# Patient Record
Sex: FEMALE | Race: BLACK OR AFRICAN AMERICAN | NOT HISPANIC OR LATINO | Employment: FULL TIME | URBAN - METROPOLITAN AREA
[De-identification: names, ages, dates, MRNs, and addresses within clinical notes are randomized per-mention and may not be internally consistent; named-entity substitution may affect disease eponyms.]

---

## 2017-07-31 ENCOUNTER — PATIENT MESSAGE (OUTPATIENT)
Dept: HEMATOLOGY/ONCOLOGY | Facility: CLINIC | Age: 62
End: 2017-07-31

## 2017-07-31 DIAGNOSIS — Z85.3 HISTORY OF BREAST CANCER: Primary | ICD-10-CM

## 2017-08-02 ENCOUNTER — OFFICE VISIT (OUTPATIENT)
Dept: HEMATOLOGY/ONCOLOGY | Facility: CLINIC | Age: 62
End: 2017-08-02
Payer: COMMERCIAL

## 2017-08-02 VITALS
RESPIRATION RATE: 20 BRPM | TEMPERATURE: 99 F | BODY MASS INDEX: 28.87 KG/M2 | SYSTOLIC BLOOD PRESSURE: 135 MMHG | HEART RATE: 81 BPM | OXYGEN SATURATION: 95 % | WEIGHT: 152.75 LBS | DIASTOLIC BLOOD PRESSURE: 79 MMHG

## 2017-08-02 DIAGNOSIS — Z85.3 HISTORY OF BREAST CANCER: Primary | ICD-10-CM

## 2017-08-02 PROCEDURE — 99999 PR PBB SHADOW E&M-EST. PATIENT-LVL III: CPT | Mod: PBBFAC,,, | Performed by: PHYSICIAN ASSISTANT

## 2017-08-02 PROCEDURE — 99213 OFFICE O/P EST LOW 20 MIN: CPT | Mod: S$GLB,,, | Performed by: PHYSICIAN ASSISTANT

## 2017-08-02 RX ORDER — NIFEDIPINE 60 MG/1
TABLET, EXTENDED RELEASE ORAL
Refills: 0 | COMMUNITY
Start: 2017-07-16

## 2017-08-02 NOTE — PROGRESS NOTES
Subjective:       Patient ID: Tosin Ma is a 61 y.o. female.    Chief Complaint: Follow-up    Mrs. Ma is a 59-year-old  female with a remote history of right breast cancer   treated in 1996 with lumpectomy, followed by radiation therapy. She did not receive chemotherapy or hormonal treatment. She was last seen in clinic by me in 8/2015.  Annual mammograms have been normal.     Mrs. Ma is feeling well. She does report a knot at her right breat but she is unsure if it is new or not and palpated it recently in the shower. No associated pain or tenderness.  She had similar complaint in 8/2015 at which point US was completed and no significant findings.  No recent changes in health.  No fever, chills, nausea, vomiting, headaches, visual changes or shortness of breath.     Mammogram scheduled for 9/25/17.          Review of Systems   Constitutional: Negative.    HENT: Negative for hearing loss, mouth sores, postnasal drip and sore throat.    Eyes: Negative for visual disturbance.   Respiratory: Negative for cough, chest tightness and shortness of breath.    Cardiovascular: Negative for chest pain and leg swelling.   Gastrointestinal: Negative for abdominal pain, blood in stool, constipation, diarrhea, nausea and vomiting.   Genitourinary: Negative for dysuria and hematuria.   Musculoskeletal: Negative for arthralgias, back pain, myalgias, neck pain and neck stiffness.   Skin: Negative for pallor and rash.   Neurological: Negative for dizziness, weakness and headaches.   Hematological: Negative for adenopathy. Does not bruise/bleed easily.   Psychiatric/Behavioral: Negative for decreased concentration. The patient is not nervous/anxious.        Objective:      Physical Exam   Constitutional: She is oriented to person, place, and time. She appears well-developed and well-nourished. No distress.   HENT:   Head: Normocephalic.   Mouth/Throat: Oropharynx is clear and moist. No oropharyngeal  exudate.   Eyes: Conjunctivae are normal. No scleral icterus.   Neck: Normal range of motion. Neck supple. No thyromegaly present.   Cardiovascular: Normal rate, regular rhythm, normal heart sounds and intact distal pulses.    Pulmonary/Chest: Effort normal and breath sounds normal. No respiratory distress.   Right breast with 1 cm mobile nodularity at 10 o'clock, right at areolar border.  This is in area of prior lumpectomy and has not changed from her last physical exam two years ago. Left breast without mass, nodule or skin changes. No axillary or supraclavicular adenopathy.    Abdominal: Soft. Bowel sounds are normal. She exhibits no distension and no mass. There is no tenderness.   Musculoskeletal:   No spinal or paraspinal tenderness to palpation     Lymphadenopathy:     She has no cervical adenopathy.   Neurological: She is alert and oriented to person, place, and time. No cranial nerve deficit.   Skin: Skin is warm and dry. No rash noted. No erythema. No pallor.   Psychiatric: She has a normal mood and affect. Her behavior is normal. Judgment and thought content normal.   Vitals reviewed.      Assessment:       61 year old female with history of right breast cancer (21 years out), clinically doing well.   Plan:       Given stability of nodule/scar tissue at right breast will plan to proceed with previously scheduled annual mammogram in September. At that time she will return to clinic.  I reassured her that finding was stable over the last two years both on physical exam and imaging.  Patient amenable to plan.

## 2017-08-02 NOTE — PROGRESS NOTES
Subjective:       Patient ID: Tosin Ma is a 61 y.o. female.    Chief Complaint: Follow-up    HPI  Review of Systems    Objective:      Physical Exam    Assessment:       No diagnosis found.    Plan:       ***

## 2017-09-25 ENCOUNTER — OFFICE VISIT (OUTPATIENT)
Dept: HEMATOLOGY/ONCOLOGY | Facility: CLINIC | Age: 62
End: 2017-09-25
Payer: COMMERCIAL

## 2017-09-25 ENCOUNTER — HOSPITAL ENCOUNTER (OUTPATIENT)
Dept: RADIOLOGY | Facility: HOSPITAL | Age: 62
Discharge: HOME | End: 2017-09-25
Attending: INTERNAL MEDICINE
Payer: COMMERCIAL

## 2017-09-25 VITALS
SYSTOLIC BLOOD PRESSURE: 135 MMHG | OXYGEN SATURATION: 100 % | WEIGHT: 147.69 LBS | BODY MASS INDEX: 27.91 KG/M2 | DIASTOLIC BLOOD PRESSURE: 80 MMHG | RESPIRATION RATE: 18 BRPM | HEART RATE: 95 BPM | TEMPERATURE: 98 F

## 2017-09-25 DIAGNOSIS — Z85.3 HISTORY OF BREAST CANCER: ICD-10-CM

## 2017-09-25 DIAGNOSIS — Z85.3 HISTORY OF BREAST CANCER: Primary | ICD-10-CM

## 2017-09-25 DIAGNOSIS — R92.8 ABNORMAL MAMMOGRAM: ICD-10-CM

## 2017-09-25 PROCEDURE — 77062 BREAST TOMOSYNTHESIS BI: CPT | Mod: 26,,, | Performed by: RADIOLOGY

## 2017-09-25 PROCEDURE — 3008F BODY MASS INDEX DOCD: CPT | Mod: S$GLB,,, | Performed by: PHYSICIAN ASSISTANT

## 2017-09-25 PROCEDURE — 77066 DX MAMMO INCL CAD BI: CPT | Mod: 26,,, | Performed by: RADIOLOGY

## 2017-09-25 PROCEDURE — 77062 BREAST TOMOSYNTHESIS BI: CPT | Mod: TC

## 2017-09-25 PROCEDURE — 99999 PR PBB SHADOW E&M-EST. PATIENT-LVL III: CPT | Mod: PBBFAC,,, | Performed by: PHYSICIAN ASSISTANT

## 2017-09-25 PROCEDURE — 99213 OFFICE O/P EST LOW 20 MIN: CPT | Mod: S$GLB,,, | Performed by: PHYSICIAN ASSISTANT

## 2017-09-25 NOTE — PROGRESS NOTES
Subjective:       Patient ID: Tosin Ma is a 61 y.o. female.    Chief Complaint: No chief complaint on file.    Mrs. Ma is a 59-year-old  female with a remote history of right breast cancer   treated in 1996 with lumpectomy, followed by radiation therapy. She did not receive chemotherapy or hormonal treatment. She was last seen in clinic by me in 8/2015.  Annual mammograms have been normal.     Mrs. Ma is feeling well. No recent changes in health, feeling well.  She did have abnormal left mammogram today as outlined below.   No fever, chills, nausea, vomiting, headaches, visual changes or shortness of breath.     Bilateral mammogram from 9/25/17:      Impression:  Left  Calcifications: Left breast calcifications at the middle 3 o'clock position. Assessment: 4 - Suspicious finding. Biopsy is recommended.      Right  There is no mammographic evidence of malignancy.     BI-RADS Category:   Overall: 4 - Suspicious     Recommendation:  Biopsy is recommended for the left breast. I discussed the findings with the patient. She will be scheduled for her biopsy.      Review of Systems   Constitutional: Negative.    HENT: Negative for hearing loss, mouth sores, postnasal drip and sore throat.    Eyes: Negative for visual disturbance.   Respiratory: Negative for cough, chest tightness and shortness of breath.    Cardiovascular: Negative for chest pain and leg swelling.   Gastrointestinal: Negative for abdominal pain, blood in stool, constipation, diarrhea, nausea and vomiting.   Genitourinary: Negative for dysuria and hematuria.   Musculoskeletal: Negative for arthralgias, back pain, myalgias, neck pain and neck stiffness.   Skin: Negative for pallor and rash.   Neurological: Negative for dizziness, weakness and headaches.   Hematological: Negative for adenopathy. Does not bruise/bleed easily.   Psychiatric/Behavioral: Negative for decreased concentration. The patient is not nervous/anxious.         Objective:      Physical Exam   Constitutional: She is oriented to person, place, and time. She appears well-developed and well-nourished. No distress.   HENT:   Head: Normocephalic.   Mouth/Throat: Oropharynx is clear and moist. No oropharyngeal exudate.   Eyes: Conjunctivae are normal. No scleral icterus.   Neck: Normal range of motion. Neck supple. No thyromegaly present.   Cardiovascular: Normal rate, regular rhythm, normal heart sounds and intact distal pulses.    Pulmonary/Chest: Effort normal and breath sounds normal. No respiratory distress.   Right breast with 1 cm mobile nodularity at 10 o'clock, right at areolar border.  This is in area of prior lumpectomy and has not changed from her last physical exam two years ago. Left breast without mass, nodule or skin changes. No axillary or supraclavicular adenopathy.    Abdominal: Soft. Bowel sounds are normal. She exhibits no distension and no mass. There is no tenderness.   Musculoskeletal:   No spinal or paraspinal tenderness to palpation     Lymphadenopathy:     She has no cervical adenopathy.   Neurological: She is alert and oriented to person, place, and time. No cranial nerve deficit.   Skin: Skin is warm and dry. No rash noted. No erythema. No pallor.   Psychiatric: She has a normal mood and affect. Her behavior is normal. Judgment and thought content normal.   Vitals reviewed.      Assessment:       61 year old female with history of right breast cancer (21 years out) and abnormal mammogram.   Plan:       Patient to proceed with biopsy as scheduled.  Return to clinic in one month, sooner if warranted by biopsy results.

## 2017-09-26 ENCOUNTER — TELEPHONE (OUTPATIENT)
Dept: RADIOLOGY | Facility: HOSPITAL | Age: 62
End: 2017-09-26

## 2017-09-26 NOTE — TELEPHONE ENCOUNTER
Spoke with patient. Reviewed breast biopsy procedure and reviewed instructions for breast biopsy. Patient expressed understanding and all questions were answered. Provided patient with my phone number to call for any further concerns or questions.   Patient scheduled breast biopsy at the [facility] on 10/6/17

## 2017-10-06 ENCOUNTER — HOSPITAL ENCOUNTER (OUTPATIENT)
Dept: RADIOLOGY | Facility: HOSPITAL | Age: 62
Discharge: HOME | End: 2017-10-06
Attending: INTERNAL MEDICINE
Payer: COMMERCIAL

## 2017-10-06 DIAGNOSIS — R92.8 ABNORMAL MAMMOGRAM: Primary | ICD-10-CM

## 2017-10-06 PROCEDURE — 77065 DX MAMMO INCL CAD UNI: CPT | Mod: TC,LT

## 2017-10-06 PROCEDURE — A4648 IMPLANTABLE TISSUE MARKER: HCPCS

## 2017-10-06 PROCEDURE — 19081 BX BREAST 1ST LESION STRTCTC: CPT | Mod: LT,,, | Performed by: STUDENT IN AN ORGANIZED HEALTH CARE EDUCATION/TRAINING PROGRAM

## 2017-10-06 PROCEDURE — 88305 TISSUE EXAM BY PATHOLOGIST: CPT | Performed by: PATHOLOGY

## 2017-10-06 PROCEDURE — 88305 TISSUE EXAM BY PATHOLOGIST: CPT | Mod: 26,,, | Performed by: PATHOLOGY

## 2017-10-06 PROCEDURE — 77065 DX MAMMO INCL CAD UNI: CPT | Mod: 26,LT,, | Performed by: STUDENT IN AN ORGANIZED HEALTH CARE EDUCATION/TRAINING PROGRAM

## 2017-10-09 ENCOUNTER — TELEPHONE (OUTPATIENT)
Dept: SURGERY | Facility: CLINIC | Age: 62
End: 2017-10-09

## 2017-10-09 NOTE — TELEPHONE ENCOUNTER
Called patient with the results of her breast biopsy from Friday. Explained that it showed fibroadenomatoid changes,  no atypia/benign, all questions answered, pt advised to follow up in 6 months with repeat imaging per core biopsy protocol, advised case will be reviewed in the weekly core conference and pt will be notified if there are any changes. Patient verbalized understanding of all information

## 2017-10-26 ENCOUNTER — OFFICE VISIT (OUTPATIENT)
Dept: HEMATOLOGY/ONCOLOGY | Facility: CLINIC | Age: 62
End: 2017-10-26
Payer: COMMERCIAL

## 2017-10-26 ENCOUNTER — TELEPHONE (OUTPATIENT)
Dept: HEMATOLOGY/ONCOLOGY | Facility: CLINIC | Age: 62
End: 2017-10-26

## 2017-10-26 VITALS
OXYGEN SATURATION: 94 % | WEIGHT: 150.13 LBS | RESPIRATION RATE: 15 BRPM | DIASTOLIC BLOOD PRESSURE: 75 MMHG | TEMPERATURE: 99 F | HEIGHT: 61 IN | BODY MASS INDEX: 28.35 KG/M2 | SYSTOLIC BLOOD PRESSURE: 154 MMHG | HEART RATE: 82 BPM

## 2017-10-26 DIAGNOSIS — R92.8 ABNORMAL MAMMOGRAM: ICD-10-CM

## 2017-10-26 DIAGNOSIS — Z85.3 HISTORY OF BREAST CANCER: Primary | ICD-10-CM

## 2017-10-26 PROCEDURE — 99999 PR PBB SHADOW E&M-EST. PATIENT-LVL III: CPT | Mod: PBBFAC,,, | Performed by: PHYSICIAN ASSISTANT

## 2017-10-26 PROCEDURE — 99213 OFFICE O/P EST LOW 20 MIN: CPT | Mod: S$GLB,,, | Performed by: PHYSICIAN ASSISTANT

## 2017-10-26 NOTE — TELEPHONE ENCOUNTER
Called pt to confirm her 4 o'clock appt with Sisi Duron. Pt did not answer so I left her a voicemail.

## 2017-10-26 NOTE — PROGRESS NOTES
Subjective:       Patient ID: Tosin Ma is a 61 y.o. female.    Chief Complaint: Breast Cancer    Mrs. Ma is a 61-year-old  female with a remote history of right breast cancer   treated in 1996 with lumpectomy, followed by radiation therapy. She did not receive chemotherapy or hormonal treatment.       She underwent biopsy of left breast abnormality on 10/6/17, that pathology was benign.  She is feeling well and no post biopsy issues.   Mrs. Ma is feeling well. No recent changes in health, feeling well.           Review of Systems   Constitutional: Negative.    HENT: Negative for hearing loss, mouth sores, postnasal drip and sore throat.    Eyes: Negative for visual disturbance.   Respiratory: Negative for cough, chest tightness and shortness of breath.    Cardiovascular: Negative for chest pain and leg swelling.   Gastrointestinal: Negative for abdominal pain, blood in stool, constipation, diarrhea, nausea and vomiting.   Genitourinary: Negative for dysuria and hematuria.   Musculoskeletal: Negative for arthralgias, back pain, myalgias, neck pain and neck stiffness.   Skin: Negative for pallor and rash.   Neurological: Negative for dizziness, weakness and headaches.   Hematological: Negative for adenopathy. Does not bruise/bleed easily.   Psychiatric/Behavioral: Negative for decreased concentration. The patient is not nervous/anxious.        Objective:      Physical Exam   Constitutional: She is oriented to person, place, and time. She appears well-developed and well-nourished. No distress.   HENT:   Head: Normocephalic.   Mouth/Throat: Oropharynx is clear and moist. No oropharyngeal exudate.   Eyes: Conjunctivae are normal. No scleral icterus.   Neck: Normal range of motion. Neck supple. No thyromegaly present.   Cardiovascular: Normal rate, regular rhythm, normal heart sounds and intact distal pulses.    Pulmonary/Chest: Effort normal and breath sounds normal. No respiratory  distress.   Right breast with 1 cm mobile nodularity at 10 o'clock, right at areolar border.  This is in area of prior lumpectomy and has not changed from her last physical exam two years ago. Left breast without mass, nodule or skin changes. No axillary or supraclavicular adenopathy.    Abdominal: Soft. Bowel sounds are normal. She exhibits no distension and no mass. There is no tenderness.   Musculoskeletal:   No spinal or paraspinal tenderness to palpation     Lymphadenopathy:     She has no cervical adenopathy.   Neurological: She is alert and oriented to person, place, and time. No cranial nerve deficit.   Skin: Skin is warm and dry. No rash noted. No erythema. No pallor.   Psychiatric: She has a normal mood and affect. Her behavior is normal. Judgment and thought content normal.   Vitals reviewed.      Assessment:       1. History of breast cancer        Plan:       Return to clinic in 6 months with mammogram.

## 2018-04-25 ENCOUNTER — OFFICE VISIT (OUTPATIENT)
Dept: HEMATOLOGY/ONCOLOGY | Facility: CLINIC | Age: 63
End: 2018-04-25
Payer: COMMERCIAL

## 2018-04-25 ENCOUNTER — HOSPITAL ENCOUNTER (OUTPATIENT)
Dept: RADIOLOGY | Facility: HOSPITAL | Age: 63
Discharge: HOME | End: 2018-04-25
Attending: PHYSICIAN ASSISTANT
Payer: COMMERCIAL

## 2018-04-25 VITALS — BODY MASS INDEX: 28.32 KG/M2 | WEIGHT: 150 LBS | HEIGHT: 61 IN

## 2018-04-25 VITALS
WEIGHT: 151.44 LBS | TEMPERATURE: 98 F | RESPIRATION RATE: 16 BRPM | OXYGEN SATURATION: 97 % | DIASTOLIC BLOOD PRESSURE: 80 MMHG | HEIGHT: 61 IN | BODY MASS INDEX: 28.59 KG/M2 | HEART RATE: 71 BPM | SYSTOLIC BLOOD PRESSURE: 169 MMHG

## 2018-04-25 DIAGNOSIS — R92.8 ABNORMAL MAMMOGRAM: ICD-10-CM

## 2018-04-25 DIAGNOSIS — Z85.3 HISTORY OF BREAST CANCER: Primary | ICD-10-CM

## 2018-04-25 PROCEDURE — 77065 DX MAMMO INCL CAD UNI: CPT | Mod: TC,PO,LT

## 2018-04-25 PROCEDURE — 77061 BREAST TOMOSYNTHESIS UNI: CPT | Mod: 26,LT,, | Performed by: RADIOLOGY

## 2018-04-25 PROCEDURE — 99214 OFFICE O/P EST MOD 30 MIN: CPT | Mod: S$GLB,,, | Performed by: PHYSICIAN ASSISTANT

## 2018-04-25 PROCEDURE — 99999 PR PBB SHADOW E&M-EST. PATIENT-LVL III: CPT | Mod: PBBFAC,,, | Performed by: PHYSICIAN ASSISTANT

## 2018-04-25 PROCEDURE — 77061 BREAST TOMOSYNTHESIS UNI: CPT | Mod: TC,PO,LT

## 2018-04-25 PROCEDURE — 77065 DX MAMMO INCL CAD UNI: CPT | Mod: 26,LT,, | Performed by: RADIOLOGY

## 2018-04-25 RX ORDER — GLIPIZIDE 5 MG/1
5 TABLET, FILM COATED, EXTENDED RELEASE ORAL
COMMUNITY
Start: 2018-04-19

## 2018-04-25 NOTE — PROGRESS NOTES
Subjective:       Patient ID: Tosin Ma is a 62 y.o. female.    Chief Complaint: Follow-up    Mrs. Ma is a 62-year-old  female with a remote history of right breast cancer   treated in 1996 with lumpectomy, followed by radiation therapy. She did not receive chemotherapy or hormonal treatment.       She underwent biopsy of left breast abnormality on 10/6/17, that pathology was benign.  She is feeling well and no post biopsy issues.   Mrs. Ma is feeling well. No recent changes in health, feeling well.      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    Mammogram from today:   Impression:  There is no mammographic evidence of malignancy.  BI-RADS Category:   Left: 2 - Benign  Overall: 2 - Benign  Recommendation:   Return to annual screening mammogram schedule is recommended due in September, 2018      Review of Systems   Constitutional: Negative.    HENT: Negative for hearing loss, mouth sores, postnasal drip and sore throat.    Eyes: Negative for visual disturbance.   Respiratory: Negative for cough, chest tightness and shortness of breath.    Cardiovascular: Negative for chest pain and leg swelling.   Gastrointestinal: Negative for abdominal pain, blood in stool, constipation, diarrhea, nausea and vomiting.   Genitourinary: Negative for dysuria and hematuria.   Musculoskeletal: Negative for arthralgias, back pain, myalgias, neck pain and neck stiffness.   Skin: Negative for pallor and rash.   Neurological: Negative for dizziness, weakness and headaches.   Hematological: Negative for adenopathy. Does not bruise/bleed easily.   Psychiatric/Behavioral: Negative for decreased concentration. The patient is not nervous/anxious.        Objective:      Physical Exam   Constitutional: She is oriented to person, place, and time. She appears  well-developed and well-nourished. No distress.   Presents alone     HENT:   Head: Normocephalic.   Mouth/Throat: Oropharynx is clear and moist. No oropharyngeal exudate.   Eyes: Conjunctivae are normal. No scleral icterus.   Neck: Normal range of motion. Neck supple. No thyromegaly present.   Cardiovascular: Normal rate, regular rhythm, normal heart sounds and intact distal pulses.    Pulmonary/Chest: Effort normal and breath sounds normal. No respiratory distress.   Right breast with 1 cm mobile nodularity at 10 o'clock, right at areolar border.  This is in area of prior lumpectomy and has not changed from her last physical exam two years ago. Left breast without mass, nodule or skin changes. No axillary or supraclavicular adenopathy.    Abdominal: Soft. Bowel sounds are normal. She exhibits no distension and no mass. There is no tenderness.   Musculoskeletal:   No spinal or paraspinal tenderness to palpation     Lymphadenopathy:     She has no cervical adenopathy.   Neurological: She is alert and oriented to person, place, and time. No cranial nerve deficit.   Skin: Skin is warm and dry. No rash noted. No erythema. No pallor.   Psychiatric: She has a normal mood and affect. Her behavior is normal. Judgment and thought content normal.   Vitals reviewed.      Assessment:       1. History of breast cancer        Plan:       Return to clinic in one year with mammogram.     Distress Screening Results: Psychosocial Distress screening score of Distress Score: 0 noted and reviewed. No intervention indicated.

## 2019-02-01 ENCOUNTER — PATIENT MESSAGE (OUTPATIENT)
Dept: HEMATOLOGY/ONCOLOGY | Facility: CLINIC | Age: 64
End: 2019-02-01

## 2019-03-15 ENCOUNTER — PATIENT MESSAGE (OUTPATIENT)
Dept: HEMATOLOGY/ONCOLOGY | Facility: CLINIC | Age: 64
End: 2019-03-15

## 2019-03-25 ENCOUNTER — PATIENT MESSAGE (OUTPATIENT)
Dept: HEMATOLOGY/ONCOLOGY | Facility: CLINIC | Age: 64
End: 2019-03-25

## 2019-03-25 DIAGNOSIS — Z12.11 COLON CANCER SCREENING: Primary | ICD-10-CM

## 2019-03-27 DIAGNOSIS — Z12.11 SPECIAL SCREENING FOR MALIGNANT NEOPLASMS, COLON: Primary | ICD-10-CM

## 2019-03-27 RX ORDER — POLYETHYLENE GLYCOL 3350, SODIUM SULFATE ANHYDROUS, SODIUM BICARBONATE, SODIUM CHLORIDE, POTASSIUM CHLORIDE 236; 22.74; 6.74; 5.86; 2.97 G/4L; G/4L; G/4L; G/4L; G/4L
4 POWDER, FOR SOLUTION ORAL ONCE
Qty: 4000 ML | Refills: 0 | Status: SHIPPED | OUTPATIENT
Start: 2019-03-27 | End: 2019-03-27

## 2019-05-30 ENCOUNTER — ANESTHESIA EVENT (OUTPATIENT)
Dept: ENDOSCOPY | Facility: HOSPITAL | Age: 64
End: 2019-05-30
Payer: COMMERCIAL

## 2019-05-31 ENCOUNTER — ANESTHESIA (OUTPATIENT)
Dept: ENDOSCOPY | Facility: HOSPITAL | Age: 64
End: 2019-05-31
Payer: COMMERCIAL

## 2019-05-31 ENCOUNTER — HOSPITAL ENCOUNTER (OUTPATIENT)
Facility: HOSPITAL | Age: 64
Discharge: HOME | End: 2019-05-31
Attending: SURGERY | Admitting: SURGERY
Payer: COMMERCIAL

## 2019-05-31 VITALS
OXYGEN SATURATION: 100 % | SYSTOLIC BLOOD PRESSURE: 147 MMHG | HEART RATE: 87 BPM | DIASTOLIC BLOOD PRESSURE: 80 MMHG | RESPIRATION RATE: 14 BRPM | TEMPERATURE: 97 F | HEIGHT: 61 IN | BODY MASS INDEX: 27.75 KG/M2 | WEIGHT: 147 LBS

## 2019-05-31 DIAGNOSIS — Z12.11 ENCOUNTER FOR SCREENING COLONOSCOPY: Primary | ICD-10-CM

## 2019-05-31 LAB — POCT GLUCOSE: 107 MG/DL (ref 70–110)

## 2019-05-31 PROCEDURE — G0121 COLON CA SCRN NOT HI RSK IND: HCPCS | Mod: ,,, | Performed by: SURGERY

## 2019-05-31 PROCEDURE — E9220 PRA ENDO ANESTHESIA: ICD-10-PCS | Mod: ,,, | Performed by: NURSE ANESTHETIST, CERTIFIED REGISTERED

## 2019-05-31 PROCEDURE — 37000008 HC ANESTHESIA 1ST 15 MINUTES: Performed by: SURGERY

## 2019-05-31 PROCEDURE — 63600175 PHARM REV CODE 636 W HCPCS: Performed by: NURSE ANESTHETIST, CERTIFIED REGISTERED

## 2019-05-31 PROCEDURE — 25000003 PHARM REV CODE 250: Performed by: SURGERY

## 2019-05-31 PROCEDURE — 37000009 HC ANESTHESIA EA ADD 15 MINS: Performed by: SURGERY

## 2019-05-31 PROCEDURE — 82962 GLUCOSE BLOOD TEST: CPT | Performed by: SURGERY

## 2019-05-31 PROCEDURE — E9220 PRA ENDO ANESTHESIA: HCPCS | Mod: ,,, | Performed by: NURSE ANESTHETIST, CERTIFIED REGISTERED

## 2019-05-31 PROCEDURE — 25000003 PHARM REV CODE 250: Performed by: NURSE ANESTHETIST, CERTIFIED REGISTERED

## 2019-05-31 PROCEDURE — G0121 COLON CA SCRN NOT HI RSK IND: ICD-10-PCS | Mod: ,,, | Performed by: SURGERY

## 2019-05-31 PROCEDURE — G0121 COLON CA SCRN NOT HI RSK IND: HCPCS | Performed by: SURGERY

## 2019-05-31 RX ORDER — PROPOFOL 10 MG/ML
VIAL (ML) INTRAVENOUS CONTINUOUS PRN
Status: DISCONTINUED | OUTPATIENT
Start: 2019-05-31 | End: 2019-05-31

## 2019-05-31 RX ORDER — PROPOFOL 10 MG/ML
VIAL (ML) INTRAVENOUS
Status: DISCONTINUED | OUTPATIENT
Start: 2019-05-31 | End: 2019-05-31

## 2019-05-31 RX ORDER — GLYCOPYRROLATE 0.2 MG/ML
INJECTION INTRAMUSCULAR; INTRAVENOUS
Status: DISCONTINUED | OUTPATIENT
Start: 2019-05-31 | End: 2019-05-31

## 2019-05-31 RX ORDER — SODIUM CHLORIDE 9 MG/ML
INJECTION, SOLUTION INTRAVENOUS CONTINUOUS
Status: DISCONTINUED | OUTPATIENT
Start: 2019-05-31 | End: 2019-05-31 | Stop reason: HOSPADM

## 2019-05-31 RX ORDER — LIDOCAINE HCL/PF 100 MG/5ML
SYRINGE (ML) INTRAVENOUS
Status: DISCONTINUED | OUTPATIENT
Start: 2019-05-31 | End: 2019-05-31

## 2019-05-31 RX ADMIN — PROPOFOL 20 MG: 10 INJECTION, EMULSION INTRAVENOUS at 07:05

## 2019-05-31 RX ADMIN — SODIUM CHLORIDE: 0.9 INJECTION, SOLUTION INTRAVENOUS at 07:05

## 2019-05-31 RX ADMIN — LIDOCAINE HYDROCHLORIDE 50 MG: 20 INJECTION, SOLUTION INTRAVENOUS at 07:05

## 2019-05-31 RX ADMIN — PROPOFOL 50 MG: 10 INJECTION, EMULSION INTRAVENOUS at 07:05

## 2019-05-31 RX ADMIN — PROPOFOL 150 MCG/KG/MIN: 10 INJECTION, EMULSION INTRAVENOUS at 07:05

## 2019-05-31 RX ADMIN — GLYCOPYRROLATE 0.2 MG: 0.2 INJECTION, SOLUTION INTRAMUSCULAR; INTRAVENOUS at 07:05

## 2019-05-31 NOTE — ANESTHESIA PREPROCEDURE EVALUATION
05/31/2019  Tosin Ma is a 63 y.o., female.    Anesthesia Evaluation    I have reviewed the Patient Summary Reports.     I have reviewed the Medications.     Review of Systems  Anesthesia Hx:  No problems with previous Anesthesia    Social:  Non-Smoker, No Alcohol Use    Hematology/Oncology:         -- Cancer in past history: Breast right   Cardiovascular:   Hypertension    Pulmonary:  Pulmonary Normal    Endocrine:   Diabetes        Physical Exam  General:  Well nourished    Airway/Jaw/Neck:  Airway Findings: Mouth Opening: Normal Tongue: Normal  General Airway Assessment: Adult  Mallampati: II  Jaw/Neck Findings:  Neck ROM: Normal ROM      Dental:  Dental Findings: In tact    Chest/Lungs:  Chest/Lungs Findings: Clear to auscultation, Normal Respiratory Rate     Heart/Vascular:  Heart Findings: Rate: Normal  Rhythm: Regular Rhythm  Sounds: Normal        Mental Status:  Mental Status Findings:  Cooperative, Alert and Oriented         Anesthesia Plan  Type of Anesthesia, risks & benefits discussed:  Anesthesia Type:  general  Patient's Preference:   Intra-op Monitoring Plan: standard ASA monitors  Intra-op Monitoring Plan Comments:   Post Op Pain Control Plan: multimodal analgesia  Post Op Pain Control Plan Comments:   Induction:   IV  Beta Blocker:         Informed Consent: Patient understands risks and agrees with Anesthesia plan.  Questions answered. Anesthesia consent signed with patient.  ASA Score: 3     Day of Surgery Review of History & Physical:    H&P update referred to the surgeon.         Ready For Surgery From Anesthesia Perspective.

## 2019-05-31 NOTE — H&P
[facility]  History & Physical     Subjective:     Chief Complaint/Reason for Admission: screening colonoscopy    History of Present Illness:   Patient 63 y.o. female presents for screening colonoscopy.  She notes that it has been over 10 years since her last colonoscopy.  Pt denies any abdominal pain.  She does have occasional light bleeding with BM.  No changes in bowel habits. Pt suffers with DM and HTN.  PSHx isnotable for csection for hysterectomy    Patient Active Problem List    Diagnosis Date Noted    Encounter for screening colonoscopy 05/31/2019    History of breast cancer 08/04/2014        Medications Prior to Admission   Medication Sig Dispense Refill Last Dose    glipiZIDE (GLUCOTROL) 5 MG TR24 Take 5 mg by mouth.   Past Week at Unknown time    hydrochlorothiazide (HYDRODIURIL) 25 MG tablet   0 5/31/2019 at Unknown time    LANTUS SOLOSTAR 100 unit/mL (3 mL) InPn pen   0 Past Week at Unknown time    metformin (GLUCOPHAGE) 500 MG tablet   0 Past Week at Unknown time    nifedipine (PROCARDIA-XL) 60 MG (OSM) 24 hr tablet   0 5/31/2019 at Unknown time    pioglitazone (ACTOS) 30 MG tablet Take 30 mg by mouth once daily.  0 More than a month at Unknown time     Review of patient's allergies indicates:  No Known Allergies     Past Medical History:   Diagnosis Date    Breast cancer 1996    Right    Diabetes mellitus     Hypertension       Past Surgical History:   Procedure Laterality Date    BREAST BIOPSY Left 10/06/2017    BREAST BIOPSY Right 1996    BREAST LUMPECTOMY Right 1996    + CA      Family History   Problem Relation Age of Onset    Breast cancer Sister 48    Breast cancer Cousin         30's    Breast cancer Cousin 48    Pancreatic cancer Mother     Ovarian cancer Neg Hx       Social History     Tobacco Use    Smoking status: Never Smoker    Smokeless tobacco: Never Used   Substance Use Topics    Alcohol use: No        Review of Systems:  A comprehensive review  Updated Dr Tejada on pt's low urine output. Order received to give 500ml fluid bolus.   "of systems was negative.    OBJECTIVE:     Patient Vitals for the past 8 hrs:   BP Temp Temp src Pulse Resp SpO2 Height Weight   05/31/19 0719 136/81 97.7 °F (36.5 °C) Temporal 86 16 98 % 5' 1" (1.549 m) 66.7 kg (147 lb)     AAOx3  CTA B  Sinus  Soft/nt/nd        ASSESSMENT/PLAN:     Active Hospital Problems    Diagnosis  POA    Encounter for screening colonoscopy [Z12.11]  Not Applicable      Resolved Hospital Problems   No resolved problems to display.       Plan:  To have colonoscopy today  "

## 2019-05-31 NOTE — ANESTHESIA POSTPROCEDURE EVALUATION
Anesthesia Post Evaluation    Patient: Tosin Ma    Procedure(s) Performed: Procedure(s) (LRB):  COLONOSCOPY (N/A)    Final Anesthesia Type: general  Patient location during evaluation: GI PACU  Patient participation: Yes- Able to Participate  Level of consciousness: awake and alert  Post-procedure vital signs: reviewed and stable  Pain management: adequate  Airway patency: patent  PONV status at discharge: No PONV  Anesthetic complications: no      Cardiovascular status: blood pressure returned to baseline  Respiratory status: spontaneous ventilation  Hydration status: euvolemic  Follow-up not needed.          Vitals Value Taken Time   /80 5/31/2019  8:32 AM   Temp 36.3 °C (97.4 °F) 5/31/2019  8:02 AM   Pulse 87 5/31/2019  8:32 AM   Resp 14 5/31/2019  8:32 AM   SpO2 100 % 5/31/2019  8:32 AM         Event Time     Out of Recovery 08:36:56          Pain/Nader Score: Nader Score: 10 (5/31/2019  8:32 AM)

## 2019-05-31 NOTE — [employer] PATIENT INSTRUCTIONS
Discharge Summary/Instructions after an Endoscopic Procedure  Patient Name: Tosin Ma  Patient MRN: [id number]  Patient YOB: 1955  Friday, May 31, 2019  Fredrick Bailey MD  RESTRICTIONS:  During your procedure today, you received medications for sedation.  These   medications may affect your judgment, balance and coordination.  Therefore,   for 24 hours, you have the following restrictions:   - DO NOT drive a car, operate machinery, make legal/financial decisions,   sign important papers or drink alcohol.    ACTIVITY:  Today: no heavy lifting, straining or running due to procedural   sedation/anesthesia.  The following day: return to full activity including work.  DIET:  Eat and drink normally unless instructed otherwise.     TREATMENT FOR COMMON SIDE EFFECTS:  - Mild abdominal pain, nausea, belching, bloating or excessive gas:  rest,   eat lightly and use a heating pad.  - Sore Throat: treat with throat lozenges and/or gargle with warm salt   water.  - Because air was used during the procedure, expelling large amounts of air   from your rectum or belching is normal.  - If a bowel prep was taken, you may not have a bowel movement for 1-3 days.    This is normal.  SYMPTOMS TO WATCH FOR AND REPORT TO YOUR PHYSICIAN:  1. Abdominal pain or bloating, other than gas cramps.  2. Chest pain.  3. Back pain.  4. Signs of infection such as: chills or fever occurring within 24 hours   after the procedure.  5. Rectal bleeding, which would show as bright red, maroon, or black stools.   (A tablespoon of blood from the rectum is not serious, especially if   hemorrhoids are present.)  6. Vomiting.  7. Weakness or dizziness.  GO DIRECTLY TO THE NEAREST EMERGENCY ROOM IF YOU HAVE ANY OF THE FOLLOWING:      Difficulty breathing              Chills and/or fever over 101 F   Persistent vomiting and/or vomiting blood   Severe abdominal pain   Severe chest pain   Black, tarry stools   Bleeding- more than one  tablespoon   Any other symptom or condition that you feel may need urgent attention  Your doctor recommends these additional instructions:  If any biopsies were taken, your doctors clinic will contact you in 1 to 2   weeks with any results.  - Discharge patient to home (ambulatory).   - Resume regular diet.   - Continue present medications.   - Repeat colonoscopy in 10 years for screening purposes.   - Return to my office PRN.   - Patient has a contact number available for emergencies.  The signs and   symptoms of potential delayed complications were discussed with the   patient.  Return to normal activities tomorrow.  Written discharge   instructions were provided to the patient.  For questions, problems or results please call your physician - Fredrick Bailey MD at Work:  [phone].  [facility], EMERGENCY ROOM PHONE NUMBER: [phone]  IF A COMPLICATION OR EMERGENCY SITUATION ARISES AND YOU ARE UNABLE TO REACH   YOUR PHYSICIAN - GO DIRECTLY TO THE EMERGENCY ROOM.  Fredrick Bailey MD  5/31/2019 8:01:32 AM  This report has been verified and signed electronically.  [employer]

## 2019-05-31 NOTE — DISCHARGE INSTRUCTIONS
Colonoscopy     A camera attached to a flexible tube with a viewing lens is used to take video pictures.     Colonoscopy is a test to view the inside of your lower digestive tract (colon and rectum). Sometimes it can show the last part of the small intestine (ileum). During the test, small pieces of tissue may be removed for testing. This is called a biopsy. Small growths, such as polyps, may also be removed.   Why is colonoscopy done?  The test is done to help look for colon cancer. And it can help find the source of abdominal pain, bleeding, and changes in bowel habits. It may be needed once a year, depending on factors such as your:  · Age  · Health history  · Family health history  · Symptoms  · Results from any prior colonoscopy  Risks and possible complications  These include:  · Bleeding               · A puncture or tear in the colon   · Risks of anesthesia  · A cancer lesion not being seen  Getting ready   To prepare for the test:  · Talk with your healthcare provider about the risks of the test (see below). Also ask your healthcare provider about alternatives to the test.  · Tell your healthcare provider about any medicines you take. Also tell him or her about any health conditions you may have.  · Make sure your rectum and colon are empty for the test. Follow the diet and bowel prep instructions exactly. If you dont, the test may need to be rescheduled.  · Plan for a friend or family member to drive you home after the test.     Colonoscopy provides an inside view of the entire colon.     You may discuss the results with your doctor right away or at a future visit.  During the test   The test is usually done in the hospital on an outpatient basis. This means you go home the same day. The procedure takes about 30 minutes. During that time:  · You are given relaxing (sedating) medicine through an IV line. You may be drowsy, or fully asleep.  · The healthcare provider will first give you a physical exam to  check for anal and rectal problems.  · Then the anus is lubricated and the scope inserted.  · If you are awake, you may have a feeling similar to needing to have a bowel movement. You may also feel pressure as air is pumped into the colon. Its OK to pass gas during the procedure.  · Biopsy, polyp removal, or other treatments may be done during the test.  After the test   You may have gas right after the test. It can help to try to pass it to help prevent later bloating. Your healthcare provider may discuss the results with you right away. Or you may need to schedule a follow-up visit to talk about the results. After the test, you can go back to your normal eating and other activities. You may be tired from the sedation and need to rest for a few hours.  Date Last Reviewed: 11/1/2016 © 2000-2017 [employer]. [address] [place] [address], [place] [address]. All rights reserved. This information is not intended as a substitute for professional medical care. Always follow your healthcare professional's instructions.

## 2019-05-31 NOTE — TRANSFER OF CARE
"Anesthesia Transfer of Care Note    Patient: Tosin Ma    Procedure(s) Performed: Procedure(s) (LRB):  COLONOSCOPY (N/A)    Patient location: GI    Anesthesia Type: general    Transport from OR: Transported from OR on room air with adequate spontaneous ventilation    Post pain: adequate analgesia    Post assessment: no apparent anesthetic complications and tolerated procedure well    Post vital signs: stable    Level of consciousness: awake    Nausea/Vomiting: no nausea/vomiting    Complications: none    Transfer of care protocol was followed      Last vitals:   Visit Vitals  /60 (BP Location: Left leg, Patient Position: Lying)   Pulse 92   Temp 36.3 °C (97.4 °F) (Temporal)   Resp 14   Ht 5' 1" (1.549 m)   Wt 66.7 kg (147 lb)   SpO2 100%   Breastfeeding? No   BMI 27.78 kg/m²     "

## 2019-06-07 ENCOUNTER — TELEPHONE (OUTPATIENT)
Dept: ENDOSCOPY | Facility: HOSPITAL | Age: 64
End: 2019-06-07

## 2020-10-22 DIAGNOSIS — Z85.3 HISTORY OF BREAST CANCER: Primary | ICD-10-CM

## 2020-11-12 ENCOUNTER — HOSPITAL ENCOUNTER (OUTPATIENT)
Dept: RADIOLOGY | Facility: HOSPITAL | Age: 65
Discharge: HOME | End: 2020-11-12
Attending: PHYSICIAN ASSISTANT
Payer: COMMERCIAL

## 2020-11-12 ENCOUNTER — OFFICE VISIT (OUTPATIENT)
Dept: HEMATOLOGY/ONCOLOGY | Facility: CLINIC | Age: 65
End: 2020-11-12
Payer: COMMERCIAL

## 2020-11-12 VITALS — BODY MASS INDEX: 27.26 KG/M2 | WEIGHT: 144.38 LBS | HEIGHT: 61 IN

## 2020-11-12 VITALS
WEIGHT: 139.56 LBS | HEIGHT: 61 IN | TEMPERATURE: 98 F | DIASTOLIC BLOOD PRESSURE: 76 MMHG | HEART RATE: 74 BPM | OXYGEN SATURATION: 100 % | BODY MASS INDEX: 26.35 KG/M2 | SYSTOLIC BLOOD PRESSURE: 126 MMHG

## 2020-11-12 DIAGNOSIS — Z85.3 HISTORY OF BREAST CANCER: ICD-10-CM

## 2020-11-12 DIAGNOSIS — Z85.3 HISTORY OF BREAST CANCER: Primary | ICD-10-CM

## 2020-11-12 PROCEDURE — 77066 DX MAMMO INCL CAD BI: CPT | Mod: 26,,, | Performed by: RADIOLOGY

## 2020-11-12 PROCEDURE — 77062 MAMMO DIGITAL DIAGNOSTIC BILAT WITH TOMO: ICD-10-PCS | Mod: 26,,, | Performed by: RADIOLOGY

## 2020-11-12 PROCEDURE — 99999 PR PBB SHADOW E&M-EST. PATIENT-LVL III: CPT | Mod: PBBFAC,,, | Performed by: PHYSICIAN ASSISTANT

## 2020-11-12 PROCEDURE — 77066 DX MAMMO INCL CAD BI: CPT | Mod: TC

## 2020-11-12 PROCEDURE — 99213 PR OFFICE/OUTPT VISIT, EST, LEVL III, 20-29 MIN: ICD-10-PCS | Mod: S$GLB,,, | Performed by: PHYSICIAN ASSISTANT

## 2020-11-12 PROCEDURE — 77066 MAMMO DIGITAL DIAGNOSTIC BILAT WITH TOMO: ICD-10-PCS | Mod: 26,,, | Performed by: RADIOLOGY

## 2020-11-12 PROCEDURE — 77062 BREAST TOMOSYNTHESIS BI: CPT | Mod: 26,,, | Performed by: RADIOLOGY

## 2020-11-12 PROCEDURE — 99213 OFFICE O/P EST LOW 20 MIN: CPT | Mod: S$GLB,,, | Performed by: PHYSICIAN ASSISTANT

## 2020-11-12 PROCEDURE — 99999 PR PBB SHADOW E&M-EST. PATIENT-LVL III: ICD-10-PCS | Mod: PBBFAC,,, | Performed by: PHYSICIAN ASSISTANT

## 2020-11-12 NOTE — PROGRESS NOTES
Subjective:       Patient ID: Tosin Ma is a 65 y.o. female.    Chief Complaint: Follow-up (6 month ), Results (mammogram), and History of breast cancer    Mrs. Ma is a 65-year-old  female with a remote history of right breast cancer   treated in 1996 with lumpectomy, followed by radiation therapy. She did not receive chemotherapy or hormonal treatment.       She has some intermittent left breast pain that comes and goes.   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    She underwent biopsy of left breast abnormality on 10/6/17, that pathology was benign.  She is feeling well and no post biopsy issues.   Mrs. Ma is feeling well. No recent changes in health, feeling well.         Mammogram from today was unremarkable.     Review of Systems   Constitutional: Negative.    HENT: Negative for nasal congestion, rhinorrhea, sore throat and trouble swallowing.    Eyes: Negative for visual disturbance.   Respiratory: Negative for cough, chest tightness and shortness of breath.    Cardiovascular: Negative for chest pain, palpitations and leg swelling.   Gastrointestinal: Negative for abdominal pain, blood in stool, constipation, diarrhea, nausea and vomiting.   Genitourinary: Negative for dysuria, hematuria and vaginal bleeding.   Musculoskeletal: Negative for arthralgias, back pain and myalgias.   Integumentary:  Negative for pallor and rash.   Neurological: Negative for dizziness, weakness and headaches.   Hematological: Negative for adenopathy. Does not bruise/bleed easily.   Psychiatric/Behavioral: Negative for dysphoric mood and suicidal ideas. The patient is not nervous/anxious.          Objective:      Physical Exam  Vitals signs reviewed.   Constitutional:       General: She is not in acute distress.     Appearance: She is well-developed.      Comments: Presents alone     HENT:      Head: Normocephalic.      Mouth/Throat:      Pharynx: No oropharyngeal exudate.   Eyes:      General: No  scleral icterus.     Conjunctiva/sclera: Conjunctivae normal.   Neck:      Musculoskeletal: Normal range of motion and neck supple.      Thyroid: No thyromegaly.   Cardiovascular:      Rate and Rhythm: Normal rate and regular rhythm.      Heart sounds: Normal heart sounds.   Pulmonary:      Effort: Pulmonary effort is normal. No respiratory distress.      Breath sounds: Normal breath sounds.      Comments: Right breast with 1 cm mobile nodularity at 10 o'clock, right at areolar border.  This is in area of prior lumpectomy and has not changed from her last physical exam two years ago. Left breast without mass, nodule or skin changes. No axillary or supraclavicular adenopathy.  Abdominal:      General: Bowel sounds are normal. There is no distension.      Palpations: Abdomen is soft. There is no mass.      Tenderness: There is no abdominal tenderness.   Musculoskeletal:      Comments: No spinal or paraspinal tenderness to palpation     Lymphadenopathy:      Cervical: No cervical adenopathy.   Skin:     General: Skin is warm and dry.      Coloration: Skin is not pale.      Findings: No erythema or rash.   Neurological:      Mental Status: She is alert and oriented to person, place, and time.      Cranial Nerves: No cranial nerve deficit.   Psychiatric:         Behavior: Behavior normal.         Thought Content: Thought content normal.         Judgment: Judgment normal.         Assessment:       1. History of breast cancer        Plan:       Clinically without evidence of disease. RTC in one year with mammogram.

## 2022-01-04 ENCOUNTER — HOSPITAL ENCOUNTER (OUTPATIENT)
Dept: RADIOLOGY | Facility: HOSPITAL | Age: 67
Discharge: HOME | End: 2022-01-04
Attending: PHYSICIAN ASSISTANT
Payer: COMMERCIAL

## 2022-01-04 VITALS — WEIGHT: 142 LBS | BODY MASS INDEX: 26.81 KG/M2 | HEIGHT: 61 IN

## 2022-01-04 DIAGNOSIS — Z85.3 HISTORY OF BREAST CANCER: ICD-10-CM

## 2022-01-04 PROCEDURE — 77062 BREAST TOMOSYNTHESIS BI: CPT | Mod: 26,,, | Performed by: RADIOLOGY

## 2022-01-04 PROCEDURE — 77066 DX MAMMO INCL CAD BI: CPT | Mod: 26,,, | Performed by: RADIOLOGY

## 2022-01-04 PROCEDURE — 77062 MAMMO DIGITAL DIAGNOSTIC BILAT WITH TOMO: ICD-10-PCS | Mod: 26,,, | Performed by: RADIOLOGY

## 2022-01-04 PROCEDURE — 77066 MAMMO DIGITAL DIAGNOSTIC BILAT WITH TOMO: ICD-10-PCS | Mod: 26,,, | Performed by: RADIOLOGY

## 2022-01-04 PROCEDURE — 77062 BREAST TOMOSYNTHESIS BI: CPT | Mod: TC

## 2022-01-25 ENCOUNTER — OFFICE VISIT (OUTPATIENT)
Dept: HEMATOLOGY/ONCOLOGY | Facility: CLINIC | Age: 67
End: 2022-01-25
Payer: COMMERCIAL

## 2022-01-25 VITALS
WEIGHT: 145.94 LBS | DIASTOLIC BLOOD PRESSURE: 74 MMHG | BODY MASS INDEX: 27.55 KG/M2 | HEART RATE: 82 BPM | RESPIRATION RATE: 18 BRPM | HEIGHT: 61 IN | OXYGEN SATURATION: 98 % | SYSTOLIC BLOOD PRESSURE: 145 MMHG

## 2022-01-25 DIAGNOSIS — Z85.3 HISTORY OF BREAST CANCER: Primary | ICD-10-CM

## 2022-01-25 PROCEDURE — 99213 OFFICE O/P EST LOW 20 MIN: CPT | Mod: S$GLB,,, | Performed by: PHYSICIAN ASSISTANT

## 2022-01-25 PROCEDURE — 99213 PR OFFICE/OUTPT VISIT, EST, LEVL III, 20-29 MIN: ICD-10-PCS | Mod: S$GLB,,, | Performed by: PHYSICIAN ASSISTANT

## 2022-01-25 PROCEDURE — 99999 PR PBB SHADOW E&M-EST. PATIENT-LVL III: ICD-10-PCS | Mod: PBBFAC,,, | Performed by: PHYSICIAN ASSISTANT

## 2022-01-25 PROCEDURE — 99999 PR PBB SHADOW E&M-EST. PATIENT-LVL III: CPT | Mod: PBBFAC,,, | Performed by: PHYSICIAN ASSISTANT

## 2022-01-25 NOTE — PROGRESS NOTES
Subjective:       Patient ID: Tosin Ma is a 66 y.o. female.    Chief Complaint: History of breast cancer    Mrs. Ma is a 66- year-old  female with a remote history of right breast cancer   treated in 1996 with lumpectomy, followed by radiation therapy. She did not receive chemotherapy or hormonal treatment.       She has some intermittent left breast pain that comes and goes. Overall feeling well. No shortness of breath, headaches or back pain.   She continues to work remotely from home due to Covid.     She underwent biopsy of left breast abnormality on 10/6/17, that pathology was benign.  She is feeling well and no post biopsy issues.   Mrs. Ma is feeling well. No recent changes in health, feeling well.         Mammogram from 1/4/2022 was unremarkable.     Review of Systems   Constitutional: Negative.    HENT: Negative for nasal congestion, rhinorrhea, sore throat and trouble swallowing.    Eyes: Negative for visual disturbance.   Respiratory: Negative for cough, chest tightness and shortness of breath.    Cardiovascular: Negative for chest pain, palpitations and leg swelling.   Gastrointestinal: Negative for abdominal pain, blood in stool, constipation, diarrhea, nausea and vomiting.   Genitourinary: Negative for dysuria, hematuria and vaginal bleeding.   Musculoskeletal: Negative for arthralgias, back pain and myalgias.   Integumentary:  Negative for pallor and rash.   Neurological: Negative for dizziness, weakness and headaches.   Hematological: Negative for adenopathy. Does not bruise/bleed easily.   Psychiatric/Behavioral: Negative for dysphoric mood and suicidal ideas. The patient is not nervous/anxious.          Objective:      Physical Exam  Vitals reviewed.   Constitutional:       General: She is not in acute distress.     Appearance: She is well-developed.      Comments: Presents alone     HENT:      Head: Normocephalic.      Mouth/Throat:      Pharynx: No oropharyngeal  exudate.   Eyes:      General: No scleral icterus.     Conjunctiva/sclera: Conjunctivae normal.   Neck:      Thyroid: No thyromegaly.   Cardiovascular:      Rate and Rhythm: Normal rate and regular rhythm.      Heart sounds: Normal heart sounds.   Pulmonary:      Effort: Pulmonary effort is normal. No respiratory distress.      Breath sounds: Normal breath sounds.      Comments: Right breast with 1 cm mobile nodularity at 10 o'clock, right at areolar border.  This is in area of prior lumpectomy and has not changed from her last physical exam two years ago. Left breast without mass, nodule or skin changes. No axillary or supraclavicular adenopathy.  Abdominal:      General: Bowel sounds are normal. There is no distension.      Palpations: Abdomen is soft. There is no mass.      Tenderness: There is no abdominal tenderness.   Musculoskeletal:      Cervical back: Normal range of motion and neck supple.      Comments: No spinal or paraspinal tenderness to palpation     Lymphadenopathy:      Cervical: No cervical adenopathy.   Skin:     General: Skin is warm and dry.      Coloration: Skin is not pale.      Findings: No erythema or rash.   Neurological:      Mental Status: She is alert and oriented to person, place, and time.      Cranial Nerves: No cranial nerve deficit.   Psychiatric:         Behavior: Behavior normal.         Thought Content: Thought content normal.         Judgment: Judgment normal.         Assessment:       Problem List Items Addressed This Visit        Oncology    History of breast cancer - Primary          Plan:       Patient is clinically without evidence of disease. Return to clinic in one year with mammogram.

## 2023-03-21 ENCOUNTER — HOSPITAL ENCOUNTER (OUTPATIENT)
Dept: RADIOLOGY | Facility: HOSPITAL | Age: 68
Discharge: HOME | End: 2023-03-21
Attending: PHYSICIAN ASSISTANT
Payer: COMMERCIAL

## 2023-03-21 ENCOUNTER — OFFICE VISIT (OUTPATIENT)
Dept: HEMATOLOGY/ONCOLOGY | Facility: CLINIC | Age: 68
End: 2023-03-21
Payer: COMMERCIAL

## 2023-03-21 VITALS
RESPIRATION RATE: 18 BRPM | HEART RATE: 66 BPM | HEIGHT: 61 IN | BODY MASS INDEX: 26.93 KG/M2 | SYSTOLIC BLOOD PRESSURE: 133 MMHG | TEMPERATURE: 98 F | WEIGHT: 142.63 LBS | DIASTOLIC BLOOD PRESSURE: 74 MMHG | OXYGEN SATURATION: 99 %

## 2023-03-21 VITALS — BODY MASS INDEX: 26.83 KG/M2 | WEIGHT: 142 LBS

## 2023-03-21 DIAGNOSIS — Z85.3 HISTORY OF BREAST CANCER: ICD-10-CM

## 2023-03-21 DIAGNOSIS — Z85.3 HISTORY OF BREAST CANCER: Primary | ICD-10-CM

## 2023-03-21 PROCEDURE — 3078F DIAST BP <80 MM HG: CPT | Mod: CPTII,S$GLB,, | Performed by: PHYSICIAN ASSISTANT

## 2023-03-21 PROCEDURE — 1101F PR PT FALLS ASSESS DOC 0-1 FALLS W/OUT INJ PAST YR: ICD-10-PCS | Mod: CPTII,S$GLB,, | Performed by: PHYSICIAN ASSISTANT

## 2023-03-21 PROCEDURE — 1159F PR MEDICATION LIST DOCUMENTED IN MEDICAL RECORD: ICD-10-PCS | Mod: CPTII,S$GLB,, | Performed by: PHYSICIAN ASSISTANT

## 2023-03-21 PROCEDURE — 99999 PR PBB SHADOW E&M-EST. PATIENT-LVL III: ICD-10-PCS | Mod: PBBFAC,,, | Performed by: PHYSICIAN ASSISTANT

## 2023-03-21 PROCEDURE — 1159F MED LIST DOCD IN RCRD: CPT | Mod: CPTII,S$GLB,, | Performed by: PHYSICIAN ASSISTANT

## 2023-03-21 PROCEDURE — 99213 PR OFFICE/OUTPT VISIT, EST, LEVL III, 20-29 MIN: ICD-10-PCS | Mod: S$GLB,,, | Performed by: PHYSICIAN ASSISTANT

## 2023-03-21 PROCEDURE — 3288F FALL RISK ASSESSMENT DOCD: CPT | Mod: CPTII,S$GLB,, | Performed by: PHYSICIAN ASSISTANT

## 2023-03-21 PROCEDURE — 1101F PT FALLS ASSESS-DOCD LE1/YR: CPT | Mod: CPTII,S$GLB,, | Performed by: PHYSICIAN ASSISTANT

## 2023-03-21 PROCEDURE — 77066 DX MAMMO INCL CAD BI: CPT | Mod: 26,,, | Performed by: RADIOLOGY

## 2023-03-21 PROCEDURE — 1126F PR PAIN SEVERITY QUANTIFIED, NO PAIN PRESENT: ICD-10-PCS | Mod: CPTII,S$GLB,, | Performed by: PHYSICIAN ASSISTANT

## 2023-03-21 PROCEDURE — 3075F PR MOST RECENT SYSTOLIC BLOOD PRESS GE 130-139MM HG: ICD-10-PCS | Mod: CPTII,S$GLB,, | Performed by: PHYSICIAN ASSISTANT

## 2023-03-21 PROCEDURE — 3288F PR FALLS RISK ASSESSMENT DOCUMENTED: ICD-10-PCS | Mod: CPTII,S$GLB,, | Performed by: PHYSICIAN ASSISTANT

## 2023-03-21 PROCEDURE — 77062 MAMMO DIGITAL DIAGNOSTIC BILAT WITH TOMO: ICD-10-PCS | Mod: 26,,, | Performed by: RADIOLOGY

## 2023-03-21 PROCEDURE — 77066 MAMMO DIGITAL DIAGNOSTIC BILAT WITH TOMO: ICD-10-PCS | Mod: 26,,, | Performed by: RADIOLOGY

## 2023-03-21 PROCEDURE — 3078F PR MOST RECENT DIASTOLIC BLOOD PRESSURE < 80 MM HG: ICD-10-PCS | Mod: CPTII,S$GLB,, | Performed by: PHYSICIAN ASSISTANT

## 2023-03-21 PROCEDURE — 3008F PR BODY MASS INDEX (BMI) DOCUMENTED: ICD-10-PCS | Mod: CPTII,S$GLB,, | Performed by: PHYSICIAN ASSISTANT

## 2023-03-21 PROCEDURE — 77062 BREAST TOMOSYNTHESIS BI: CPT | Mod: 26,,, | Performed by: RADIOLOGY

## 2023-03-21 PROCEDURE — 99213 OFFICE O/P EST LOW 20 MIN: CPT | Mod: S$GLB,,, | Performed by: PHYSICIAN ASSISTANT

## 2023-03-21 PROCEDURE — 3075F SYST BP GE 130 - 139MM HG: CPT | Mod: CPTII,S$GLB,, | Performed by: PHYSICIAN ASSISTANT

## 2023-03-21 PROCEDURE — 99999 PR PBB SHADOW E&M-EST. PATIENT-LVL III: CPT | Mod: PBBFAC,,, | Performed by: PHYSICIAN ASSISTANT

## 2023-03-21 PROCEDURE — 3008F BODY MASS INDEX DOCD: CPT | Mod: CPTII,S$GLB,, | Performed by: PHYSICIAN ASSISTANT

## 2023-03-21 PROCEDURE — 1126F AMNT PAIN NOTED NONE PRSNT: CPT | Mod: CPTII,S$GLB,, | Performed by: PHYSICIAN ASSISTANT

## 2023-03-21 PROCEDURE — 77066 DX MAMMO INCL CAD BI: CPT | Mod: TC

## 2023-03-21 NOTE — PROGRESS NOTES
Subjective:       Patient ID: Tosin Ma is a 67 y.o. female.    Chief Complaint: History of breast cancer    Mrs. Ma is a 67- year-old  female with a remote history of right breast cancer   treated in 1996 with lumpectomy, followed by radiation therapy. She did not receive chemotherapy or hormonal treatment.       Overall feeling well. No shortness of breath, headaches or back pain.  No breast pain.   She continues to work remotely [employer], at work on Tues and Thursday.     She underwent biopsy of left breast abnormality on 10/6/17, that pathology was benign.  .          Mammogram from today was unremarkable.     Review of Systems   Constitutional: Negative.    HENT:  Negative for nasal congestion, rhinorrhea, sore throat and trouble swallowing.    Eyes:  Negative for visual disturbance.   Respiratory:  Negative for cough, chest tightness and shortness of breath.    Cardiovascular:  Negative for chest pain, palpitations and leg swelling.   Gastrointestinal:  Negative for abdominal pain, blood in stool, constipation, diarrhea, nausea and vomiting.   Genitourinary:  Negative for dysuria, hematuria and vaginal bleeding.   Musculoskeletal:  Negative for arthralgias, back pain and myalgias.   Integumentary:  Negative for pallor and rash.   Neurological:  Negative for dizziness, weakness and headaches.   Hematological:  Negative for adenopathy. Does not bruise/bleed easily.   Psychiatric/Behavioral:  Negative for dysphoric mood and suicidal ideas. The patient is not nervous/anxious.        Objective:      Physical Exam  Vitals reviewed.   Constitutional:       General: She is not in acute distress.     Appearance: She is well-developed.      Comments: Presents alone     HENT:      Head: Normocephalic.      Mouth/Throat:      Pharynx: No oropharyngeal exudate.   Eyes:      General: No scleral icterus.     Conjunctiva/sclera: Conjunctivae normal.   Neck:      Thyroid: No thyromegaly.    Cardiovascular:      Rate and Rhythm: Normal rate and regular rhythm.      Heart sounds: Normal heart sounds.   Pulmonary:      Effort: Pulmonary effort is normal. No respiratory distress.      Breath sounds: Normal breath sounds.      Comments: Right breast with 1 cm mobile nodularity at 10 o'clock, right at areolar border.  This is in area of prior lumpectomy and has not changed from her last physical exam. Left breast without mass, nodule or skin changes. No axillary or supraclavicular adenopathy.  Abdominal:      General: Bowel sounds are normal. There is no distension.      Palpations: Abdomen is soft. There is no mass.      Tenderness: There is no abdominal tenderness.   Musculoskeletal:      Cervical back: Normal range of motion and neck supple.      Comments: No spinal or paraspinal tenderness to palpation     Lymphadenopathy:      Cervical: No cervical adenopathy.   Skin:     General: Skin is warm and dry.      Coloration: Skin is not pale.      Findings: No erythema or rash.   Neurological:      Mental Status: She is alert and oriented to person, place, and time.      Cranial Nerves: No cranial nerve deficit.   Psychiatric:         Behavior: Behavior normal.         Thought Content: Thought content normal.         Judgment: Judgment normal.       Assessment:       Problem List Items Addressed This Visit          Oncology    History of breast cancer - Primary       Plan:       Patient clinically without evidence of disease.  RTC in one year with mammogram.

## 2024-01-01 ENCOUNTER — PATIENT MESSAGE (OUTPATIENT)
Dept: INTERVENTIONAL RADIOLOGY/VASCULAR | Facility: CLINIC | Age: 69
End: 2024-01-01
Payer: COMMERCIAL

## 2024-01-01 ENCOUNTER — TELEPHONE (OUTPATIENT)
Dept: SURGERY | Facility: CLINIC | Age: 69
End: 2024-01-01
Payer: COMMERCIAL

## 2024-01-01 ENCOUNTER — TELEPHONE (OUTPATIENT)
Dept: INTERVENTIONAL RADIOLOGY/VASCULAR | Facility: HOSPITAL | Age: 69
End: 2024-01-01
Payer: COMMERCIAL

## 2024-01-01 ENCOUNTER — HOSPITAL ENCOUNTER (INPATIENT)
Facility: HOSPITAL | Age: 69
LOS: 4 days | DRG: 871 | End: 2024-11-27
Attending: EMERGENCY MEDICINE | Admitting: INTERNAL MEDICINE
Payer: COMMERCIAL

## 2024-01-01 VITALS
BODY MASS INDEX: 26.35 KG/M2 | WEIGHT: 139.56 LBS | RESPIRATION RATE: 28 BRPM | TEMPERATURE: 98 F | DIASTOLIC BLOOD PRESSURE: 50 MMHG | SYSTOLIC BLOOD PRESSURE: 79 MMHG | HEIGHT: 61 IN | OXYGEN SATURATION: 97 % | HEART RATE: 100 BPM

## 2024-01-01 DIAGNOSIS — R11.2 NAUSEA & VOMITING: ICD-10-CM

## 2024-01-01 DIAGNOSIS — E86.0 DEHYDRATION: ICD-10-CM

## 2024-01-01 DIAGNOSIS — R11.2 NAUSEA AND VOMITING, UNSPECIFIED VOMITING TYPE: Primary | ICD-10-CM

## 2024-01-01 DIAGNOSIS — R07.9 CHEST PAIN: ICD-10-CM

## 2024-01-01 DIAGNOSIS — C18.9 COLON CANCER: ICD-10-CM

## 2024-01-01 DIAGNOSIS — C18.6 MALIGNANT NEOPLASM OF DESCENDING COLON: ICD-10-CM

## 2024-01-01 DIAGNOSIS — E87.20 LACTIC ACIDOSIS: ICD-10-CM

## 2024-01-01 DIAGNOSIS — E87.5 HYPERKALEMIA: ICD-10-CM

## 2024-01-01 DIAGNOSIS — N17.9 AKI (ACUTE KIDNEY INJURY): ICD-10-CM

## 2024-01-01 DIAGNOSIS — E87.20 METABOLIC ACIDOSIS: ICD-10-CM

## 2024-01-01 DIAGNOSIS — R94.31 QT PROLONGATION: ICD-10-CM

## 2024-01-01 LAB
ADENOVIRUS: NOT DETECTED
ALBUMIN SERPL BCP-MCNC: 1.5 G/DL (ref 3.5–5.2)
ALBUMIN SERPL BCP-MCNC: 1.6 G/DL (ref 3.5–5.2)
ALBUMIN SERPL BCP-MCNC: 1.8 G/DL (ref 3.5–5.2)
ALLENS TEST: ABNORMAL
ALP SERPL-CCNC: 1595 U/L (ref 40–150)
ALP SERPL-CCNC: 1663 U/L (ref 40–150)
ALP SERPL-CCNC: 1719 U/L (ref 40–150)
ALP SERPL-CCNC: 1798 U/L (ref 40–150)
ALT SERPL W/O P-5'-P-CCNC: 105 U/L (ref 10–44)
ALT SERPL W/O P-5'-P-CCNC: 109 U/L (ref 10–44)
ALT SERPL W/O P-5'-P-CCNC: 116 U/L (ref 10–44)
ALT SERPL W/O P-5'-P-CCNC: 99 U/L (ref 10–44)
AMMONIA PLAS-SCNC: 120 UMOL/L (ref 10–50)
AMORPH CRY UR QL COMP ASSIST: ABNORMAL
ANION GAP SERPL CALC-SCNC: 29 MMOL/L (ref 8–16)
ANION GAP SERPL CALC-SCNC: 30 MMOL/L (ref 8–16)
ANION GAP SERPL CALC-SCNC: 33 MMOL/L (ref 8–16)
ANISOCYTOSIS BLD QL SMEAR: SLIGHT
ANISOCYTOSIS BLD QL SMEAR: SLIGHT
APAP SERPL-MCNC: <3 UG/ML (ref 10–20)
AST SERPL-CCNC: 149 U/L (ref 10–40)
AST SERPL-CCNC: 173 U/L (ref 10–40)
AST SERPL-CCNC: 183 U/L (ref 10–40)
AST SERPL-CCNC: 220 U/L (ref 10–40)
B-OH-BUTYR BLD STRIP-SCNC: 3.7 MMOL/L (ref 0–0.5)
BACTERIA #/AREA URNS AUTO: ABNORMAL /HPF
BASO STIPL BLD QL SMEAR: ABNORMAL
BASO STIPL BLD QL SMEAR: ABNORMAL
BASOPHILS # BLD AUTO: 0.01 K/UL (ref 0–0.2)
BASOPHILS # BLD AUTO: 0.01 K/UL (ref 0–0.2)
BASOPHILS # BLD AUTO: 0.02 K/UL (ref 0–0.2)
BASOPHILS # BLD AUTO: 0.02 K/UL (ref 0–0.2)
BASOPHILS NFR BLD: 0.1 % (ref 0–1.9)
BASOPHILS NFR BLD: 0.2 % (ref 0–1.9)
BILIRUB DIRECT SERPL-MCNC: 13.4 MG/DL (ref 0.1–0.3)
BILIRUB SERPL-MCNC: 16.5 MG/DL (ref 0.1–1)
BILIRUB SERPL-MCNC: 17.1 MG/DL (ref 0.1–1)
BILIRUB SERPL-MCNC: 17.3 MG/DL (ref 0.1–1)
BILIRUB SERPL-MCNC: 18.1 MG/DL (ref 0.1–1)
BILIRUB UR QL STRIP: ABNORMAL
BORDETELLA PARAPERTUSSIS (IS1001): NOT DETECTED
BORDETELLA PERTUSSIS (PTXP): NOT DETECTED
BUN SERPL-MCNC: 56 MG/DL (ref 8–23)
BUN SERPL-MCNC: 57 MG/DL (ref 8–23)
BUN SERPL-MCNC: 61 MG/DL (ref 8–23)
BUN SERPL-MCNC: 65 MG/DL (ref 8–23)
BUN SERPL-MCNC: 68 MG/DL (ref 8–23)
BUN SERPL-MCNC: 68 MG/DL (ref 8–23)
CALCIUM SERPL-MCNC: 8.7 MG/DL (ref 8.7–10.5)
CALCIUM SERPL-MCNC: 8.7 MG/DL (ref 8.7–10.5)
CALCIUM SERPL-MCNC: 8.9 MG/DL (ref 8.7–10.5)
CALCIUM SERPL-MCNC: 8.9 MG/DL (ref 8.7–10.5)
CALCIUM SERPL-MCNC: 9.2 MG/DL (ref 8.7–10.5)
CALCIUM SERPL-MCNC: 9.6 MG/DL (ref 8.7–10.5)
CEA SERPL-MCNC: 6655.5 NG/ML (ref 0–5)
CHLAMYDIA PNEUMONIAE: NOT DETECTED
CHLORIDE SERPL-SCNC: 95 MMOL/L (ref 95–110)
CHLORIDE SERPL-SCNC: 96 MMOL/L (ref 95–110)
CHLORIDE SERPL-SCNC: 96 MMOL/L (ref 95–110)
CHLORIDE SERPL-SCNC: 98 MMOL/L (ref 95–110)
CLARITY UR REFRACT.AUTO: ABNORMAL
CO2 SERPL-SCNC: 10 MMOL/L (ref 23–29)
CO2 SERPL-SCNC: 8 MMOL/L (ref 23–29)
CO2 SERPL-SCNC: 9 MMOL/L (ref 23–29)
COLOR UR AUTO: YELLOW
CORONAVIRUS 229E, COMMON COLD VIRUS: NOT DETECTED
CORONAVIRUS HKU1, COMMON COLD VIRUS: NOT DETECTED
CORONAVIRUS NL63, COMMON COLD VIRUS: NOT DETECTED
CORONAVIRUS OC43, COMMON COLD VIRUS: NOT DETECTED
CREAT SERPL-MCNC: 1.3 MG/DL (ref 0.5–1.4)
CREAT SERPL-MCNC: 1.6 MG/DL (ref 0.5–1.4)
CREAT SERPL-MCNC: 2.3 MG/DL (ref 0.5–1.4)
CREAT SERPL-MCNC: 2.3 MG/DL (ref 0.5–1.4)
CREAT SERPL-MCNC: 2.5 MG/DL (ref 0.5–1.4)
CREAT SERPL-MCNC: 2.5 MG/DL (ref 0.5–1.4)
DELSYS: ABNORMAL
DIFFERENTIAL METHOD BLD: ABNORMAL
EOSINOPHIL # BLD AUTO: 0 K/UL (ref 0–0.5)
EOSINOPHIL NFR BLD: 0 % (ref 0–8)
ERYTHROCYTE [DISTWIDTH] IN BLOOD BY AUTOMATED COUNT: 18 % (ref 11.5–14.5)
ERYTHROCYTE [DISTWIDTH] IN BLOOD BY AUTOMATED COUNT: 18.2 % (ref 11.5–14.5)
ERYTHROCYTE [DISTWIDTH] IN BLOOD BY AUTOMATED COUNT: 18.4 % (ref 11.5–14.5)
ERYTHROCYTE [DISTWIDTH] IN BLOOD BY AUTOMATED COUNT: 18.6 % (ref 11.5–14.5)
EST. GFR  (NO RACE VARIABLE): 20.6 ML/MIN/1.73 M^2
EST. GFR  (NO RACE VARIABLE): 20.6 ML/MIN/1.73 M^2
EST. GFR  (NO RACE VARIABLE): 22.7 ML/MIN/1.73 M^2
EST. GFR  (NO RACE VARIABLE): 22.7 ML/MIN/1.73 M^2
EST. GFR  (NO RACE VARIABLE): 35.1 ML/MIN/1.73 M^2
EST. GFR  (NO RACE VARIABLE): 45.1 ML/MIN/1.73 M^2
FIO2: 21
FIO2: 21
FLUBV RNA NPH QL NAA+NON-PROBE: NOT DETECTED
GLUCOSE SERPL-MCNC: 116 MG/DL (ref 70–110)
GLUCOSE SERPL-MCNC: 143 MG/DL (ref 70–110)
GLUCOSE SERPL-MCNC: 58 MG/DL (ref 70–110)
GLUCOSE SERPL-MCNC: 65 MG/DL (ref 70–110)
GLUCOSE SERPL-MCNC: 79 MG/DL (ref 70–110)
GLUCOSE SERPL-MCNC: 79 MG/DL (ref 70–110)
GLUCOSE UR QL STRIP: NEGATIVE
HCO3 UR-SCNC: 11.2 MMOL/L (ref 24–28)
HCO3 UR-SCNC: 11.7 MMOL/L (ref 24–28)
HCO3 UR-SCNC: 12.5 MMOL/L (ref 24–28)
HCT VFR BLD AUTO: 26.6 % (ref 37–48.5)
HCT VFR BLD AUTO: 26.8 % (ref 37–48.5)
HCT VFR BLD AUTO: 26.8 % (ref 37–48.5)
HCT VFR BLD AUTO: 28.5 % (ref 37–48.5)
HCT VFR BLD CALC: 30 %PCV (ref 36–54)
HCT VFR BLD CALC: 52 %PCV (ref 36–54)
HGB BLD-MCNC: 8.3 G/DL (ref 12–16)
HGB BLD-MCNC: 8.4 G/DL (ref 12–16)
HGB BLD-MCNC: 8.5 G/DL (ref 12–16)
HGB BLD-MCNC: 8.7 G/DL (ref 12–16)
HGB UR QL STRIP: ABNORMAL
HPIV1 RNA NPH QL NAA+NON-PROBE: NOT DETECTED
HPIV2 RNA NPH QL NAA+NON-PROBE: NOT DETECTED
HPIV3 RNA NPH QL NAA+NON-PROBE: NOT DETECTED
HPIV4 RNA NPH QL NAA+NON-PROBE: NOT DETECTED
HUMAN METAPNEUMOVIRUS: NOT DETECTED
HYALINE CASTS UR QL AUTO: 0 /LPF
HYPOCHROMIA BLD QL SMEAR: ABNORMAL
HYPOCHROMIA BLD QL SMEAR: ABNORMAL
IMM GRANULOCYTES # BLD AUTO: 0.1 K/UL (ref 0–0.04)
IMM GRANULOCYTES # BLD AUTO: 0.1 K/UL (ref 0–0.04)
IMM GRANULOCYTES # BLD AUTO: 0.17 K/UL (ref 0–0.04)
IMM GRANULOCYTES # BLD AUTO: 0.26 K/UL (ref 0–0.04)
IMM GRANULOCYTES NFR BLD AUTO: 0.7 % (ref 0–0.5)
IMM GRANULOCYTES NFR BLD AUTO: 0.7 % (ref 0–0.5)
IMM GRANULOCYTES NFR BLD AUTO: 1.3 % (ref 0–0.5)
IMM GRANULOCYTES NFR BLD AUTO: 2.3 % (ref 0–0.5)
INFLUENZA A (SUBTYPES H1,H1-2009,H3): NOT DETECTED
INR PPP: 2.1 (ref 0.8–1.2)
KETONES UR QL STRIP: ABNORMAL
LACTATE SERPL-SCNC: >12 MMOL/L (ref 0.5–2.2)
LDH SERPL L TO P-CCNC: 13.75 MMOL/L (ref 0.5–2.2)
LEUKOCYTE ESTERASE UR QL STRIP: NEGATIVE
LIPASE SERPL-CCNC: 23 U/L (ref 4–60)
LYMPHOCYTES # BLD AUTO: 0.8 K/UL (ref 1–4.8)
LYMPHOCYTES # BLD AUTO: 0.8 K/UL (ref 1–4.8)
LYMPHOCYTES # BLD AUTO: 0.9 K/UL (ref 1–4.8)
LYMPHOCYTES # BLD AUTO: 1 K/UL (ref 1–4.8)
LYMPHOCYTES NFR BLD: 5.1 % (ref 18–48)
LYMPHOCYTES NFR BLD: 5.2 % (ref 18–48)
LYMPHOCYTES NFR BLD: 7.3 % (ref 18–48)
LYMPHOCYTES NFR BLD: 7.7 % (ref 18–48)
MAGNESIUM SERPL-MCNC: 2.6 MG/DL (ref 1.6–2.6)
MAGNESIUM SERPL-MCNC: 2.6 MG/DL (ref 1.6–2.6)
MAGNESIUM SERPL-MCNC: 2.7 MG/DL (ref 1.6–2.6)
MCH RBC QN AUTO: 28.3 PG (ref 27–31)
MCH RBC QN AUTO: 28.4 PG (ref 27–31)
MCH RBC QN AUTO: 28.5 PG (ref 27–31)
MCH RBC QN AUTO: 28.6 PG (ref 27–31)
MCHC RBC AUTO-ENTMCNC: 30.5 G/DL (ref 32–36)
MCHC RBC AUTO-ENTMCNC: 31 G/DL (ref 32–36)
MCHC RBC AUTO-ENTMCNC: 31.3 G/DL (ref 32–36)
MCHC RBC AUTO-ENTMCNC: 32 G/DL (ref 32–36)
MCV RBC AUTO: 90 FL (ref 82–98)
MCV RBC AUTO: 90 FL (ref 82–98)
MCV RBC AUTO: 92 FL (ref 82–98)
MCV RBC AUTO: 93 FL (ref 82–98)
MICROSCOPIC COMMENT: ABNORMAL
MODE: ABNORMAL
MONOCYTES # BLD AUTO: 0.5 K/UL (ref 0.3–1)
MONOCYTES # BLD AUTO: 0.6 K/UL (ref 0.3–1)
MONOCYTES # BLD AUTO: 0.7 K/UL (ref 0.3–1)
MONOCYTES # BLD AUTO: 0.7 K/UL (ref 0.3–1)
MONOCYTES NFR BLD: 3.6 % (ref 4–15)
MONOCYTES NFR BLD: 3.8 % (ref 4–15)
MONOCYTES NFR BLD: 5.4 % (ref 4–15)
MONOCYTES NFR BLD: 6 % (ref 4–15)
MYCOPLASMA PNEUMONIAE: NOT DETECTED
NEUTROPHILS # BLD AUTO: 11.2 K/UL (ref 1.8–7.7)
NEUTROPHILS # BLD AUTO: 13.2 K/UL (ref 1.8–7.7)
NEUTROPHILS # BLD AUTO: 13.6 K/UL (ref 1.8–7.7)
NEUTROPHILS # BLD AUTO: 9.5 K/UL (ref 1.8–7.7)
NEUTROPHILS NFR BLD: 83.8 % (ref 38–73)
NEUTROPHILS NFR BLD: 85.9 % (ref 38–73)
NEUTROPHILS NFR BLD: 90.2 % (ref 38–73)
NEUTROPHILS NFR BLD: 90.5 % (ref 38–73)
NITRITE UR QL STRIP: NEGATIVE
NRBC BLD-RTO: 2 /100 WBC
NRBC BLD-RTO: 3 /100 WBC
NRBC BLD-RTO: 4 /100 WBC
NRBC BLD-RTO: 5 /100 WBC
PCO2 BLDA: 27.8 MMHG (ref 35–45)
PCO2 BLDA: 29.2 MMHG (ref 35–45)
PCO2 BLDA: 32.7 MMHG (ref 35–45)
PH SMN: 7.19 [PH] (ref 7.35–7.45)
PH SMN: 7.21 [PH] (ref 7.35–7.45)
PH SMN: 7.21 [PH] (ref 7.35–7.45)
PH UR STRIP: 6 [PH] (ref 5–8)
PHOSPHATE SERPL-MCNC: 2.8 MG/DL (ref 2.7–4.5)
PHOSPHATE SERPL-MCNC: 3.4 MG/DL (ref 2.7–4.5)
PHOSPHATE SERPL-MCNC: 3.4 MG/DL (ref 2.7–4.5)
PLATELET # BLD AUTO: 576 K/UL (ref 150–450)
PLATELET # BLD AUTO: 577 K/UL (ref 150–450)
PLATELET # BLD AUTO: 585 K/UL (ref 150–450)
PLATELET # BLD AUTO: 614 K/UL (ref 150–450)
PLATELET BLD QL SMEAR: ABNORMAL
PLATELET BLD QL SMEAR: ABNORMAL
PMV BLD AUTO: 9.1 FL (ref 9.2–12.9)
PMV BLD AUTO: 9.2 FL (ref 9.2–12.9)
PMV BLD AUTO: 9.3 FL (ref 9.2–12.9)
PMV BLD AUTO: 9.4 FL (ref 9.2–12.9)
PO2 BLDA: 36 MMHG (ref 40–60)
PO2 BLDA: 60 MMHG (ref 40–60)
PO2 BLDA: 72 MMHG (ref 40–60)
POC BE: -16 MMOL/L
POC BE: -16 MMOL/L
POC BE: -17 MMOL/L
POC IONIZED CALCIUM: 0.81 MMOL/L (ref 1.06–1.42)
POC IONIZED CALCIUM: 1.05 MMOL/L (ref 1.06–1.42)
POC SATURATED O2: 56 % (ref 95–100)
POC SATURATED O2: 86 % (ref 95–100)
POC SATURATED O2: 91 % (ref 95–100)
POC TCO2: 12 MMOL/L (ref 24–29)
POC TCO2: 13 MMOL/L (ref 24–29)
POC TCO2: 13 MMOL/L (ref 24–29)
POCT GLUCOSE: 104 MG/DL (ref 70–110)
POCT GLUCOSE: 125 MG/DL (ref 70–110)
POCT GLUCOSE: 126 MG/DL (ref 70–110)
POCT GLUCOSE: 142 MG/DL (ref 70–110)
POCT GLUCOSE: 147 MG/DL (ref 70–110)
POCT GLUCOSE: 152 MG/DL (ref 70–110)
POCT GLUCOSE: 168 MG/DL (ref 70–110)
POCT GLUCOSE: 386 MG/DL (ref 70–110)
POCT GLUCOSE: 58 MG/DL (ref 70–110)
POCT GLUCOSE: 60 MG/DL (ref 70–110)
POCT GLUCOSE: 63 MG/DL (ref 70–110)
POCT GLUCOSE: 67 MG/DL (ref 70–110)
POCT GLUCOSE: 70 MG/DL (ref 70–110)
POCT GLUCOSE: 78 MG/DL (ref 70–110)
POCT GLUCOSE: 95 MG/DL (ref 70–110)
POIKILOCYTOSIS BLD QL SMEAR: SLIGHT
POIKILOCYTOSIS BLD QL SMEAR: SLIGHT
POLYCHROMASIA BLD QL SMEAR: ABNORMAL
POLYCHROMASIA BLD QL SMEAR: ABNORMAL
POTASSIUM BLD-SCNC: 5.5 MMOL/L (ref 3.5–5.1)
POTASSIUM BLD-SCNC: >9 MMOL/L (ref 3.5–5.1)
POTASSIUM SERPL-SCNC: 3.8 MMOL/L (ref 3.5–5.1)
POTASSIUM SERPL-SCNC: 4.1 MMOL/L (ref 3.5–5.1)
POTASSIUM SERPL-SCNC: 4.1 MMOL/L (ref 3.5–5.1)
POTASSIUM SERPL-SCNC: 4.2 MMOL/L (ref 3.5–5.1)
POTASSIUM SERPL-SCNC: 4.2 MMOL/L (ref 3.5–5.1)
POTASSIUM SERPL-SCNC: 4.3 MMOL/L (ref 3.5–5.1)
PROT SERPL-MCNC: 5.6 G/DL (ref 6–8.4)
PROT SERPL-MCNC: 5.6 G/DL (ref 6–8.4)
PROT SERPL-MCNC: 5.8 G/DL (ref 6–8.4)
PROT SERPL-MCNC: 6.5 G/DL (ref 6–8.4)
PROT UR QL STRIP: ABNORMAL
PROTHROMBIN TIME: 22.4 SEC (ref 9–12.5)
RBC # BLD AUTO: 2.92 M/UL (ref 4–5.4)
RBC # BLD AUTO: 2.97 M/UL (ref 4–5.4)
RBC # BLD AUTO: 2.97 M/UL (ref 4–5.4)
RBC # BLD AUTO: 3.05 M/UL (ref 4–5.4)
RBC #/AREA URNS AUTO: 11 /HPF (ref 0–4)
RESPIRATORY INFECTION PANEL SOURCE: NORMAL
RSV RNA NPH QL NAA+NON-PROBE: NOT DETECTED
RV+EV RNA NPH QL NAA+NON-PROBE: NOT DETECTED
SALICYLATES SERPL-MCNC: <5 MG/DL (ref 15–30)
SAMPLE: ABNORMAL
SARS-COV-2 RNA RESP QL NAA+PROBE: NOT DETECTED
SITE: ABNORMAL
SODIUM BLD-SCNC: 123 MMOL/L (ref 136–145)
SODIUM BLD-SCNC: 133 MMOL/L (ref 136–145)
SODIUM SERPL-SCNC: 132 MMOL/L (ref 136–145)
SODIUM SERPL-SCNC: 134 MMOL/L (ref 136–145)
SODIUM SERPL-SCNC: 137 MMOL/L (ref 136–145)
SODIUM SERPL-SCNC: 137 MMOL/L (ref 136–145)
SP GR UR STRIP: >1.03 (ref 1–1.03)
SPHEROCYTES BLD QL SMEAR: ABNORMAL
SPHEROCYTES BLD QL SMEAR: ABNORMAL
SQUAMOUS #/AREA URNS AUTO: 0 /HPF
TARGETS BLD QL SMEAR: ABNORMAL
TARGETS BLD QL SMEAR: ABNORMAL
URN SPEC COLLECT METH UR: ABNORMAL
WBC # BLD AUTO: 11.35 K/UL (ref 3.9–12.7)
WBC # BLD AUTO: 13.07 K/UL (ref 3.9–12.7)
WBC # BLD AUTO: 14.63 K/UL (ref 3.9–12.7)
WBC # BLD AUTO: 15.04 K/UL (ref 3.9–12.7)
WBC #/AREA URNS AUTO: 0 /HPF (ref 0–5)

## 2024-01-01 PROCEDURE — 20000000 HC ICU ROOM

## 2024-01-01 PROCEDURE — 94761 N-INVAS EAR/PLS OXIMETRY MLT: CPT

## 2024-01-01 PROCEDURE — 63600175 PHARM REV CODE 636 W HCPCS

## 2024-01-01 PROCEDURE — 20600001 HC STEP DOWN PRIVATE ROOM

## 2024-01-01 PROCEDURE — 82800 BLOOD PH: CPT

## 2024-01-01 PROCEDURE — 99498 ADVNCD CARE PLAN ADDL 30 MIN: CPT | Mod: ,,,

## 2024-01-01 PROCEDURE — 63600175 PHARM REV CODE 636 W HCPCS: Performed by: INTERNAL MEDICINE

## 2024-01-01 PROCEDURE — 99223 1ST HOSP IP/OBS HIGH 75: CPT | Mod: ,,, | Performed by: INTERNAL MEDICINE

## 2024-01-01 PROCEDURE — 82378 CARCINOEMBRYONIC ANTIGEN: CPT

## 2024-01-01 PROCEDURE — 80143 DRUG ASSAY ACETAMINOPHEN: CPT | Performed by: FAMILY MEDICINE

## 2024-01-01 PROCEDURE — 85025 COMPLETE CBC W/AUTO DIFF WBC: CPT

## 2024-01-01 PROCEDURE — 25000003 PHARM REV CODE 250

## 2024-01-01 PROCEDURE — 87486 CHLMYD PNEUM DNA AMP PROBE: CPT

## 2024-01-01 PROCEDURE — 63600175 PHARM REV CODE 636 W HCPCS: Performed by: STUDENT IN AN ORGANIZED HEALTH CARE EDUCATION/TRAINING PROGRAM

## 2024-01-01 PROCEDURE — 96361 HYDRATE IV INFUSION ADD-ON: CPT

## 2024-01-01 PROCEDURE — 81001 URINALYSIS AUTO W/SCOPE: CPT

## 2024-01-01 PROCEDURE — 80048 BASIC METABOLIC PNL TOTAL CA: CPT | Mod: XB | Performed by: INTERNAL MEDICINE

## 2024-01-01 PROCEDURE — 51798 US URINE CAPACITY MEASURE: CPT

## 2024-01-01 PROCEDURE — 99233 SBSQ HOSP IP/OBS HIGH 50: CPT | Mod: GT,,, | Performed by: STUDENT IN AN ORGANIZED HEALTH CARE EDUCATION/TRAINING PROGRAM

## 2024-01-01 PROCEDURE — 80076 HEPATIC FUNCTION PANEL: CPT | Performed by: FAMILY MEDICINE

## 2024-01-01 PROCEDURE — 80053 COMPREHEN METABOLIC PANEL: CPT

## 2024-01-01 PROCEDURE — 99223 1ST HOSP IP/OBS HIGH 75: CPT | Mod: ,,,

## 2024-01-01 PROCEDURE — 99900035 HC TECH TIME PER 15 MIN (STAT)

## 2024-01-01 PROCEDURE — 82803 BLOOD GASES ANY COMBINATION: CPT

## 2024-01-01 PROCEDURE — 27000923 HC TRIALYSIS CATHETER, ANY SIZE

## 2024-01-01 PROCEDURE — 36415 COLL VENOUS BLD VENIPUNCTURE: CPT | Performed by: FAMILY MEDICINE

## 2024-01-01 PROCEDURE — 5A1D70Z PERFORMANCE OF URINARY FILTRATION, INTERMITTENT, LESS THAN 6 HOURS PER DAY: ICD-10-PCS | Performed by: INTERNAL MEDICINE

## 2024-01-01 PROCEDURE — 87040 BLOOD CULTURE FOR BACTERIA: CPT

## 2024-01-01 PROCEDURE — 83735 ASSAY OF MAGNESIUM: CPT

## 2024-01-01 PROCEDURE — 84100 ASSAY OF PHOSPHORUS: CPT

## 2024-01-01 PROCEDURE — 83605 ASSAY OF LACTIC ACID: CPT

## 2024-01-01 PROCEDURE — 82962 GLUCOSE BLOOD TEST: CPT

## 2024-01-01 PROCEDURE — 83605 ASSAY OF LACTIC ACID: CPT | Performed by: FAMILY MEDICINE

## 2024-01-01 PROCEDURE — 25000003 PHARM REV CODE 250: Performed by: FAMILY MEDICINE

## 2024-01-01 PROCEDURE — 99285 EMERGENCY DEPT VISIT HI MDM: CPT | Mod: 25

## 2024-01-01 PROCEDURE — 02H633Z INSERTION OF INFUSION DEVICE INTO RIGHT ATRIUM, PERCUTANEOUS APPROACH: ICD-10-PCS | Performed by: EMERGENCY MEDICINE

## 2024-01-01 PROCEDURE — 25500020 PHARM REV CODE 255: Performed by: INTERNAL MEDICINE

## 2024-01-01 PROCEDURE — 36415 COLL VENOUS BLD VENIPUNCTURE: CPT

## 2024-01-01 PROCEDURE — 99238 HOSP IP/OBS DSCHRG MGMT 30/<: CPT | Mod: ,,, | Performed by: NURSE PRACTITIONER

## 2024-01-01 PROCEDURE — 80179 DRUG ASSAY SALICYLATE: CPT | Performed by: FAMILY MEDICINE

## 2024-01-01 PROCEDURE — 82140 ASSAY OF AMMONIA: CPT | Performed by: FAMILY MEDICINE

## 2024-01-01 PROCEDURE — 36556 INSERT NON-TUNNEL CV CATH: CPT

## 2024-01-01 PROCEDURE — 83690 ASSAY OF LIPASE: CPT

## 2024-01-01 PROCEDURE — 90945 DIALYSIS ONE EVALUATION: CPT

## 2024-01-01 PROCEDURE — C1752 CATH,HEMODIALYSIS,SHORT-TERM: HCPCS

## 2024-01-01 PROCEDURE — 85610 PROTHROMBIN TIME: CPT

## 2024-01-01 PROCEDURE — 99497 ADVNCD CARE PLAN 30 MIN: CPT | Mod: 25,,,

## 2024-01-01 PROCEDURE — 96375 TX/PRO/DX INJ NEW DRUG ADDON: CPT

## 2024-01-01 PROCEDURE — 85025 COMPLETE CBC W/AUTO DIFF WBC: CPT | Mod: 91 | Performed by: FAMILY MEDICINE

## 2024-01-01 PROCEDURE — 80048 BASIC METABOLIC PNL TOTAL CA: CPT | Mod: 91,XB | Performed by: FAMILY MEDICINE

## 2024-01-01 PROCEDURE — 11000001 HC ACUTE MED/SURG PRIVATE ROOM

## 2024-01-01 PROCEDURE — 82010 KETONE BODYS QUAN: CPT

## 2024-01-01 PROCEDURE — 96365 THER/PROPH/DIAG IV INF INIT: CPT

## 2024-01-01 RX ORDER — NIFEDIPINE 30 MG/1
60 TABLET, EXTENDED RELEASE ORAL DAILY
Status: DISCONTINUED | OUTPATIENT
Start: 2024-01-01 | End: 2024-01-01

## 2024-01-01 RX ORDER — AMOXICILLIN 250 MG
1 CAPSULE ORAL DAILY
Status: DISCONTINUED | OUTPATIENT
Start: 2024-01-01 | End: 2024-01-01

## 2024-01-01 RX ORDER — MORPHINE SULFATE 4 MG/ML
4 INJECTION, SOLUTION INTRAMUSCULAR; INTRAVENOUS
Status: DISCONTINUED | OUTPATIENT
Start: 2024-01-01 | End: 2024-01-01

## 2024-01-01 RX ORDER — CEFEPIME HYDROCHLORIDE 1 G/1
1 INJECTION, POWDER, FOR SOLUTION INTRAMUSCULAR; INTRAVENOUS
Status: DISCONTINUED | OUTPATIENT
Start: 2024-01-01 | End: 2024-01-01

## 2024-01-01 RX ORDER — NOREPINEPHRINE BITARTRATE 0.02 MG/ML
0-3 INJECTION, SOLUTION INTRAVENOUS CONTINUOUS
Status: DISCONTINUED | OUTPATIENT
Start: 2024-01-01 | End: 2024-01-01

## 2024-01-01 RX ORDER — ONDANSETRON 4 MG/1
4 TABLET, FILM COATED ORAL EVERY 8 HOURS PRN
Status: DISCONTINUED | OUTPATIENT
Start: 2024-01-01 | End: 2024-01-01

## 2024-01-01 RX ORDER — ONDANSETRON HYDROCHLORIDE 2 MG/ML
INJECTION, SOLUTION INTRAVENOUS
Status: DISPENSED
Start: 2024-01-01 | End: 2024-01-01

## 2024-01-01 RX ORDER — SODIUM CHLORIDE 0.9 % (FLUSH) 0.9 %
10 SYRINGE (ML) INJECTION EVERY 12 HOURS PRN
Status: DISCONTINUED | OUTPATIENT
Start: 2024-01-01 | End: 2024-01-01

## 2024-01-01 RX ORDER — MORPHINE SULFATE 2 MG/ML
4 INJECTION, SOLUTION INTRAMUSCULAR; INTRAVENOUS
Status: DISCONTINUED | OUTPATIENT
Start: 2024-01-01 | End: 2024-01-01

## 2024-01-01 RX ORDER — NOREPINEPHRINE BITARTRATE 0.02 MG/ML
INJECTION, SOLUTION INTRAVENOUS
Status: DISPENSED
Start: 2024-01-01 | End: 2024-01-01

## 2024-01-01 RX ORDER — CEFEPIME HYDROCHLORIDE 2 G/1
2 INJECTION, POWDER, FOR SOLUTION INTRAVENOUS ONCE
Status: DISCONTINUED | OUTPATIENT
Start: 2024-01-01 | End: 2024-01-01

## 2024-01-01 RX ORDER — INDOMETHACIN 25 MG/1
100 CAPSULE ORAL ONCE
Status: DISCONTINUED | OUTPATIENT
Start: 2024-01-01 | End: 2024-01-01

## 2024-01-01 RX ORDER — HYDROCHLOROTHIAZIDE 25 MG/1
25 TABLET ORAL DAILY
Status: DISCONTINUED | OUTPATIENT
Start: 2024-01-01 | End: 2024-01-01

## 2024-01-01 RX ORDER — PROMETHAZINE HYDROCHLORIDE 12.5 MG/1
12.5 TABLET ORAL EVERY 6 HOURS PRN
Status: DISCONTINUED | OUTPATIENT
Start: 2024-01-01 | End: 2024-11-28 | Stop reason: HOSPADM

## 2024-01-01 RX ORDER — INSULIN ASPART 100 [IU]/ML
0-5 INJECTION, SOLUTION INTRAVENOUS; SUBCUTANEOUS
Status: DISCONTINUED | OUTPATIENT
Start: 2024-01-01 | End: 2024-01-01

## 2024-01-01 RX ORDER — GLUCAGON 1 MG
1 KIT INJECTION
Status: DISCONTINUED | OUTPATIENT
Start: 2024-01-01 | End: 2024-01-01

## 2024-01-01 RX ORDER — LORAZEPAM 2 MG/ML
0.5 INJECTION INTRAMUSCULAR EVERY 30 MIN PRN
Status: DISCONTINUED | OUTPATIENT
Start: 2024-01-01 | End: 2024-01-01

## 2024-01-01 RX ORDER — NIFEDIPINE 30 MG/1
60 TABLET, EXTENDED RELEASE ORAL EVERY EVENING
Status: DISCONTINUED | OUTPATIENT
Start: 2024-01-01 | End: 2024-01-01

## 2024-01-01 RX ORDER — HYDROCODONE BITARTRATE AND ACETAMINOPHEN 500; 5 MG/1; MG/1
TABLET ORAL CONTINUOUS
Status: DISCONTINUED | OUTPATIENT
Start: 2024-01-01 | End: 2024-01-01

## 2024-01-01 RX ORDER — DEXTROSE MONOHYDRATE 50 MG/ML
INJECTION, SOLUTION INTRAVENOUS CONTINUOUS
Status: ACTIVE | OUTPATIENT
Start: 2024-01-01 | End: 2024-01-01

## 2024-01-01 RX ORDER — MORPHINE SULFATE 2 MG/ML
4 INJECTION, SOLUTION INTRAMUSCULAR; INTRAVENOUS
Status: DISCONTINUED | OUTPATIENT
Start: 2024-01-01 | End: 2024-11-28 | Stop reason: HOSPADM

## 2024-01-01 RX ORDER — ONDANSETRON HYDROCHLORIDE 2 MG/ML
INJECTION, SOLUTION INTRAVENOUS
Status: COMPLETED
Start: 2024-01-01 | End: 2024-01-01

## 2024-01-01 RX ORDER — GLYCOPYRROLATE 0.2 MG/ML
0.3 INJECTION INTRAMUSCULAR; INTRAVENOUS EVERY 4 HOURS PRN
Status: DISCONTINUED | OUTPATIENT
Start: 2024-01-01 | End: 2024-11-28 | Stop reason: HOSPADM

## 2024-01-01 RX ORDER — OXYCODONE HYDROCHLORIDE 5 MG/1
5 TABLET ORAL EVERY 6 HOURS PRN
Status: DISCONTINUED | OUTPATIENT
Start: 2024-01-01 | End: 2024-01-01

## 2024-01-01 RX ORDER — SODIUM CHLORIDE 9 MG/ML
INJECTION, SOLUTION INTRAVENOUS CONTINUOUS
Status: ACTIVE | OUTPATIENT
Start: 2024-01-01 | End: 2024-01-01

## 2024-01-01 RX ORDER — LORAZEPAM 2 MG/ML
0.5 INJECTION INTRAMUSCULAR EVERY 30 MIN PRN
Status: DISCONTINUED | OUTPATIENT
Start: 2024-01-01 | End: 2024-11-28 | Stop reason: HOSPADM

## 2024-01-01 RX ORDER — ONDANSETRON HYDROCHLORIDE 2 MG/ML
4 INJECTION, SOLUTION INTRAVENOUS EVERY 8 HOURS PRN
Status: DISCONTINUED | OUTPATIENT
Start: 2024-01-01 | End: 2024-01-01

## 2024-01-01 RX ORDER — HALOPERIDOL 5 MG/ML
1 INJECTION INTRAMUSCULAR EVERY 4 HOURS PRN
Status: DISCONTINUED | OUTPATIENT
Start: 2024-01-01 | End: 2024-11-28 | Stop reason: HOSPADM

## 2024-01-01 RX ORDER — MAGNESIUM SULFATE HEPTAHYDRATE 40 MG/ML
2 INJECTION, SOLUTION INTRAVENOUS
Status: DISCONTINUED | OUTPATIENT
Start: 2024-01-01 | End: 2024-01-01

## 2024-01-01 RX ORDER — IBUPROFEN 200 MG
16 TABLET ORAL
Status: DISCONTINUED | OUTPATIENT
Start: 2024-01-01 | End: 2024-01-01

## 2024-01-01 RX ORDER — ONDANSETRON HYDROCHLORIDE 2 MG/ML
4 INJECTION, SOLUTION INTRAVENOUS ONCE
Status: COMPLETED | OUTPATIENT
Start: 2024-01-01 | End: 2024-01-01

## 2024-01-01 RX ORDER — IBUPROFEN 200 MG
24 TABLET ORAL
Status: DISCONTINUED | OUTPATIENT
Start: 2024-01-01 | End: 2024-01-01

## 2024-01-01 RX ORDER — NALOXONE HCL 0.4 MG/ML
0.4 VIAL (ML) INJECTION
Status: DISCONTINUED | OUTPATIENT
Start: 2024-01-01 | End: 2024-01-01

## 2024-01-01 RX ORDER — DEXTROSE MONOHYDRATE AND SODIUM CHLORIDE 5; .45 G/100ML; G/100ML
INJECTION, SOLUTION INTRAVENOUS CONTINUOUS
Status: DISCONTINUED | OUTPATIENT
Start: 2024-01-01 | End: 2024-01-01

## 2024-01-01 RX ORDER — SODIUM CHLORIDE 9 MG/ML
INJECTION, SOLUTION INTRAVENOUS CONTINUOUS
Status: DISCONTINUED | OUTPATIENT
Start: 2024-01-01 | End: 2024-01-01

## 2024-01-01 RX ORDER — CYPROHEPTADINE HYDROCHLORIDE 4 MG/1
4 TABLET ORAL 3 TIMES DAILY
Status: DISCONTINUED | OUTPATIENT
Start: 2024-01-01 | End: 2024-01-01

## 2024-01-01 RX ORDER — CEFEPIME HYDROCHLORIDE 2 G/1
2 INJECTION, POWDER, FOR SOLUTION INTRAVENOUS
Status: DISCONTINUED | OUTPATIENT
Start: 2024-01-01 | End: 2024-01-01

## 2024-01-01 RX ORDER — NALOXONE HCL 0.4 MG/ML
0.02 VIAL (ML) INJECTION
Status: DISCONTINUED | OUTPATIENT
Start: 2024-01-01 | End: 2024-01-01 | Stop reason: SDUPTHER

## 2024-01-01 RX ORDER — DICYCLOMINE HYDROCHLORIDE 10 MG/1
10 CAPSULE ORAL
Status: DISCONTINUED | OUTPATIENT
Start: 2024-01-01 | End: 2024-01-01

## 2024-01-01 RX ORDER — CALCIUM GLUCONATE 94 MG/ML
1 INJECTION, SOLUTION INTRAVENOUS ONCE
Status: DISCONTINUED | OUTPATIENT
Start: 2024-01-01 | End: 2024-01-01

## 2024-01-01 RX ORDER — OLANZAPINE 5 MG/1
5 TABLET ORAL NIGHTLY
Status: DISCONTINUED | OUTPATIENT
Start: 2024-01-01 | End: 2024-01-01

## 2024-01-01 RX ORDER — HEPARIN SODIUM 5000 [USP'U]/ML
5000 INJECTION, SOLUTION INTRAVENOUS; SUBCUTANEOUS EVERY 8 HOURS
Status: DISCONTINUED | OUTPATIENT
Start: 2024-01-01 | End: 2024-01-01

## 2024-01-01 RX ORDER — LACTULOSE 10 G/15ML
30 SOLUTION ORAL 3 TIMES DAILY
Status: DISCONTINUED | OUTPATIENT
Start: 2024-01-01 | End: 2024-01-01

## 2024-01-01 RX ORDER — PROCHLORPERAZINE MALEATE 5 MG
5 TABLET ORAL 3 TIMES DAILY PRN
Status: DISCONTINUED | OUTPATIENT
Start: 2024-01-01 | End: 2024-01-01

## 2024-01-01 RX ORDER — ONDANSETRON 4 MG/1
8 TABLET, FILM COATED ORAL EVERY 8 HOURS PRN
Status: DISCONTINUED | OUTPATIENT
Start: 2024-01-01 | End: 2024-01-01

## 2024-01-01 RX ORDER — MORPHINE SULFATE 2 MG/ML
2 INJECTION, SOLUTION INTRAMUSCULAR; INTRAVENOUS EVERY 4 HOURS PRN
Status: DISCONTINUED | OUTPATIENT
Start: 2024-01-01 | End: 2024-01-01

## 2024-01-01 RX ORDER — POLYETHYLENE GLYCOL 3350 17 G/17G
17 POWDER, FOR SOLUTION ORAL DAILY
Status: DISCONTINUED | OUTPATIENT
Start: 2024-01-01 | End: 2024-01-01

## 2024-01-01 RX ORDER — ALBUTEROL SULFATE 2.5 MG/.5ML
7.5 SOLUTION RESPIRATORY (INHALATION) EVERY 4 HOURS PRN
Status: DISCONTINUED | OUTPATIENT
Start: 2024-01-01 | End: 2024-01-01

## 2024-01-01 RX ORDER — MORPHINE SULFATE 2 MG/ML
6 INJECTION, SOLUTION INTRAMUSCULAR; INTRAVENOUS
Status: COMPLETED | OUTPATIENT
Start: 2024-01-01 | End: 2024-01-01

## 2024-01-01 RX ORDER — ONDANSETRON HYDROCHLORIDE 2 MG/ML
4 INJECTION, SOLUTION INTRAVENOUS EVERY 6 HOURS PRN
Status: DISCONTINUED | OUTPATIENT
Start: 2024-01-01 | End: 2024-11-28 | Stop reason: HOSPADM

## 2024-01-01 RX ADMIN — HEPARIN SODIUM 5000 UNITS: 5000 INJECTION INTRAVENOUS; SUBCUTANEOUS at 09:11

## 2024-01-01 RX ADMIN — SODIUM CHLORIDE 1000 ML: 9 INJECTION, SOLUTION INTRAVENOUS at 02:11

## 2024-01-01 RX ADMIN — DEXTROSE MONOHYDRATE 250 ML: 100 INJECTION, SOLUTION INTRAVENOUS at 01:11

## 2024-01-01 RX ADMIN — ONDANSETRON HYDROCHLORIDE 8 MG: 4 TABLET, FILM COATED ORAL at 09:11

## 2024-01-01 RX ADMIN — CEFEPIME 1 G: 1 INJECTION, POWDER, FOR SOLUTION INTRAMUSCULAR; INTRAVENOUS at 01:11

## 2024-01-01 RX ADMIN — HEPARIN SODIUM 5000 UNITS: 5000 INJECTION INTRAVENOUS; SUBCUTANEOUS at 10:11

## 2024-01-01 RX ADMIN — ONDANSETRON HYDROCHLORIDE 4 MG: 2 INJECTION, SOLUTION INTRAVENOUS at 04:11

## 2024-01-01 RX ADMIN — PROMETHAZINE HYDROCHLORIDE 12.5 MG: 25 INJECTION INTRAMUSCULAR; INTRAVENOUS at 01:11

## 2024-01-01 RX ADMIN — HYDROCHLOROTHIAZIDE 25 MG: 25 TABLET ORAL at 09:11

## 2024-01-01 RX ADMIN — IOHEXOL 100 ML: 350 INJECTION, SOLUTION INTRAVENOUS at 02:11

## 2024-01-01 RX ADMIN — SODIUM CHLORIDE: 9 INJECTION, SOLUTION INTRAVENOUS at 06:11

## 2024-01-01 RX ADMIN — DEXTROSE MONOHYDRATE: 50 INJECTION, SOLUTION INTRAVENOUS at 11:11

## 2024-01-01 RX ADMIN — MORPHINE SULFATE 6 MG: 2 INJECTION, SOLUTION INTRAMUSCULAR; INTRAVENOUS at 12:11

## 2024-01-01 RX ADMIN — Medication 0.02 MCG/KG/MIN: at 07:11

## 2024-01-01 RX ADMIN — HEPARIN SODIUM 5000 UNITS: 5000 INJECTION INTRAVENOUS; SUBCUTANEOUS at 02:11

## 2024-01-01 RX ADMIN — CEFEPIME 2 G: 2 INJECTION, POWDER, FOR SOLUTION INTRAVENOUS at 01:11

## 2024-01-01 RX ADMIN — HEPARIN SODIUM 5000 UNITS: 5000 INJECTION INTRAVENOUS; SUBCUTANEOUS at 05:11

## 2024-01-01 RX ADMIN — OXYCODONE 5 MG: 5 TABLET ORAL at 10:11

## 2024-01-01 RX ADMIN — CYPROHEPTADINE HYDROCHLORIDE 4 MG: 4 TABLET ORAL at 09:11

## 2024-01-01 RX ADMIN — MORPHINE SULFATE 4 MG: 2 INJECTION, SOLUTION INTRAMUSCULAR; INTRAVENOUS at 06:11

## 2024-01-01 RX ADMIN — ONDANSETRON 4 MG: 2 INJECTION INTRAMUSCULAR; INTRAVENOUS at 02:11

## 2024-01-01 RX ADMIN — SODIUM CHLORIDE 1000 ML: 0.9 INJECTION, SOLUTION INTRAVENOUS at 12:11

## 2024-01-01 RX ADMIN — OLANZAPINE 5 MG: 5 TABLET, FILM COATED ORAL at 09:11

## 2024-01-01 RX ADMIN — ONDANSETRON 4 MG: 2 INJECTION INTRAMUSCULAR; INTRAVENOUS at 04:11

## 2024-01-01 RX ADMIN — HEPARIN SODIUM 5000 UNITS: 5000 INJECTION INTRAVENOUS; SUBCUTANEOUS at 06:11

## 2024-01-01 RX ADMIN — MORPHINE SULFATE 4 MG: 2 INJECTION, SOLUTION INTRAMUSCULAR; INTRAVENOUS at 11:11

## 2024-01-01 RX ADMIN — DEXTROSE MONOHYDRATE 125 ML: 100 INJECTION, SOLUTION INTRAVENOUS at 09:11

## 2024-01-01 RX ADMIN — PROMETHAZINE HYDROCHLORIDE 12.5 MG: 12.5 TABLET ORAL at 06:11

## 2024-01-01 RX ADMIN — Medication 16 G: at 08:11

## 2024-01-01 RX ADMIN — MORPHINE SULFATE 4 MG: 2 INJECTION, SOLUTION INTRAMUSCULAR; INTRAVENOUS at 08:11

## 2024-01-01 RX ADMIN — LORAZEPAM 0.5 MG: 2 INJECTION INTRAMUSCULAR; INTRAVENOUS at 08:11

## 2024-02-26 ENCOUNTER — TELEPHONE (OUTPATIENT)
Dept: RADIOLOGY | Facility: HOSPITAL | Age: 69
End: 2024-02-26
Payer: COMMERCIAL

## 2024-02-26 NOTE — TELEPHONE ENCOUNTER
----- Message from Calisat Mir MA sent at 2/26/2024 11:46 AM CST -----  Can we get an MMO please for this Pt as well please      Thanks

## 2024-03-25 ENCOUNTER — HOSPITAL ENCOUNTER (OUTPATIENT)
Dept: RADIOLOGY | Facility: HOSPITAL | Age: 69
Discharge: HOME | End: 2024-03-25
Attending: PHYSICIAN ASSISTANT
Payer: COMMERCIAL

## 2024-03-25 DIAGNOSIS — Z85.3 HISTORY OF BREAST CANCER: ICD-10-CM

## 2024-03-25 PROCEDURE — 77062 BREAST TOMOSYNTHESIS BI: CPT | Mod: 26,,, | Performed by: RADIOLOGY

## 2024-03-25 PROCEDURE — 77066 DX MAMMO INCL CAD BI: CPT | Mod: 26,,, | Performed by: RADIOLOGY

## 2024-03-25 PROCEDURE — 77062 BREAST TOMOSYNTHESIS BI: CPT | Mod: TC

## 2024-04-02 ENCOUNTER — OFFICE VISIT (OUTPATIENT)
Dept: HEMATOLOGY/ONCOLOGY | Facility: CLINIC | Age: 69
End: 2024-04-02
Payer: COMMERCIAL

## 2024-04-02 VITALS
BODY MASS INDEX: 26.99 KG/M2 | SYSTOLIC BLOOD PRESSURE: 142 MMHG | TEMPERATURE: 98 F | WEIGHT: 142.88 LBS | DIASTOLIC BLOOD PRESSURE: 74 MMHG | OXYGEN SATURATION: 98 % | HEART RATE: 77 BPM

## 2024-04-02 DIAGNOSIS — Z85.3 HISTORY OF BREAST CANCER: Primary | ICD-10-CM

## 2024-04-02 PROCEDURE — 99999 PR PBB SHADOW E&M-EST. PATIENT-LVL III: CPT | Mod: PBBFAC,,, | Performed by: PHYSICIAN ASSISTANT

## 2024-04-02 PROCEDURE — 1126F AMNT PAIN NOTED NONE PRSNT: CPT | Mod: CPTII,S$GLB,, | Performed by: PHYSICIAN ASSISTANT

## 2024-04-02 PROCEDURE — 3288F FALL RISK ASSESSMENT DOCD: CPT | Mod: CPTII,S$GLB,, | Performed by: PHYSICIAN ASSISTANT

## 2024-04-02 PROCEDURE — 3008F BODY MASS INDEX DOCD: CPT | Mod: CPTII,S$GLB,, | Performed by: PHYSICIAN ASSISTANT

## 2024-04-02 PROCEDURE — 3077F SYST BP >= 140 MM HG: CPT | Mod: CPTII,S$GLB,, | Performed by: PHYSICIAN ASSISTANT

## 2024-04-02 PROCEDURE — 1101F PT FALLS ASSESS-DOCD LE1/YR: CPT | Mod: CPTII,S$GLB,, | Performed by: PHYSICIAN ASSISTANT

## 2024-04-02 PROCEDURE — 99213 OFFICE O/P EST LOW 20 MIN: CPT | Mod: S$GLB,,, | Performed by: PHYSICIAN ASSISTANT

## 2024-04-02 PROCEDURE — 3078F DIAST BP <80 MM HG: CPT | Mod: CPTII,S$GLB,, | Performed by: PHYSICIAN ASSISTANT

## 2024-04-02 NOTE — PROGRESS NOTES
Subjective     Patient ID: Tosin Ma is a 68 y.o. female.    Chief Complaint: No chief complaint on file.    Mrs. Ma is a 68- year-old  female with a remote history of right breast cancer   treated in 1996 with lumpectomy, followed by radiation therapy. She did not receive chemotherapy or hormonal treatment.       Overall feeling well. No shortness of breath, headaches or back pain.  No breast pain.   She continues to work remotely [employer], at work on Tues and Thursday.     She notes knot at left forearm X 3 days, appeared spontaneously. Does not recall any associated trauma.  It was tender initially but now only some discomfort with deep palpation.      She underwent biopsy of left breast abnormality on 10/6/17, that pathology was benign.  .          Mammogram from 3/25/2024 was unremarkable.       Review of Systems   Constitutional: Negative.    HENT:  Negative for nasal congestion, rhinorrhea, sore throat and trouble swallowing.    Eyes:  Negative for visual disturbance.   Respiratory:  Negative for cough, chest tightness and shortness of breath.    Cardiovascular:  Negative for chest pain, palpitations and leg swelling.   Gastrointestinal:  Negative for abdominal pain, blood in stool, constipation, diarrhea, nausea and vomiting.   Genitourinary:  Negative for dysuria, hematuria and vaginal bleeding.   Musculoskeletal:  Negative for arthralgias, back pain and myalgias.   Integumentary:  Negative for pallor and rash.   Neurological:  Negative for dizziness, weakness and headaches.   Hematological:  Negative for adenopathy. Does not bruise/bleed easily.   Psychiatric/Behavioral:  Negative for dysphoric mood and suicidal ideas. The patient is not nervous/anxious.           Objective     Physical Exam  Vitals reviewed.   Constitutional:       General: She is not in acute distress.     Appearance: She is well-developed.      Comments: Presents alone     HENT:      Head: Normocephalic.       Mouth/Throat:      Pharynx: No oropharyngeal exudate.   Eyes:      General: No scleral icterus.     Conjunctiva/sclera: Conjunctivae normal.   Neck:      Thyroid: No thyromegaly.   Cardiovascular:      Rate and Rhythm: Normal rate and regular rhythm.      Heart sounds: Normal heart sounds.   Pulmonary:      Effort: Pulmonary effort is normal. No respiratory distress.      Breath sounds: Normal breath sounds.      Comments: Right breast with 1 cm mobile nodularity at 10 o'clock, right at areolar border.  This is in area of prior lumpectomy and has not changed from her last physical exam. Left breast without mass, nodule or skin changes. No axillary or supraclavicular adenopathy.  Abdominal:      General: Bowel sounds are normal. There is no distension.      Palpations: Abdomen is soft. There is no mass.      Tenderness: There is no abdominal tenderness.   Musculoskeletal:      Cervical back: Normal range of motion and neck supple.      Comments: No spinal or paraspinal tenderness to palpation     Lymphadenopathy:      Cervical: No cervical adenopathy.   Skin:     General: Skin is warm and dry.      Coloration: Skin is not pale.      Findings: No erythema or rash.   Neurological:      Mental Status: She is alert and oriented to person, place, and time.      Cranial Nerves: No cranial nerve deficit.   Psychiatric:         Behavior: Behavior normal.         Thought Content: Thought content normal.         Judgment: Judgment normal.            Assessment and Plan     1. History of breast cancer      Patient is clinically without evidence of disease. RTC in one year with mammogram.          No follow-ups on file.

## 2024-10-22 ENCOUNTER — PATIENT MESSAGE (OUTPATIENT)
Dept: HEMATOLOGY/ONCOLOGY | Facility: CLINIC | Age: 69
End: 2024-10-22
Payer: COMMERCIAL

## 2024-10-24 ENCOUNTER — PATIENT MESSAGE (OUTPATIENT)
Dept: HEMATOLOGY/ONCOLOGY | Facility: CLINIC | Age: 69
End: 2024-10-24
Payer: COMMERCIAL

## 2024-10-24 ENCOUNTER — TELEPHONE (OUTPATIENT)
Dept: INTERNAL MEDICINE | Facility: CLINIC | Age: 69
End: 2024-10-24
Payer: COMMERCIAL

## 2024-10-24 DIAGNOSIS — Z00.00 HEALTHCARE MAINTENANCE: Primary | ICD-10-CM

## 2024-10-24 NOTE — TELEPHONE ENCOUNTER
Spoke to patient to schedule new patient appointment here at [facility].    Appointment scheduled   11/04/2024 at 11:20 am with Dr. Cunningham.

## 2024-10-31 DIAGNOSIS — K62.5 RECTAL BLEEDING: Primary | ICD-10-CM

## 2024-11-04 ENCOUNTER — OFFICE VISIT (OUTPATIENT)
Dept: INTERNAL MEDICINE | Facility: CLINIC | Age: 69
End: 2024-11-04
Payer: COMMERCIAL

## 2024-11-04 ENCOUNTER — HOSPITAL ENCOUNTER (OUTPATIENT)
Facility: OTHER | Age: 69
Discharge: HOME | End: 2024-11-06
Attending: EMERGENCY MEDICINE | Admitting: EMERGENCY MEDICINE
Payer: COMMERCIAL

## 2024-11-04 ENCOUNTER — LAB VISIT (OUTPATIENT)
Dept: LAB | Facility: OTHER | Age: 69
End: 2024-11-04
Payer: COMMERCIAL

## 2024-11-04 VITALS
HEART RATE: 110 BPM | OXYGEN SATURATION: 97 % | BODY MASS INDEX: 25.14 KG/M2 | DIASTOLIC BLOOD PRESSURE: 74 MMHG | WEIGHT: 133.19 LBS | SYSTOLIC BLOOD PRESSURE: 122 MMHG | HEIGHT: 61 IN

## 2024-11-04 DIAGNOSIS — Z00.00 ANNUAL PHYSICAL EXAM: ICD-10-CM

## 2024-11-04 DIAGNOSIS — R10.13 EPIGASTRIC PAIN: ICD-10-CM

## 2024-11-04 DIAGNOSIS — R07.9 CHEST PAIN: ICD-10-CM

## 2024-11-04 DIAGNOSIS — Z00.00 HEALTHCARE MAINTENANCE: ICD-10-CM

## 2024-11-04 DIAGNOSIS — R79.89 ELEVATED LFTS: ICD-10-CM

## 2024-11-04 DIAGNOSIS — K76.9 LIVER LESION: ICD-10-CM

## 2024-11-04 DIAGNOSIS — R00.0 TACHYCARDIA: ICD-10-CM

## 2024-11-04 DIAGNOSIS — E11.65 TYPE 2 DIABETES MELLITUS WITH HYPERGLYCEMIA, WITHOUT LONG-TERM CURRENT USE OF INSULIN: ICD-10-CM

## 2024-11-04 DIAGNOSIS — C79.9 METASTATIC MALIGNANT NEOPLASM, UNSPECIFIED SITE: Primary | ICD-10-CM

## 2024-11-04 DIAGNOSIS — E87.6 HYPOKALEMIA: ICD-10-CM

## 2024-11-04 DIAGNOSIS — R17 SERUM TOTAL BILIRUBIN ELEVATED: ICD-10-CM

## 2024-11-04 DIAGNOSIS — Z00.00 ANNUAL PHYSICAL EXAM: Primary | ICD-10-CM

## 2024-11-04 DIAGNOSIS — R10.9 ABDOMINAL PAIN, UNSPECIFIED ABDOMINAL LOCATION: ICD-10-CM

## 2024-11-04 DIAGNOSIS — R16.0 LIVER MASS: ICD-10-CM

## 2024-11-04 PROBLEM — I10 ESSENTIAL HYPERTENSION: Status: ACTIVE | Noted: 2020-09-03

## 2024-11-04 PROBLEM — E78.5 HLD (HYPERLIPIDEMIA): Status: ACTIVE | Noted: 2020-09-03

## 2024-11-04 LAB
ALBUMIN SERPL BCP-MCNC: 2.3 G/DL (ref 3.5–5.2)
ALBUMIN SERPL BCP-MCNC: 2.5 G/DL (ref 3.5–5.2)
ALP SERPL-CCNC: 830 U/L (ref 40–150)
ALP SERPL-CCNC: 858 U/L (ref 40–150)
ALT SERPL W/O P-5'-P-CCNC: 78 U/L (ref 10–44)
ALT SERPL W/O P-5'-P-CCNC: 80 U/L (ref 10–44)
ANION GAP SERPL CALC-SCNC: 24 MMOL/L (ref 8–16)
ANION GAP SERPL CALC-SCNC: 25 MMOL/L (ref 8–16)
APTT PPP: 36.3 SEC (ref 21–32)
AST SERPL-CCNC: 159 U/L (ref 10–40)
AST SERPL-CCNC: 159 U/L (ref 10–40)
BASOPHILS # BLD AUTO: 0.05 K/UL (ref 0–0.2)
BASOPHILS # BLD AUTO: 0.05 K/UL (ref 0–0.2)
BASOPHILS NFR BLD: 0.5 % (ref 0–1.9)
BASOPHILS NFR BLD: 0.5 % (ref 0–1.9)
BILIRUB SERPL-MCNC: 6.4 MG/DL (ref 0.1–1)
BILIRUB SERPL-MCNC: 6.7 MG/DL (ref 0.1–1)
BILIRUB UR QL STRIP: NEGATIVE
BUN SERPL-MCNC: 27 MG/DL (ref 8–23)
BUN SERPL-MCNC: 27 MG/DL (ref 8–23)
CALCIUM SERPL-MCNC: 9.9 MG/DL (ref 8.7–10.5)
CALCIUM SERPL-MCNC: 9.9 MG/DL (ref 8.7–10.5)
CHLORIDE SERPL-SCNC: 92 MMOL/L (ref 95–110)
CHLORIDE SERPL-SCNC: 93 MMOL/L (ref 95–110)
CHOLEST SERPL-MCNC: 159 MG/DL (ref 120–199)
CHOLEST/HDLC SERPL: 15.9 {RATIO} (ref 2–5)
CLARITY UR: CLEAR
CO2 SERPL-SCNC: 20 MMOL/L (ref 23–29)
CO2 SERPL-SCNC: 21 MMOL/L (ref 23–29)
COLOR UR: YELLOW
CREAT SERPL-MCNC: 0.9 MG/DL (ref 0.5–1.4)
CREAT SERPL-MCNC: 1 MG/DL (ref 0.5–1.4)
CREAT SERPL-MCNC: 1 MG/DL (ref 0.5–1.4)
DIFFERENTIAL METHOD BLD: ABNORMAL
DIFFERENTIAL METHOD BLD: ABNORMAL
EOSINOPHIL # BLD AUTO: 0.1 K/UL (ref 0–0.5)
EOSINOPHIL # BLD AUTO: 0.1 K/UL (ref 0–0.5)
EOSINOPHIL NFR BLD: 0.6 % (ref 0–8)
EOSINOPHIL NFR BLD: 0.6 % (ref 0–8)
ERYTHROCYTE [DISTWIDTH] IN BLOOD BY AUTOMATED COUNT: 17 % (ref 11.5–14.5)
ERYTHROCYTE [DISTWIDTH] IN BLOOD BY AUTOMATED COUNT: 17.1 % (ref 11.5–14.5)
EST. GFR  (NO RACE VARIABLE): >60 ML/MIN/1.73 M^2
EST. GFR  (NO RACE VARIABLE): >60 ML/MIN/1.73 M^2
ESTIMATED AVG GLUCOSE: 91 MG/DL (ref 68–131)
GLUCOSE SERPL-MCNC: 101 MG/DL (ref 70–110)
GLUCOSE SERPL-MCNC: 73 MG/DL (ref 70–110)
GLUCOSE UR QL STRIP: NEGATIVE
HBA1C MFR BLD: 4.8 % (ref 4–5.6)
HCT VFR BLD AUTO: 28.2 % (ref 37–48.5)
HCT VFR BLD AUTO: 29.4 % (ref 37–48.5)
HCV AB SERPL QL IA: NEGATIVE
HDLC SERPL-MCNC: 10 MG/DL (ref 40–75)
HDLC SERPL: 6.3 % (ref 20–50)
HGB BLD-MCNC: 10.3 G/DL (ref 12–16)
HGB BLD-MCNC: 10.5 G/DL (ref 12–16)
HGB UR QL STRIP: ABNORMAL
HIV 1+2 AB+HIV1 P24 AG SERPL QL IA: NEGATIVE
IMM GRANULOCYTES # BLD AUTO: 0.03 K/UL (ref 0–0.04)
IMM GRANULOCYTES # BLD AUTO: 0.05 K/UL (ref 0–0.04)
IMM GRANULOCYTES NFR BLD AUTO: 0.3 % (ref 0–0.5)
IMM GRANULOCYTES NFR BLD AUTO: 0.5 % (ref 0–0.5)
INR PPP: 1.3 (ref 0.8–1.2)
KETONES UR QL STRIP: ABNORMAL
LDLC SERPL CALC-MCNC: 114.6 MG/DL (ref 63–159)
LEUKOCYTE ESTERASE UR QL STRIP: NEGATIVE
LIPASE SERPL-CCNC: 37 U/L (ref 4–60)
LYMPHOCYTES # BLD AUTO: 0.9 K/UL (ref 1–4.8)
LYMPHOCYTES # BLD AUTO: 1 K/UL (ref 1–4.8)
LYMPHOCYTES NFR BLD: 8.9 % (ref 18–48)
LYMPHOCYTES NFR BLD: 9.2 % (ref 18–48)
MCH RBC QN AUTO: 27.6 PG (ref 27–31)
MCH RBC QN AUTO: 27.9 PG (ref 27–31)
MCHC RBC AUTO-ENTMCNC: 35.7 G/DL (ref 32–36)
MCHC RBC AUTO-ENTMCNC: 36.5 G/DL (ref 32–36)
MCV RBC AUTO: 76 FL (ref 82–98)
MCV RBC AUTO: 77 FL (ref 82–98)
MONOCYTES # BLD AUTO: 0.8 K/UL (ref 0.3–1)
MONOCYTES # BLD AUTO: 1 K/UL (ref 0.3–1)
MONOCYTES NFR BLD: 8 % (ref 4–15)
MONOCYTES NFR BLD: 8.9 % (ref 4–15)
NEUTROPHILS # BLD AUTO: 8.6 K/UL (ref 1.8–7.7)
NEUTROPHILS # BLD AUTO: 8.6 K/UL (ref 1.8–7.7)
NEUTROPHILS NFR BLD: 80.8 % (ref 38–73)
NEUTROPHILS NFR BLD: 81.2 % (ref 38–73)
NITRITE UR QL STRIP: NEGATIVE
NONHDLC SERPL-MCNC: 149 MG/DL
NRBC BLD-RTO: 0 /100 WBC
NRBC BLD-RTO: 0 /100 WBC
PH UR STRIP: 6 [PH] (ref 5–8)
PLATELET # BLD AUTO: 629 K/UL (ref 150–450)
PLATELET # BLD AUTO: 652 K/UL (ref 150–450)
PMV BLD AUTO: 9.7 FL (ref 9.2–12.9)
PMV BLD AUTO: 9.9 FL (ref 9.2–12.9)
POCT GLUCOSE: 70 MG/DL (ref 70–110)
POCT GLUCOSE: 87 MG/DL (ref 70–110)
POTASSIUM SERPL-SCNC: 2.8 MMOL/L (ref 3.5–5.1)
POTASSIUM SERPL-SCNC: 3 MMOL/L (ref 3.5–5.1)
PROT SERPL-MCNC: 7.5 G/DL (ref 6–8.4)
PROT SERPL-MCNC: 7.7 G/DL (ref 6–8.4)
PROT UR QL STRIP: ABNORMAL
PROTHROMBIN TIME: 13.6 SEC (ref 9–12.5)
RBC # BLD AUTO: 3.69 M/UL (ref 4–5.4)
RBC # BLD AUTO: 3.8 M/UL (ref 4–5.4)
SAMPLE: NORMAL
SODIUM SERPL-SCNC: 136 MMOL/L (ref 136–145)
SODIUM SERPL-SCNC: 139 MMOL/L (ref 136–145)
SP GR UR STRIP: >1.03 (ref 1–1.03)
TRIGL SERPL-MCNC: 172 MG/DL (ref 30–150)
URN SPEC COLLECT METH UR: ABNORMAL
UROBILINOGEN UR STRIP-ACNC: NEGATIVE EU/DL
WBC # BLD AUTO: 10.54 K/UL (ref 3.9–12.7)
WBC # BLD AUTO: 10.62 K/UL (ref 3.9–12.7)

## 2024-11-04 PROCEDURE — 80053 COMPREHEN METABOLIC PANEL: CPT | Performed by: EMERGENCY MEDICINE

## 2024-11-04 PROCEDURE — 85730 THROMBOPLASTIN TIME PARTIAL: CPT | Performed by: EMERGENCY MEDICINE

## 2024-11-04 PROCEDURE — 3078F DIAST BP <80 MM HG: CPT | Mod: CPTII,S$GLB,,

## 2024-11-04 PROCEDURE — 82565 ASSAY OF CREATININE: CPT

## 2024-11-04 PROCEDURE — G0378 HOSPITAL OBSERVATION PER HR: HCPCS

## 2024-11-04 PROCEDURE — 3074F SYST BP LT 130 MM HG: CPT | Mod: CPTII,S$GLB,,

## 2024-11-04 PROCEDURE — 85025 COMPLETE CBC W/AUTO DIFF WBC: CPT | Mod: 91

## 2024-11-04 PROCEDURE — 25500020 PHARM REV CODE 255: Performed by: EMERGENCY MEDICINE

## 2024-11-04 PROCEDURE — 82962 GLUCOSE BLOOD TEST: CPT

## 2024-11-04 PROCEDURE — 85610 PROTHROMBIN TIME: CPT | Performed by: EMERGENCY MEDICINE

## 2024-11-04 PROCEDURE — 96365 THER/PROPH/DIAG IV INF INIT: CPT

## 2024-11-04 PROCEDURE — 83036 HEMOGLOBIN GLYCOSYLATED A1C: CPT

## 2024-11-04 PROCEDURE — 83690 ASSAY OF LIPASE: CPT | Performed by: EMERGENCY MEDICINE

## 2024-11-04 PROCEDURE — 80053 COMPREHEN METABOLIC PANEL: CPT | Mod: 91

## 2024-11-04 PROCEDURE — 1101F PT FALLS ASSESS-DOCD LE1/YR: CPT | Mod: CPTII,S$GLB,,

## 2024-11-04 PROCEDURE — 86803 HEPATITIS C AB TEST: CPT

## 2024-11-04 PROCEDURE — 99285 EMERGENCY DEPT VISIT HI MDM: CPT | Mod: 25

## 2024-11-04 PROCEDURE — 63600175 PHARM REV CODE 636 W HCPCS: Performed by: EMERGENCY MEDICINE

## 2024-11-04 PROCEDURE — 96376 TX/PRO/DX INJ SAME DRUG ADON: CPT

## 2024-11-04 PROCEDURE — 1125F AMNT PAIN NOTED PAIN PRSNT: CPT | Mod: CPTII,S$GLB,,

## 2024-11-04 PROCEDURE — 87389 HIV-1 AG W/HIV-1&-2 AB AG IA: CPT

## 2024-11-04 PROCEDURE — 99900035 HC TECH TIME PER 15 MIN (STAT)

## 2024-11-04 PROCEDURE — 99204 OFFICE O/P NEW MOD 45 MIN: CPT | Mod: S$GLB,,,

## 2024-11-04 PROCEDURE — 80061 LIPID PANEL: CPT

## 2024-11-04 PROCEDURE — 25000003 PHARM REV CODE 250: Performed by: NURSE PRACTITIONER

## 2024-11-04 PROCEDURE — 63600175 PHARM REV CODE 636 W HCPCS: Performed by: NURSE PRACTITIONER

## 2024-11-04 PROCEDURE — 99999 PR PBB SHADOW E&M-EST. PATIENT-LVL IV: CPT | Mod: PBBFAC,,,

## 2024-11-04 PROCEDURE — 85025 COMPLETE CBC W/AUTO DIFF WBC: CPT | Performed by: EMERGENCY MEDICINE

## 2024-11-04 PROCEDURE — 96375 TX/PRO/DX INJ NEW DRUG ADDON: CPT

## 2024-11-04 PROCEDURE — 1160F RVW MEDS BY RX/DR IN RCRD: CPT | Mod: CPTII,S$GLB,,

## 2024-11-04 PROCEDURE — 1159F MED LIST DOCD IN RCRD: CPT | Mod: CPTII,S$GLB,,

## 2024-11-04 PROCEDURE — 36415 COLL VENOUS BLD VENIPUNCTURE: CPT

## 2024-11-04 PROCEDURE — 3288F FALL RISK ASSESSMENT DOCD: CPT | Mod: CPTII,S$GLB,,

## 2024-11-04 PROCEDURE — 3008F BODY MASS INDEX DOCD: CPT | Mod: CPTII,S$GLB,,

## 2024-11-04 PROCEDURE — 96361 HYDRATE IV INFUSION ADD-ON: CPT

## 2024-11-04 PROCEDURE — 81003 URINALYSIS AUTO W/O SCOPE: CPT | Performed by: EMERGENCY MEDICINE

## 2024-11-04 RX ORDER — IPRATROPIUM BROMIDE AND ALBUTEROL SULFATE 2.5; .5 MG/3ML; MG/3ML
3 SOLUTION RESPIRATORY (INHALATION) EVERY 6 HOURS PRN
Status: DISCONTINUED | OUTPATIENT
Start: 2024-11-04 | End: 2024-11-06 | Stop reason: HOSPADM

## 2024-11-04 RX ORDER — POTASSIUM CHLORIDE 20 MEQ/1
40 TABLET, EXTENDED RELEASE ORAL
Status: COMPLETED | OUTPATIENT
Start: 2024-11-04 | End: 2024-11-05

## 2024-11-04 RX ORDER — POTASSIUM CHLORIDE 7.45 MG/ML
10 INJECTION INTRAVENOUS
Status: COMPLETED | OUTPATIENT
Start: 2024-11-04 | End: 2024-11-04

## 2024-11-04 RX ORDER — POTASSIUM CHLORIDE 7.45 MG/ML
10 INJECTION INTRAVENOUS
Status: COMPLETED | OUTPATIENT
Start: 2024-11-04 | End: 2024-11-05

## 2024-11-04 RX ORDER — ATORVASTATIN CALCIUM 20 MG/1
1 TABLET, FILM COATED ORAL DAILY
Status: ON HOLD | COMMUNITY
Start: 2024-06-07 | End: 2024-11-06 | Stop reason: HOSPADM

## 2024-11-04 RX ORDER — ACETAMINOPHEN 325 MG/1
650 TABLET ORAL EVERY 8 HOURS PRN
Status: DISCONTINUED | OUTPATIENT
Start: 2024-11-04 | End: 2024-11-06 | Stop reason: HOSPADM

## 2024-11-04 RX ORDER — HYDROCHLOROTHIAZIDE 25 MG/1
25 TABLET ORAL DAILY
Status: DISCONTINUED | OUTPATIENT
Start: 2024-11-05 | End: 2024-11-06 | Stop reason: HOSPADM

## 2024-11-04 RX ORDER — DICYCLOMINE HYDROCHLORIDE 10 MG/1
10 CAPSULE ORAL
Qty: 40 CAPSULE | Refills: 0 | Status: SHIPPED | OUTPATIENT
Start: 2024-11-04 | End: 2024-11-17

## 2024-11-04 RX ORDER — AMOXICILLIN 250 MG
1 CAPSULE ORAL 2 TIMES DAILY
Status: DISCONTINUED | OUTPATIENT
Start: 2024-11-04 | End: 2024-11-06 | Stop reason: HOSPADM

## 2024-11-04 RX ORDER — SODIUM CHLORIDE 9 MG/ML
INJECTION, SOLUTION INTRAVENOUS ONCE
Status: COMPLETED | OUTPATIENT
Start: 2024-11-04 | End: 2024-11-05

## 2024-11-04 RX ORDER — MORPHINE SULFATE 2 MG/ML
2 INJECTION, SOLUTION INTRAMUSCULAR; INTRAVENOUS EVERY 6 HOURS PRN
Status: DISCONTINUED | OUTPATIENT
Start: 2024-11-04 | End: 2024-11-06 | Stop reason: HOSPADM

## 2024-11-04 RX ORDER — MORPHINE SULFATE 2 MG/ML
2 INJECTION, SOLUTION INTRAMUSCULAR; INTRAVENOUS ONCE
Status: COMPLETED | OUTPATIENT
Start: 2024-11-04 | End: 2024-11-04

## 2024-11-04 RX ORDER — NALOXONE HCL 0.4 MG/ML
0.02 VIAL (ML) INJECTION
Status: DISCONTINUED | OUTPATIENT
Start: 2024-11-04 | End: 2024-11-06 | Stop reason: HOSPADM

## 2024-11-04 RX ORDER — METFORMIN HYDROCHLORIDE 500 MG/1
500 TABLET, EXTENDED RELEASE ORAL
Qty: 90 TABLET | Refills: 3 | Status: ON HOLD | OUTPATIENT
Start: 2024-11-04 | End: 2024-11-06 | Stop reason: HOSPADM

## 2024-11-04 RX ORDER — TALC
6 POWDER (GRAM) TOPICAL NIGHTLY PRN
Status: DISCONTINUED | OUTPATIENT
Start: 2024-11-04 | End: 2024-11-06 | Stop reason: HOSPADM

## 2024-11-04 RX ORDER — ONDANSETRON HYDROCHLORIDE 2 MG/ML
4 INJECTION, SOLUTION INTRAVENOUS EVERY 6 HOURS PRN
Status: DISCONTINUED | OUTPATIENT
Start: 2024-11-04 | End: 2024-11-06 | Stop reason: HOSPADM

## 2024-11-04 RX ORDER — SODIUM CHLORIDE 0.9 % (FLUSH) 0.9 %
10 SYRINGE (ML) INJECTION EVERY 8 HOURS PRN
Status: DISCONTINUED | OUTPATIENT
Start: 2024-11-04 | End: 2024-11-06 | Stop reason: HOSPADM

## 2024-11-04 RX ORDER — NIFEDIPINE 30 MG/1
60 TABLET, EXTENDED RELEASE ORAL DAILY
Status: DISCONTINUED | OUTPATIENT
Start: 2024-11-05 | End: 2024-11-06 | Stop reason: HOSPADM

## 2024-11-04 RX ORDER — PROCHLORPERAZINE EDISYLATE 5 MG/ML
5 INJECTION INTRAMUSCULAR; INTRAVENOUS EVERY 6 HOURS PRN
Status: DISCONTINUED | OUTPATIENT
Start: 2024-11-04 | End: 2024-11-06 | Stop reason: HOSPADM

## 2024-11-04 RX ADMIN — POTASSIUM CHLORIDE 40 MEQ: 1500 TABLET, EXTENDED RELEASE ORAL at 09:11

## 2024-11-04 RX ADMIN — MORPHINE SULFATE 2 MG: 2 INJECTION, SOLUTION INTRAMUSCULAR; INTRAVENOUS at 09:11

## 2024-11-04 RX ADMIN — IOHEXOL 75 ML: 350 INJECTION, SOLUTION INTRAVENOUS at 05:11

## 2024-11-04 RX ADMIN — SODIUM CHLORIDE: 9 INJECTION, SOLUTION INTRAVENOUS at 10:11

## 2024-11-04 RX ADMIN — POTASSIUM CHLORIDE 10 MEQ: 7.46 INJECTION, SOLUTION INTRAVENOUS at 10:11

## 2024-11-04 RX ADMIN — POTASSIUM CHLORIDE 10 MEQ: 7.46 INJECTION, SOLUTION INTRAVENOUS at 08:11

## 2024-11-04 RX ADMIN — POTASSIUM CHLORIDE 10 MEQ: 10 INJECTION, SOLUTION INTRAVENOUS at 04:11

## 2024-11-04 NOTE — ED PROVIDER NOTES
Encounter Date: 2024       History     Chief Complaint   Patient presents with    Abdominal Pain     Pt reporting RUQ pain x 1 month. Sent from PCP to rule out gallstones. Denies N/V/D. Pt reports noticing bright and dark red blood when having bowel movement yesterday.     abnormal labs     Pt sent to ED for elevated liver enzymes. Eyes appearing yellow upon assessment.      69-year-old female past medical history of diabetes hypertension hyperlipidemia sent in from her PCP for elevated LFTs.  Patient reports about 1 month of abdominal pain and bloating.  She states it is typically triggered by eating or drinking.  She describes the has mostly right upper quadrant that radiates to her back. She does report frequent bowel movements following the foods but no diarrhea constipation.  Denies any chest pain or shortness breath.  Denies any nausea vomiting.  Denies any fevers or chills.  Patient does report some increased fatigue over the past few weeks.  She also states she has started having some bright red blood in his stools.  Denies any hematochezia or melena.  She states the bright red blood in his stool is similar to when she had hemorrhoids.  Denies any syncope. Denies N/V. Denies any history of alcohol use or IV drugs.  Denies any history of hepatitis.  Labs show elevated T bili of 6.7 with transaminitis with alk-phos of 858 with elevated AST of 159 and ALT of 80.    The history is provided by the patient. No  was used.     Review of patient's allergies indicates:  No Known Allergies  Past Medical History:   Diagnosis Date    Breast cancer     Right    Diabetes mellitus     Hypertension     Liver lesion 2024     Past Surgical History:   Procedure Laterality Date    BREAST BIOPSY Left 10/06/2017    BREAST BIOPSY Right 1996    BREAST LUMPECTOMY Right     + CA     SECTION      , ,     COLONOSCOPY N/A 2019    Procedure: COLONOSCOPY;  Surgeon: Fredrick HERNADEZ  MD Leo;  Location: [facility] ([address] [place] [address]);  Service: Endoscopy;  Laterality: N/A;    HYSTERECTOMY       Family History   Problem Relation Name Age of Onset    Breast cancer Sister  48    Breast cancer Cousin mat         30's    Breast cancer Cousin pat 48    Pancreatic cancer Mother      Breast cancer Daughter      Ovarian cancer Neg Hx       Social History     Tobacco Use    Smoking status: Never    Smokeless tobacco: Never   Substance Use Topics    Alcohol use: No    Drug use: No     Review of Systems    Physical Exam     Initial Vitals   BP Pulse Resp Temp SpO2   11/04/24 1436 11/04/24 1436 11/04/24 1436 11/04/24 1436 11/04/24 1434   124/63 (!) 119 19 98.1 °F (36.7 °C) 98 %      MAP       --                Physical Exam    Nursing note and vitals reviewed.  Constitutional: She appears well-developed. She is not diaphoretic. No distress.   HENT:   Head: Normocephalic and atraumatic.   Nose: Nose normal.   Eyes: EOM are normal. Pupils are equal, round, and reactive to light. Scleral icterus is present.   Neck: Neck supple.   Normal range of motion.  Cardiovascular:  Normal rate, regular rhythm, normal heart sounds and intact distal pulses.     Exam reveals no gallop and no friction rub.       No murmur heard.  Pulmonary/Chest: Breath sounds normal. No stridor. No respiratory distress. She has no wheezes. She has no rhonchi. She has no rales.   Abdominal: Abdomen is soft. Bowel sounds are normal. She exhibits no distension. There is abdominal tenderness.   Positive Evans sign.  Right upper quadrant tenderness. There is no rebound and no guarding.   Musculoskeletal:         General: Normal range of motion.      Cervical back: Normal range of motion and neck supple.     Neurological: She is alert and oriented to person, place, and time. She has normal strength. No cranial nerve deficit or sensory deficit.   Skin: Skin is warm and dry. Capillary refill takes less than 2 seconds. No rash noted.   Psychiatric: She has a  normal mood and affect.         ED Course   Procedures  Labs Reviewed   CBC W/ AUTO DIFFERENTIAL - Abnormal       Result Value    WBC 10.62      RBC 3.69 (*)     Hemoglobin 10.3 (*)     Hematocrit 28.2 (*)     MCV 76 (*)     MCH 27.9      MCHC 36.5 (*)     RDW 17.1 (*)     Platelets 652 (*)     MPV 9.9      Immature Granulocytes 0.3      Gran # (ANC) 8.6 (*)     Immature Grans (Abs) 0.03      Lymph # 0.9 (*)     Mono # 1.0      Eos # 0.1      Baso # 0.05      nRBC 0      Gran % 80.8 (*)     Lymph % 8.9 (*)     Mono % 8.9      Eosinophil % 0.6      Basophil % 0.5      Differential Method Automated     COMPREHENSIVE METABOLIC PANEL - Abnormal    Sodium 139      Potassium 2.8 (*)     Chloride 93 (*)     CO2 21 (*)     Glucose 73      BUN 27 (*)     Creatinine 1.0      Calcium 9.9      Total Protein 7.5      Albumin 2.3 (*)     Total Bilirubin 6.4 (*)     Alkaline Phosphatase 830 (*)      (*)     ALT 78 (*)     eGFR >60      Anion Gap 25 (*)    URINALYSIS, REFLEX TO URINE CULTURE - Abnormal    Specimen UA Urine, Clean Catch      Color, UA Yellow      Appearance, UA Clear      pH, UA 6.0      Specific Gravity, UA >1.030 (*)     Protein, UA Trace (*)     Glucose, UA Negative      Ketones, UA Trace (*)     Bilirubin (UA) Negative      Occult Blood UA Trace (*)     Nitrite, UA Negative      Urobilinogen, UA Negative      Leukocytes, UA Negative      Narrative:     Specimen Source->Urine   PROTIME-INR - Abnormal    Prothrombin Time 13.6 (*)     INR 1.3 (*)    APTT - Abnormal    aPTT 36.3 (*)    LIPASE    Lipase 37     ISTAT CREATININE    POC Creatinine 1.0      Sample VENOUS     POCT GLUCOSE    POCT Glucose 87     POCT GLUCOSE MONITORING CONTINUOUS          Imaging Results               CT Abdomen Pelvis With IV Contrast NO Oral Contrast (Final result)  Result time 11/04/24 17:49:30      Final result by Andreea Young MD (11/04/24 17:49:30)                   Impression:      1. Mid descending colon 4 cm soft  tissue mass concerning for primary malignancy.  Future follow-up is advised.  2. Hepatomegaly with multiple hypoattenuating hepatic masses consistent with metastatic disease given aforementioned findings.  3. Multiple small pulmonary nodules also concerning for metastatic disease.  4. Postsurgical changes and additional findings as detailed above.  This report was flagged in Epic as abnormal.      Electronically signed by: Andreea Young MD  Date:    11/04/2024  Time:    17:49               Narrative:    EXAMINATION:  CT ABDOMEN PELVIS WITH IV CONTRAST    CLINICAL HISTORY:  Abdominal pain, acute, nonlocalized;    TECHNIQUE:  Low dose axial images, sagittal and coronal reformations were obtained from the lung bases to the pubic symphysis following the IV administration of 75 mL of Omnipaque 350 .  Oral contrast was not given.    COMPARISON:  Abdominal ultrasound from the same date.    FINDINGS:  The visualized portion of the heart is unremarkable.  Several small scattered pulmonary nodules are seen, the largest of which measures 6 mm in the right lower lobe.  Lung bases show no consolidation or pleural effusion.    Liver is enlarged measuring 22 cm with multiple scattered ill-defined hypoattenuating masses.  These range up to 5 cm in size in the right hepatic lobe.  There is no intra-or extrahepatic biliary ductal dilatation.  The gallbladder is contracted.  Portal vein and splenic vein are patent.  There is a small gastric diverticulum projecting posteriorly from the fundal region.  Stomach is otherwise normal in appearance.  Pancreas, spleen, and adrenal glands are unremarkable.    Kidneys enhance normally with no evidence of hydronephrosis.  Two nonobstructing right renal stones are visualized, the larger of which measures 0.9 cm.  No abnormalities are seen along the ureteral courses.  Urinary bladder is unremarkable.  Uterus has been removed.    Appendix is visualized and is unremarkable.  No evidence of bowel  obstruction.  Few scattered colonic diverticula are seen.  There is a 4 cm soft tissue mass visualized within the mid descending colon.  No free air or free fluid.    Aorta tapers normally.    No acute osseous abnormality identified. Small fat containing umbilical hernia is noted.                                        US Abdomen Limited (Final result)  Result time 11/04/24 16:58:14      Final result by Erika Larios MD (11/04/24 16:58:14)                   Impression:      Hepatomegaly with innumerable liver masses.  At this point, I am concerned for metastatic disease.  Recommend CT abdomen and pelvis with IV contrast, which according to the electronic medical record has been ordered.    Gallbladder is mostly contracted with no stones identified.    This report was flagged in Epic as abnormal.      Electronically signed by: Erika Larios  Date:    11/04/2024  Time:    16:58               Narrative:    EXAMINATION:  US ABDOMEN LIMITED    CLINICAL HISTORY:  ruq ttp;    TECHNIQUE:  Limited ultrasound of the right upper quadrant of the abdomen (including pancreas, liver, gallbladder, common bile duct, and spleen) was performed.    COMPARISON:  None.    FINDINGS:  Liver: Enlarged measuring 19.7 cm. Heterogeneous echotexture.  Innumerable masses essentially replacing the liver parenchyma.    Gallbladder: Mostly contracted.  No stones.    Biliary system: The common duct is not dilated, measuring 2 mm.  No intrahepatic ductal dilatation.    Spleen: Normal in size and echotexture, measuring 9.9 cm.    Miscellaneous: No upper abdominal ascites.                                       Medications   hydroCHLOROthiazide tablet 25 mg (25 mg Oral Given 11/5/24 0806)   NIFEdipine 24 hr tablet 60 mg (60 mg Oral Given 11/5/24 0806)   sodium chloride 0.9% flush 10 mL (has no administration in time range)   albuterol-ipratropium 2.5 mg-0.5 mg/3 mL nebulizer solution 3 mL (has no administration in time range)   melatonin tablet 6  mg (has no administration in time range)   ondansetron injection 4 mg (has no administration in time range)   prochlorperazine injection Soln 5 mg (has no administration in time range)   senna-docusate 8.6-50 mg per tablet 1 tablet (1 tablet Oral Not Given 11/5/24 0806)   acetaminophen tablet 650 mg (has no administration in time range)   naloxone 0.4 mg/mL injection 0.02 mg (has no administration in time range)   morphine injection 2 mg (has no administration in time range)   potassium chloride 10 mEq in 100 mL IVPB (0 mEq Intravenous Stopped 11/4/24 1809)   iohexoL (OMNIPAQUE 350) injection 75 mL (75 mLs Intravenous Given 11/4/24 1713)   potassium chloride SA CR tablet 40 mEq (40 mEq Oral Given 11/5/24 0027)   potassium chloride 10 mEq in 100 mL IVPB (10 mEq Intravenous New Bag 11/4/24 2245)   morphine injection 2 mg (2 mg Intravenous Given 11/4/24 2128)   0.9%  NaCl infusion ( Intravenous New Bag 11/4/24 2244)     Medical Decision Making  Concern for metastatic cancer discussed with patient and family in detail. Will admit for further workup and management with GI consult.     Amount and/or Complexity of Data Reviewed  Labs: ordered. Decision-making details documented in ED Course.  Radiology: ordered and independent interpretation performed.     Details: Multiple liver mets    Risk  Prescription drug management.  Decision regarding hospitalization.               ED Course as of 11/05/24 1653   Mon Nov 04, 2024   1503 69-year-old female past medical history of diabetes hypertension hyperlipidemia sent in from her PCP for elevated LFTs.  Labs show elevated T bili of 6.7 with transaminitis with alk-phos of 858 with elevated AST of 159 and ALT of 80.  Patient with some scleral icterus.  In right upper quadrant tenderness concerning for gallbladder disease.  Differential diagnosis includes choledocholithiasis, cholecystitis, hepatitis, pancreatitis, or less likely cholangitis. [BD]   1617 Repeat labs show elevated T  bili of 6.4.  ALP of 830.   and ALT of 78. [BD]   1707 US Abdomen Limited(!)  Impression:     Hepatomegaly with innumerable liver masses.  At this point, I am concerned for metastatic disease.  Recommend CT abdomen and pelvis with IV contrast, which according to the electronic medical record has been ordered.     Gallbladder is mostly contracted with no stones identified.     This report was flagged in Epic as abnormal.        Electronically signed by:Erika Larios  Date:                                            11/04/2024  Time:                                           16:58   [BD]   1752 Impression:     1. Mid descending colon 4 cm soft tissue mass concerning for primary malignancy.  Future follow-up is advised.  2. Hepatomegaly with multiple hypoattenuating hepatic masses consistent with metastatic disease given aforementioned findings.  3. Multiple small pulmonary nodules also concerning for metastatic disease.  4. Postsurgical changes and additional findings as detailed above.  This report was flagged in Epic as abnormal.        Electronically signed by:Andreea Young MD  Date:                                            11/04/2024  Time:                                           17:49   [BD]   1827 Discussed case with GI on-call, Dr. Alan, who agree with plan for admission for further cancer workup and treatment. [BD]      ED Course User Index  [BD] Edil Guzman MD                             Clinical Impression:  Final diagnoses:  [R17] Serum total bilirubin elevated (Primary)  [R00.0] Tachycardia  [E87.6] Hypokalemia  [R79.89] Elevated LFTs  [C79.9] Metastatic malignant neoplasm, unspecified site          ED Disposition Condition    Observation Stable                Edil Guzman MD  11/05/24 0272

## 2024-11-04 NOTE — Clinical Note
Diagnosis: Serum total bilirubin elevated [531200]   Future Attending Provider: SATYA FRIEDMAN [[id number]]

## 2024-11-04 NOTE — PROGRESS NOTES
History & Physical  [facility]      SUBJECTIVE:     History of Present Illness:  Patient is a 69 y.o. female presents to clinic to establish care. Current diagnoses include hx of HTN, and DMII    #GI symptoms/hematochezia  She reports abdominal pain and bloating that started approximately 1.5 months ago, triggered by eating or drinking. The pain is primarily in the upper abdomen, wrapping around to her right side. She experiences discomfort and bloating after consuming small amounts of food, leading to decreased appetite and early satiety. The discomfort affects her sleep due to difficulty finding a comfortable position. She has frequent bowel movements following meals. Since October 30th, she has noticed small amounts of bright red blood in her stool along with associated with pain during defecation. She has a history of hemorrhoids and experiences straining during bowel movements. She states these symptoms similar to her prior history of hemorrhoids. She uses Tucks pads for symptom relief. Advil has been ineffective in alleviating her symptoms. Patient was referred to GI clinic and has follow up with them later this month for further evaluation. Patient has had colonscopy completed in 2019. At that time non-bleeding external and internal hemorrhoids were found.    #Medical history  She has a history of high blood pressure, diabetes, high cholesterol, and breast cancer. She underwent a lumpectomy several years ago and continues to follow with [facility] heme/onc for surveillance    Last pap smear- s/p hysterectomy  Last mammogram- 3/24. Due 3/25  Immunizations UTD  Last HgbA1C- due  Last lipid panel- due      Review of patient's allergies indicates:  No Known Allergies    Past Medical History:   Diagnosis Date    Breast cancer 1996    Right    Diabetes mellitus     Hypertension      Past Surgical History:   Procedure Laterality Date    BREAST BIOPSY Left 10/06/2017    BREAST BIOPSY Right  "1996    BREAST LUMPECTOMY Right 1996    + CA     SECTION      , ,     COLONOSCOPY N/A 2019    Procedure: COLONOSCOPY;  Surgeon: Fredrick Bailey MD;  Location: [facility] ([address] [place] [address]);  Service: Endoscopy;  Laterality: N/A;    HYSTERECTOMY       Family History   Problem Relation Name Age of Onset    Breast cancer Sister  48    Breast cancer Cousin mat         30's    Breast cancer Cousin pat 48    Pancreatic cancer Mother      Breast cancer Daughter      Ovarian cancer Neg Hx       Social History     Tobacco Use    Smoking status: Never    Smokeless tobacco: Never   Substance Use Topics    Alcohol use: No    Drug use: No        OBJECTIVE:     Vital Signs (Most Recent)  Vitals:    24 1118   BP: 122/74   BP Location: Left arm   Patient Position: Sitting   Pulse: 110   SpO2: 97%   Weight: 60.4 kg (133 lb 2.5 oz)   Height: 5' 1" (1.549 m)         Physical Exam  Constitutional:       General: She is not in acute distress.     Appearance: Normal appearance. She is not toxic-appearing.   HENT:      Head: Normocephalic and atraumatic.      Right Ear: External ear normal.      Left Ear: External ear normal.      Nose: Nose normal.      Mouth/Throat:      Mouth: Mucous membranes are moist.   Eyes:      Extraocular Movements: Extraocular movements intact.   Cardiovascular:      Rate and Rhythm: Normal rate and regular rhythm.      Pulses: Normal pulses.      Heart sounds: Normal heart sounds.   Pulmonary:      Effort: Pulmonary effort is normal. No respiratory distress.   Abdominal:      General: Abdomen is flat. Bowel sounds are normal.      Palpations: Abdomen is soft. There is no mass.      Tenderness: There is no abdominal tenderness. There is no guarding or rebound.   Musculoskeletal:      Cervical back: Normal range of motion and neck supple.   Skin:     General: Skin is warm.      Findings: No bruising or erythema.   Neurological:      General: No focal deficit present.      Mental Status: " She is alert.   Psychiatric:         Mood and Affect: Mood normal.           ASSESSMENT/PLAN:   69 y.o.female presents to clinic to establish care.    Suspect recurrence of hemorrhoids as likely cause of bright red rectal bleeding episodes, however agree with further evaluation with GI  Patient to trial conservative therapies at this time: Sitz bath, tucks pads, prep-H, increasing fiber and hydration  Patient with RUQ pain triggered by intake along with increased bloating. Unsure of etiology. Will further eval with RUQ US. PRN bentyl for abdominal pain  Monitor diabetes management given recent medication changes, will update A1c and transition to metformin ER to reduce side effect profile from original metformin  ER precautions given if patient experiences greater amounts of hematochezia, intense nausea/abdominal pain, pre/syncope    Tosin was seen today for establish care, annual exam and gi problem.    Diagnoses and all orders for this visit:    Annual physical exam  -     Hemoglobin A1C; Future  -     CBC Auto Differential; Future  -     Comprehensive Metabolic Panel; Future  -     HIV 1/2 Ag/Ab (4th Gen); Future  -     Hepatitis C Antibody; Future  -     Lipid Panel; Future    Healthcare maintenance  -     Ambulatory referral/consult to Internal Medicine    Abdominal pain, unspecified abdominal location  -     dicyclomine (BENTYL) 10 MG capsule; Take 1 capsule (10 mg total) by mouth before meals as needed (For abdominal pain/bloating).  -     US Abdomen Limited; Future    Type 2 diabetes mellitus with hyperglycemia, without long-term current use of insulin  -     metFORMIN (GLUCOPHAGE-XR) 500 MG ER 24hr tablet; Take 1 tablet (500 mg total) by mouth daily with breakfast.    Epigastric pain        Follow-up: 1 month for diabetes and GI check     This note was generated with the assistance of ambient listening technology. Verbal consent was obtained by the patient and accompanying visitor(s) for the recording of  patient appointment to facilitate this note. I attest to having reviewed and edited the generated note for accuracy, though some syntax or spelling errors may persist. Please contact the author of this note for any clarification.      Lake Cunningham MD  [facility]

## 2024-11-04 NOTE — LETTER
"     November 6, 2024             [facility] Location ([facility])  [address] [place] [address]  Phone: [phone]  November 6, 2024     Patient: Tosin Ma (Shelia)     YOB: 1955        To Whom It May Concern:    Tosin Ma was admitted to the hospital on 11/4/2024  2:38 PM and discharged on 11/06/2024.  She may return to work on 11/11/2024.  If you have any questions or concerns, or if I can be of any further assistance, please do not hesitate to contact me.    Sincerely,        JANELL Garcia MD  Department of Hospital Medicine     "

## 2024-11-04 NOTE — PROGRESS NOTES
[facility] Plan of Care Note    Referral source: Primary Care Provider      Reason for consult: Abdominal Pain:  PCP message just notify us that the patient was seen in his clinic today for chronic right upper quadrant abdominal pain but with recent labs showing an increase in T bili and alk phos levels.  Patient has been referred to the emergency department.        Disposition recommended: Emergency Department      Additional Recommendations:  PCP had instructed patient to go to [facility] Emergency Department where I have been notified there is an extended waiting time.  Attempted to provide options for alternate emergency departments, including [facility] which has a much short of wait time today [place] likely go to [facility] Emergency Department for further evaluation and management.

## 2024-11-04 NOTE — Clinical Note
The site was marked. The right abdomen was prepped. The site was prepped with ChloraPrep. The site was clipped. The patient was draped. Liver biopsy site

## 2024-11-04 NOTE — LETTER
"     November 6, 2024             [facility] Location ([facility])  [address] [place] [address]  Phone: [phone]  November 6, 2024     Patient: Tosin Ma (Shelia)     YOB: 1955        To Whom It May Concern:    Tosin Ma was admitted to the hospital on 11/4/2024  2:38 PM and discharged on .  She may return to work on 11/11/2024 .  If you have any questions or concerns, or if I can be of any further assistance, please do not hesitate to contact me.    Sincerely,        JANELL Garcia MD  Department of Hospital Medicine     "

## 2024-11-05 ENCOUNTER — TELEPHONE (OUTPATIENT)
Dept: PALLIATIVE MEDICINE | Facility: CLINIC | Age: 69
End: 2024-11-05
Payer: COMMERCIAL

## 2024-11-05 PROBLEM — R17 SERUM TOTAL BILIRUBIN ELEVATED: Status: ACTIVE | Noted: 2024-01-01

## 2024-11-05 PROBLEM — E44.0 MODERATE MALNUTRITION: Status: ACTIVE | Noted: 2024-11-05

## 2024-11-05 PROBLEM — R16.0 LIVER MASS: Status: ACTIVE | Noted: 2024-01-01

## 2024-11-05 PROBLEM — R79.89 ELEVATED LFTS: Status: ACTIVE | Noted: 2024-11-05

## 2024-11-05 PROBLEM — K76.9 LIVER LESION: Status: ACTIVE | Noted: 2024-11-05

## 2024-11-05 LAB
ALBUMIN SERPL BCP-MCNC: 2.2 G/DL (ref 3.5–5.2)
ALP SERPL-CCNC: 720 U/L (ref 40–150)
ALT SERPL W/O P-5'-P-CCNC: 69 U/L (ref 10–44)
ANION GAP SERPL CALC-SCNC: 22 MMOL/L (ref 8–16)
AST SERPL-CCNC: 150 U/L (ref 10–40)
BILIRUB DIRECT SERPL-MCNC: 4.9 MG/DL (ref 0.1–0.3)
BILIRUB SERPL-MCNC: 5.7 MG/DL (ref 0.1–1)
BUN SERPL-MCNC: 23 MG/DL (ref 8–23)
CALCIUM SERPL-MCNC: 8.8 MG/DL (ref 8.7–10.5)
CHLORIDE SERPL-SCNC: 95 MMOL/L (ref 95–110)
CO2 SERPL-SCNC: 19 MMOL/L (ref 23–29)
CREAT SERPL-MCNC: 0.8 MG/DL (ref 0.5–1.4)
EST. GFR  (NO RACE VARIABLE): >60 ML/MIN/1.73 M^2
GLUCOSE SERPL-MCNC: 72 MG/DL (ref 70–110)
MAGNESIUM SERPL-MCNC: 1.8 MG/DL (ref 1.6–2.6)
PHOSPHATE SERPL-MCNC: 2.3 MG/DL (ref 2.7–4.5)
POTASSIUM SERPL-SCNC: 3.6 MMOL/L (ref 3.5–5.1)
PROT SERPL-MCNC: 6.7 G/DL (ref 6–8.4)
SODIUM SERPL-SCNC: 136 MMOL/L (ref 136–145)

## 2024-11-05 PROCEDURE — G0378 HOSPITAL OBSERVATION PER HR: HCPCS

## 2024-11-05 PROCEDURE — 96361 HYDRATE IV INFUSION ADD-ON: CPT

## 2024-11-05 PROCEDURE — 84100 ASSAY OF PHOSPHORUS: CPT | Performed by: NURSE PRACTITIONER

## 2024-11-05 PROCEDURE — 51798 US URINE CAPACITY MEASURE: CPT | Mod: 59

## 2024-11-05 PROCEDURE — 83735 ASSAY OF MAGNESIUM: CPT | Performed by: NURSE PRACTITIONER

## 2024-11-05 PROCEDURE — 25000003 PHARM REV CODE 250: Performed by: NURSE PRACTITIONER

## 2024-11-05 PROCEDURE — 99152 MOD SED SAME PHYS/QHP 5/>YRS: CPT | Performed by: INTERNAL MEDICINE

## 2024-11-05 PROCEDURE — 36415 COLL VENOUS BLD VENIPUNCTURE: CPT | Performed by: NURSE PRACTITIONER

## 2024-11-05 PROCEDURE — 63600175 PHARM REV CODE 636 W HCPCS: Performed by: INTERNAL MEDICINE

## 2024-11-05 PROCEDURE — 99233 SBSQ HOSP IP/OBS HIGH 50: CPT | Mod: 25,,, | Performed by: INTERNAL MEDICINE

## 2024-11-05 PROCEDURE — 80048 BASIC METABOLIC PNL TOTAL CA: CPT | Performed by: NURSE PRACTITIONER

## 2024-11-05 PROCEDURE — 94761 N-INVAS EAR/PLS OXIMETRY MLT: CPT | Mod: 59

## 2024-11-05 PROCEDURE — 80076 HEPATIC FUNCTION PANEL: CPT | Performed by: NURSE PRACTITIONER

## 2024-11-05 RX ORDER — MIDAZOLAM HYDROCHLORIDE 1 MG/ML
INJECTION INTRAMUSCULAR; INTRAVENOUS
Status: DISCONTINUED | OUTPATIENT
Start: 2024-11-05 | End: 2024-11-05 | Stop reason: HOSPADM

## 2024-11-05 RX ORDER — LIDOCAINE HYDROCHLORIDE 10 MG/ML
INJECTION, SOLUTION INFILTRATION; PERINEURAL
Status: DISCONTINUED | OUTPATIENT
Start: 2024-11-05 | End: 2024-11-05 | Stop reason: HOSPADM

## 2024-11-05 RX ORDER — FENTANYL CITRATE 50 UG/ML
INJECTION, SOLUTION INTRAMUSCULAR; INTRAVENOUS
Status: DISCONTINUED | OUTPATIENT
Start: 2024-11-05 | End: 2024-11-05 | Stop reason: HOSPADM

## 2024-11-05 RX ORDER — TRAMADOL HYDROCHLORIDE 50 MG/1
50 TABLET ORAL ONCE
Status: COMPLETED | OUTPATIENT
Start: 2024-11-05 | End: 2024-11-05

## 2024-11-05 RX ADMIN — TRAMADOL HYDROCHLORIDE 50 MG: 50 TABLET, COATED ORAL at 08:11

## 2024-11-05 RX ADMIN — HYDROCHLOROTHIAZIDE 25 MG: 25 TABLET ORAL at 08:11

## 2024-11-05 RX ADMIN — POTASSIUM CHLORIDE 40 MEQ: 1500 TABLET, EXTENDED RELEASE ORAL at 12:11

## 2024-11-05 RX ADMIN — NIFEDIPINE 60 MG: 30 TABLET, FILM COATED, EXTENDED RELEASE ORAL at 08:11

## 2024-11-05 NOTE — SUBJECTIVE & OBJECTIVE
Past Medical History:   Diagnosis Date    Breast cancer     Right    Diabetes mellitus     Hypertension        Past Surgical History:   Procedure Laterality Date    BREAST BIOPSY Left 10/06/2017    BREAST BIOPSY Right 1996    BREAST LUMPECTOMY Right 1996    + CA     SECTION      , ,     COLONOSCOPY N/A 2019    Procedure: COLONOSCOPY;  Surgeon: Fredrick Bailey MD;  Location: [facility] ([address] [place] [address]);  Service: Endoscopy;  Laterality: N/A;    HYSTERECTOMY         Review of patient's allergies indicates:  No Known Allergies    No current facility-administered medications on file prior to encounter.     Current Outpatient Medications on File Prior to Encounter   Medication Sig    atorvastatin (LIPITOR) 20 MG tablet Take 1 tablet by mouth once daily.    dicyclomine (BENTYL) 10 MG capsule Take 1 capsule (10 mg total) by mouth before meals as needed (For abdominal pain/bloating).    glipiZIDE (GLUCOTROL) 5 MG TR24 Take 5 mg by mouth daily with breakfast.    hydrochlorothiazide (HYDRODIURIL) 25 MG tablet Take 25 mg by mouth once daily.    metFORMIN (GLUCOPHAGE-XR) 500 MG ER 24hr tablet Take 1 tablet (500 mg total) by mouth daily with breakfast.    nifedipine (PROCARDIA-XL) 60 MG (OSM) 24 hr tablet Take 60 mg by mouth once daily.    [DISCONTINUED] LANTUS SOLOSTAR 100 unit/mL (3 mL) InPn pen     [DISCONTINUED] metformin (GLUCOPHAGE) 500 MG tablet Take 500 mg by mouth daily with breakfast.    [DISCONTINUED] pioglitazone (ACTOS) 30 MG tablet Take 30 mg by mouth once daily.     Family History       Problem Relation (Age of Onset)    Breast cancer Sister (48), Cousin, Cousin (48), Daughter    Pancreatic cancer Mother          Tobacco Use    Smoking status: Never    Smokeless tobacco: Never   Substance and Sexual Activity    Alcohol use: No    Drug use: No    Sexual activity: Yes     Partners: Male     Birth control/protection: See Surgical Hx     Review of Systems   Constitutional:  Positive for appetite  change and fatigue. Negative for activity change, chills and fever.   HENT:  Negative for congestion, sore throat and trouble swallowing.    Eyes:  Negative for photophobia and visual disturbance.   Respiratory:  Negative for cough, chest tightness and shortness of breath.    Cardiovascular:  Negative for chest pain, palpitations and leg swelling.   Gastrointestinal:  Positive for abdominal distention and abdominal pain. Negative for diarrhea and nausea.   Genitourinary:  Negative for dysuria, flank pain and hematuria.   Musculoskeletal:  Positive for back pain.   Neurological:  Negative for dizziness, weakness and headaches.   Psychiatric/Behavioral:  Negative for confusion.      Objective:     Vital Signs (Most Recent):  Temp: 97.8 °F (36.6 °C) (11/04/24 2030)  Pulse: 110 (11/04/24 2125)  Resp: 18 (11/04/24 2128)  BP: (!) 117/58 (11/04/24 2125)  SpO2: 99 % (11/04/24 2030) Vital Signs (24h Range):  Temp:  [97.8 °F (36.6 °C)-98.1 °F (36.7 °C)] 97.8 °F (36.6 °C)  Pulse:  [100-127] 110  Resp:  [17-19] 18  SpO2:  [97 %-99 %] 99 %  BP: (100-127)/(58-74) 117/58     Weight: 60.3 kg (133 lb)  Body mass index is 25.13 kg/m².     Physical Exam  Vitals reviewed.   Constitutional:       Appearance: Normal appearance. She is normal weight.   HENT:      Head: Normocephalic.      Mouth/Throat:      Mouth: Mucous membranes are moist.      Pharynx: Oropharynx is clear.   Eyes:      General: Lids are normal. Gaze aligned appropriately. Scleral icterus present.      Comments: Jaundiced sclera   Cardiovascular:      Rate and Rhythm: Regular rhythm. Tachycardia present.      Pulses: Normal pulses.      Heart sounds: Normal heart sounds.   Pulmonary:      Effort: Pulmonary effort is normal.      Breath sounds: Normal breath sounds.   Abdominal:      General: Bowel sounds are normal.      Palpations: Abdomen is soft. There is hepatomegaly.   Musculoskeletal:         General: Normal range of motion.      Cervical back: Normal range of  motion.   Skin:     General: Skin is warm and dry.      Coloration: Skin is jaundiced.   Neurological:      Mental Status: She is alert and oriented to person, place, and time. Mental status is at baseline.   Psychiatric:         Mood and Affect: Mood normal.                Significant Labs: All pertinent labs within the past 24 hours have been reviewed.  CBC:   Recent Labs   Lab 11/04/24  1200 11/04/24  1530   WBC 10.54 10.62   HGB 10.5* 10.3*   HCT 29.4* 28.2*   * 652*     CMP:   Recent Labs   Lab 11/04/24  1200 11/04/24  1530    139   K 3.0* 2.8*   CL 92* 93*   CO2 20* 21*    73   BUN 27* 27*   CREATININE 0.9 1.0   CALCIUM 9.9 9.9   PROT 7.7 7.5   ALBUMIN 2.5* 2.3*   BILITOT 6.7* 6.4*   ALKPHOS 858* 830*   * 159*   ALT 80* 78*   ANIONGAP 24* 25*       Significant Imaging: I have reviewed all pertinent imaging results/findings within the past 24 hours.  .im

## 2024-11-05 NOTE — CONSULTS
Met with patient at bedside. Family members at bedside as well. Introduced role of palliative medicine as an extra layer of support as she navigates through this serious illness. She was very appreciative of this. Informed her that we will set up outpatient palliative clinic referral at [facility], since she is getting discharged soon. She verb understanding. Information sheet given to patient. Reached out to team at [facility] to inform them of the referral.

## 2024-11-05 NOTE — CONSULTS
Gastroenterology Consult    11/5/2024  8:00 AM    Consulting Physician:  Elmer Ford MD    Primary Care Provider: Lake Cunningham MD    Reason for consultation: Abdominal pain     HPI:  Tosin Ma is a 69 y.o. female with a PMHx of breast cancer s/p lumpectomy/rad tx ini 1996, DM2, HTN, and HLD who presented as a referral from PCP secondary to elevated LFT's taken as part of a work up of RUQ abdominal pain.   Pain started about one month ago with radiation to her back, no fever or chills.  BMs have been overall unchanged with the exception of some hemorrhoidal bleeding.     In the ED, she was noted to have elevated LFT's in a primarily obstructive pattern with a T Bili 6.7 and AlkPhos 830.  CT with a possible 4 cm soft tissue mass in the descending colon with hepatomegaly and multiple attenuating hepatic masses c/w metastatic disease.  In discussions with patient, she notice her urine darkening about one week ago.   She is up to date with colonoscopy, last 5/31/2019 with Dr. Bailey with fair bowel prep, few diverticula, and internal/external hemorrhoids.  She was given a 10 year recommended surveillance interval.    Past Medical History:  Past Medical History:   Diagnosis Date    Breast cancer 1996    Right    Diabetes mellitus     Hypertension        Allergies:   Review of patient's allergies indicates:  No Known Allergies    Current Medications:  Medications Prior to Admission   Medication Sig Dispense Refill Last Dose/Taking    atorvastatin (LIPITOR) 20 MG tablet Take 1 tablet by mouth once daily.   Past Month    glipiZIDE (GLUCOTROL) 5 MG TR24 Take 5 mg by mouth daily with breakfast.   11/3/2024    hydrochlorothiazide (HYDRODIURIL) 25 MG tablet Take 25 mg by mouth once daily.  0 11/3/2024    metFORMIN (GLUCOPHAGE-XR) 500 MG ER 24hr tablet Take 1 tablet (500 mg total) by mouth daily with breakfast. 90 tablet 3 Past Month    nifedipine (PROCARDIA-XL) 60 MG (OSM) 24 hr tablet Take 60 mg by mouth once  daily.  0 11/3/2024    dicyclomine (BENTYL) 10 MG capsule Take 1 capsule (10 mg total) by mouth before meals as needed (For abdominal pain/bloating). 40 capsule 0          Social History:  Social History     Socioeconomic History    Marital status:    Tobacco Use    Smoking status: Never    Smokeless tobacco: Never   Substance and Sexual Activity    Alcohol use: No    Drug use: No    Sexual activity: Yes     Partners: Male     Birth control/protection: See Surgical Hx     Social Drivers of Health     Financial Resource Strain: Low Risk  (2024)    Overall Financial Resource Strain (CARDIA)     Difficulty of Paying Living Expenses: Not hard at all   Food Insecurity: No Food Insecurity (2024)    Hunger Vital Sign     Worried About Running Out of Food in the Last Year: Never true     Ran Out of Food in the Last Year: Never true   Transportation Needs: No Transportation Needs (2024)    TRANSPORTATION NEEDS     Transportation : No   Physical Activity: Insufficiently Active (2024)    Exercise Vital Sign     Days of Exercise per Week: 4 days     Minutes of Exercise per Session: 10 min   Stress: Stress Concern Present (2024)    [employer] - Occupational Stress Questionnaire     Feeling of Stress : To some extent   Housing Stability: Low Risk  (2024)    Housing Stability Vital Sign     Unable to Pay for Housing in the Last Year: No     Homeless in the Last Year: No       Surgical History:  Past Surgical History:   Procedure Laterality Date    BREAST BIOPSY Left 10/06/2017    BREAST BIOPSY Right 1996    BREAST LUMPECTOMY Right 1996    + CA     SECTION      , ,     COLONOSCOPY N/A 2019    Procedure: COLONOSCOPY;  Surgeon: Fredrick Bailey MD;  Location: [address];  Service: Endoscopy;  Laterality: N/A;    HYSTERECTOMY           Family History:  Family History   Problem Relation Name Age of Onset    Breast cancer Sister  48     Breast cancer Cousin mat         30's    Breast cancer Cousin pat 48    Pancreatic cancer Mother      Breast cancer Daughter      Ovarian cancer Neg Hx         Review of systems:     CONSTITUTIONAL: Negative for fever, chills, weakness, weight loss, weight gain.  HEENT: Negative for blurred vision, hearing loss, nasal congestion, dry mouth, sore throat.  CARDIOVASCULAR: Negative for chest pain or palpitations.  RESPIRATORY: Negative for SOB or cough.  GASTROINTESTINAL: See HPI  GENITOURINARY: Negative for dysuria or hematuria.  MUSCULOSKELETAL: Negative for osteoarthritis or muscle pain.  SKIN: Negative for rashes/lesions.  NEUROLOGIC: Negative for headaches, numbness/tingling.  ENDOCRINE: Negative for diabetes or thyroid abnormalities.  HEMATOLOGIC: Negative for anemia or blood dyscrasias.  Aside from above positives, complete 10 point review of systems negative.    Physical Exam:  Vital Signs (Most Recent):  Temp: 98 °F (36.7 °C) (11/05/24 0726)  Pulse: 102 (11/05/24 0726)  Resp: 16 (11/05/24 0726)  BP: 108/65 (11/05/24 0726)  SpO2: 95 % (11/05/24 0726) Vital Signs (24h Range):  Temp:  [97.6 °F (36.4 °C)-98.2 °F (36.8 °C)] 98 °F (36.7 °C)  Pulse:  [] 102  Resp:  [16-19] 16  SpO2:  [95 %-99 %] 95 %  BP: (100-127)/(56-74) 108/65       General: Well developed, well nourished, female in no acute distress.    Eyes:  Bilateral icteric sclera, PERRLA  ENT:  Moist mucous membranes, no drainage from ears or nose, hearing grossly intact  Lymph:  No cervical, supraclavicular or axillary lymphadenopathy  Neck:  Supple, no nodes or masses felt, no thyromegaly  Cardiovascular:  Tachycardia without murmur  Lungs:  Clear to auscultation with normal effort; no wheezes or rales noted  GI:  soft, mild tenderness, hepatomegaly  Musculoskeletal:  5/5 strength bilaterally  Extremities: No clubbing, cyanosis, or edema, 2+ dorsalis pedis bilaterally  Neurologic:  No focal deficits, alert and oriented x 3  Psych:  Appropriate mood  and affect  Skin:  No rash, no pallor, no lesions       Labs:   Latest Reference Range & Units 11/04/24 12:00 11/04/24 15:30   WBC 3.90 - 12.70 K/uL 10.54 10.62   RBC 4.00 - 5.40 M/uL 3.80 (L) 3.69 (L)   Hemoglobin 12.0 - 16.0 g/dL 10.5 (L) 10.3 (L)   Hematocrit 37.0 - 48.5 % 29.4 (L) 28.2 (L)   MCV 82 - 98 fL 77 (L) 76 (L)   MCH 27.0 - 31.0 pg 27.6 27.9   MCHC 32.0 - 36.0 g/dL 35.7 36.5 (H)   RDW 11.5 - 14.5 % 17.0 (H) 17.1 (H)   Platelet Count 150 - 450 K/uL 629 (H) 652 (H)   (L): Data is abnormally low  (H): Data is abnormally high   Latest Reference Range & Units 11/04/24 12:00 11/04/24 15:30   Sodium 136 - 145 mmol/L 136 139   Potassium 3.5 - 5.1 mmol/L 3.0 (L) 2.8 (L)   Chloride 95 - 110 mmol/L 92 (L) 93 (L)   CO2 23 - 29 mmol/L 20 (L) 21 (L)   Anion Gap 8 - 16 mmol/L 24 (H) 25 (H)   BUN 8 - 23 mg/dL 27 (H) 27 (H)   Creatinine 0.5 - 1.4 mg/dL 0.9 1.0   eGFR >60 mL/min/1.73 m^2 >60 >60   Glucose 70 - 110 mg/dL 101 73   Calcium 8.7 - 10.5 mg/dL 9.9 9.9   ALP 40 - 150 U/L 858 (H) 830 (H)   PROTEIN TOTAL 6.0 - 8.4 g/dL 7.7 7.5   Albumin 3.5 - 5.2 g/dL 2.5 (L) 2.3 (L)   BILIRUBIN TOTAL 0.1 - 1.0 mg/dL 6.7 (H) 6.4 (H)   AST 10 - 40 U/L 159 (H) 159 (H)   ALT 10 - 44 U/L 80 (H) 78 (H)   Lipase 4 - 60 U/L  37   (L): Data is abnormally low  (H): Data is abnormally high   Latest Reference Range & Units 11/04/24 18:18   PT 9.0 - 12.5 sec 13.6 (H)   INR 0.8 - 1.2  1.3 (H)   PTT 21.0 - 32.0 sec 36.3 (H)   (H): Data is abnormally high    Imaging and Other Studies:  Personally reviewed c/w likely primary colon with liver mets.    CT ABDOMEN PELVIS WITH IV CONTRAST     CLINICAL HISTORY:  Abdominal pain, acute, nonlocalized;     TECHNIQUE:  Low dose axial images, sagittal and coronal reformations were obtained from the lung bases to the pubic symphysis following the IV administration of 75 mL of Omnipaque 350 .  Oral contrast was not given.     COMPARISON:  Abdominal ultrasound from the same date.     FINDINGS:  The visualized  portion of the heart is unremarkable.  Several small scattered pulmonary nodules are seen, the largest of which measures 6 mm in the right lower lobe.  Lung bases show no consolidation or pleural effusion.     Liver is enlarged measuring 22 cm with multiple scattered ill-defined hypoattenuating masses.  These range up to 5 cm in size in the right hepatic lobe.  There is no intra-or extrahepatic biliary ductal dilatation.  The gallbladder is contracted.  Portal vein and splenic vein are patent.  There is a small gastric diverticulum projecting posteriorly from the fundal region.  Stomach is otherwise normal in appearance.  Pancreas, spleen, and adrenal glands are unremarkable.     Kidneys enhance normally with no evidence of hydronephrosis.  Two nonobstructing right renal stones are visualized, the larger of which measures 0.9 cm.  No abnormalities are seen along the ureteral courses.  Urinary bladder is unremarkable.  Uterus has been removed.     Appendix is visualized and is unremarkable.  No evidence of bowel obstruction.  Few scattered colonic diverticula are seen.  There is a 4 cm soft tissue mass visualized within the mid descending colon.  No free air or free fluid.     Aorta tapers normally.     No acute osseous abnormality identified. Small fat containing umbilical hernia is noted.     Impression:     1. Mid descending colon 4 cm soft tissue mass concerning for primary malignancy.  Future follow-up is advised.  2. Hepatomegaly with multiple hypoattenuating hepatic masses consistent with metastatic disease given aforementioned findings.  3. Multiple small pulmonary nodules also concerning for metastatic disease.  4. Postsurgical changes and additional findings as detailed above.  This report was flagged in Epic as abnormal.        Electronically signed by: Andreea Young MD  Date:                                            11/04/2024  Time:                                           17:49    EXAMINATION:  US  ABDOMEN LIMITED     CLINICAL HISTORY:  ruq ttp;     TECHNIQUE:  Limited ultrasound of the right upper quadrant of the abdomen (including pancreas, liver, gallbladder, common bile duct, and spleen) was performed.     COMPARISON:  None.     FINDINGS:  Liver: Enlarged measuring 19.7 cm. Heterogeneous echotexture.  Innumerable masses essentially replacing the liver parenchyma.     Gallbladder: Mostly contracted.  No stones.     Biliary system: The common duct is not dilated, measuring 2 mm.  No intrahepatic ductal dilatation.     Spleen: Normal in size and echotexture, measuring 9.9 cm.     Miscellaneous: No upper abdominal ascites.     Impression:     Hepatomegaly with innumerable liver masses.  At this point, I am concerned for metastatic disease.  Recommend CT abdomen and pelvis with IV contrast, which according to the electronic medical record has been ordered.     Gallbladder is mostly contracted with no stones identified.     This report was flagged in Epic as abnormal.        Electronically signed by: Erika Lairos  Date:                                            11/04/2024  Time:                                           16:58    Assessment:  Patient is a 70 yo with a PMHx of breast cancer s/p lumpectomy/rad tx ini 1996, DM2, HTN, and HLD who presented as a referral from PCP secondary to elevated LFT's taken as part of a work up of RUQ abdominal pain.   Imaging c/w likely colon primary with mets to the liver, largest 5 cm.      Plan:   Would consult IR for liver biopsy today.     Can plan colonoscopy in the near future pending above.   Hold NPO   Oncology consultation   No need for further biliary imaging as the obstructive LFTs likely due to metastatic tumor burden.      Case discussed with Dr. Jose Ford

## 2024-11-05 NOTE — HPI
Tosin Ma is a 69-year-old female past medical history of breast cancer, diabetes, hypertension and hyperlipidemia sent in from her PCP for elevated LFTs. Patient reports about 1 month of abdominal pain and bloating that worsens with eating or drinking.  She also reports frequent bowel movements following eating without diarrhea or constipation.  Patient describes right upper quadrant pain that radiates to her back.  She also states she has started having some bright red blood in his stools. Denies any hematochezia or melena. She states the bright red blood in his stool is similar to when she had hemorrhoids.  Patient states she has had a decreased appetite with increased fatigue over the past few weeks.  She denies chest pain, shortness of breath, syncope, fever and chills.    ED lab work significant for elevated T bili of 6.7 with transaminitis with alk-phos of 830 with elevated AST of 159 and ALT of 80.  Albumin 2.3.  Potassium 2.0.  UA negative for UTI hemoglobin stable.  Platelets 652.  CT abdomen showed had descending colon 4 cm soft tissue mass concerning for primary malignancy.  Good hepatomegaly with multiple hypoattenuating hepatic masses consistent with metastatic disease.  Patient received 1 L IV fluids in the ED.  ED provider spoke to GI on-call, who agreed consult and patient referred to Hospital Medicine.  Patient will be admitted for further evaluation management.

## 2024-11-05 NOTE — SUBJECTIVE & OBJECTIVE
Interval History: No acute events overnight. Extensive discussion of plan of care with patient /  at bedside. No other concerns at this time.    Review of Systems   Constitutional:  Negative for chills and fever.   Respiratory:  Negative for cough and shortness of breath.    Cardiovascular:  Negative for chest pain and palpitations.   Gastrointestinal:  Positive for abdominal pain. Negative for nausea and vomiting.     Objective:     Vital Signs (Most Recent):  Temp: 98 °F (36.7 °C) (11/05/24 1557)  Pulse: 99 (11/05/24 1557)  Resp: 16 (11/05/24 1557)  BP: (!) 111/58 (11/05/24 1557)  SpO2: 97 % (11/05/24 1557) Vital Signs (24h Range):  Temp:  [97.6 °F (36.4 °C)-98.6 °F (37 °C)] 98 °F (36.7 °C)  Pulse:  [] 99  Resp:  [16-19] 16  SpO2:  [95 %-99 %] 97 %  BP: (100-135)/(56-74) 111/58     Weight: 60.4 kg (133 lb 2.5 oz)  Body mass index is 25.16 kg/m².    Intake/Output Summary (Last 24 hours) at 11/5/2024 1623  Last data filed at 11/5/2024 1251  Gross per 24 hour   Intake 400 ml   Output 350 ml   Net 50 ml         Physical Exam  Vitals and nursing note reviewed.   Constitutional:       General: She is not in acute distress.     Appearance: She is well-developed.   HENT:      Head: Normocephalic and atraumatic.   Eyes:      General: Scleral icterus present.         Right eye: No discharge.         Left eye: No discharge.      Conjunctiva/sclera: Conjunctivae normal.   Cardiovascular:      Rate and Rhythm: Normal rate.      Pulses: Normal pulses.   Pulmonary:      Effort: Pulmonary effort is normal. No respiratory distress.   Abdominal:      Palpations: Abdomen is soft.      Tenderness: There is abdominal tenderness (RUQ).   Musculoskeletal:         General: Normal range of motion.      Right lower leg: No edema.      Left lower leg: No edema.   Skin:     General: Skin is warm and dry.      Coloration: Skin is jaundiced.   Neurological:      Mental Status: She is alert and oriented to person, place, and time.          Significant Labs:   CBC:  Recent Labs   Lab 11/04/24  1200 11/04/24  1530   WBC 10.54 10.62   HGB 10.5* 10.3*   HCT 29.4* 28.2*   * 652*   GRAN 81.2*  8.6* 80.8*  8.6*   LYMPH 9.2*  1.0 8.9*  0.9*   MONO 8.0  0.8 8.9  1.0   EOS 0.1 0.1   BASO 0.05 0.05   CMP:  Recent Labs   Lab 11/04/24  1200 11/04/24  1530 11/05/24  0024    139 136   K 3.0* 2.8* 3.6   CL 92* 93* 95   CO2 20* 21* 19*   BUN 27* 27* 23   CREATININE 0.9 1.0 0.8    73 72   CALCIUM 9.9 9.9 8.8   MG  --   --  1.8   PHOS  --   --  2.3*   ALKPHOS 858* 830* 720*   * 159* 150*   ALT 80* 78* 69*   BILITOT 6.7* 6.4* 5.7*   PROT 7.7 7.5 6.7   ALBUMIN 2.5* 2.3* 2.2*   ANIONGAP 24* 25* 22*      Significant Imaging: I have reviewed and interpreted all pertinent imaging results/findings within the past 24 hours.

## 2024-11-05 NOTE — ASSESSMENT & PLAN NOTE
Patients blood pressure range in the last 24 hours was: BP  Min: 100/61  Max: 127/74.The patient's inpatient anti-hypertensive regimen is listed below:  Current Antihypertensives  hydroCHLOROthiazide tablet 25 mg, Daily, Oral  NIFEdipine 24 hr tablet 60 mg, Daily, Oral    Plan  - BP is controlled, no changes needed to their regimen  -

## 2024-11-05 NOTE — PLAN OF CARE
Problem: Adult Inpatient Plan of Care  Goal: Plan of Care Review  Outcome: Progressing  Goal: Patient-Specific Goal (Individualized)  Outcome: Progressing  Goal: Absence of Hospital-Acquired Illness or Injury  Outcome: Progressing  Goal: Optimal Comfort and Wellbeing  Outcome: Progressing  Goal: Readiness for Transition of Care  Outcome: Progressing     Problem: Infection  Goal: Absence of Infection Signs and Symptoms  Outcome: Progressing     Problem: Diabetes Comorbidity  Goal: Blood Glucose Level Within Targeted Range  Outcome: Progressing     Problem: Coping Ineffective  Goal: Effective Coping  Outcome: Progressing     Problem: Wound  Goal: Optimal Coping  Outcome: Progressing  Goal: Optimal Functional Ability  Outcome: Progressing  Goal: Absence of Infection Signs and Symptoms  Outcome: Progressing  Goal: Improved Oral Intake  Outcome: Progressing  Goal: Optimal Pain Control and Function  Outcome: Progressing  Goal: Skin Health and Integrity  Outcome: Progressing  Goal: Optimal Wound Healing  Outcome: Progressing

## 2024-11-05 NOTE — ASSESSMENT & PLAN NOTE
Hepatic mass concerning for metastatic disease    Patient presented with abdominal pain, nausea and abdominal distention.  ALP 30, , ALT 78 CT abdomen showed Mid descending colon 4 cm soft tissue mass concerning for primary malignancy. Future follow-up is advised. 2. Hepatomegaly with multiple hypoattenuating hepatic masses consistent with metastatic disease.  Hepatomegaly, jaundice and scleral icterus present    -GI consulted, recommendations appreciated  -prn pain medication antiemetics  -PT INR and PTT pending  -hold atorvastatin

## 2024-11-05 NOTE — PLAN OF CARE
Pt remained free of falls and injuries throughout shift. AAOx4. Pt calm and cooperative. Purposeful hourly rounding performed. Pt swallows meds whole. Pt received IV and PO K+ supplements last night. IV flushed and saline locked. Managed c/o lower back pain with IV morphine. Patient ambulates independently throughout room. VSS on room air. Pt sitting up in bed talking with family, denies needs at this time. Bed low and locked, call light in reach. Side rails up x2. Report given to oncoming nurse.

## 2024-11-05 NOTE — CHAPLAIN
11/05/24 1520   Clinical Encounter Type   Visit Type Initial Visit   Visit Category General Rounding   Visited With Patient and family together   Number of Family Visited 1   Length of Visit 15 Minutes   Patient Spiritual Encounters   Care Provided Compassionate presence;Reflective listening;Life review/ reflection;Explored pt/family concerns   Patient Coping Open/discussion;Accepting   Comments - Patient patient stated that she had received good news RE her medical condition and further states that she is feeling confident and grateful.   Family Spiritual Encounters   Care Provided Compassionate presence;Reflective listening   Family Coping Open/discussion   Comments - Family patient's  thanked me for my visit

## 2024-11-05 NOTE — CONSULTS
VIR Pre-procedure H&P    Chief Complaint: abdominal pain    History of Present Illness:  Tosin Ma is a 69 y.o. female PMHx of breast cancer s/p lumpectomy/rad tx in , DM2, HTN, and HLD who presented due to elevated LFT's taken as part of a work up of RUQ abdominal pain. CT with a possible 4 cm soft tissue mass in the descending colon with hepatomegaly and multiple attenuating hepatic masses c/w metastatic disease.  IR consulted for liver biopsy.      Admission H&P reviewed.  Past Medical History:   Diagnosis Date    Breast cancer     Right    Diabetes mellitus     Hypertension      Past Surgical History:   Procedure Laterality Date    BREAST BIOPSY Left 10/06/2017    BREAST BIOPSY Right 1996    BREAST LUMPECTOMY Right 1996    + CA     SECTION      , ,     COLONOSCOPY N/A 2019    Procedure: COLONOSCOPY;  Surgeon: Fredrick Bailey MD;  Location: [facility] ([address] [place] [address]);  Service: Endoscopy;  Laterality: N/A;    HYSTERECTOMY         Review of Systems:   As documented in primary team H&P    Home Meds:   Prior to Admission medications    Medication Sig Start Date End Date Taking? Authorizing Provider   atorvastatin (LIPITOR) 20 MG tablet Take 1 tablet by mouth once daily. 24  Yes Provider, Historical   glipiZIDE (GLUCOTROL) 5 MG TR24 Take 5 mg by mouth daily with breakfast. 18  Yes Provider, Historical   hydrochlorothiazide (HYDRODIURIL) 25 MG tablet Take 25 mg by mouth once daily. 14  Yes Provider, Historical   metFORMIN (GLUCOPHAGE-XR) 500 MG ER 24hr tablet Take 1 tablet (500 mg total) by mouth daily with breakfast. 24 Yes Lake Cunningham MD   nifedipine (PROCARDIA-XL) 60 MG (OSM) 24 hr tablet Take 60 mg by mouth once daily. 17  Yes Provider, Historical   dicyclomine (BENTYL) 10 MG capsule Take 1 capsule (10 mg total) by mouth before meals as needed (For abdominal pain/bloating). 24  Lake Cunningham MD     Scheduled Meds:     hydroCHLOROthiazide  25 mg Oral Daily    NIFEdipine  60 mg Oral Daily    senna-docusate 8.6-50 mg  1 tablet Oral BID     Continuous Infusions:   PRN Meds:  Current Facility-Administered Medications:     acetaminophen, 650 mg, Oral, Q8H PRN    albuterol-ipratropium, 3 mL, Nebulization, Q6H PRN    melatonin, 6 mg, Oral, Nightly PRN    morphine, 2 mg, Intravenous, Q6H PRN    naloxone, 0.02 mg, Intravenous, PRN    ondansetron, 4 mg, Intravenous, Q6H PRN    prochlorperazine, 5 mg, Intravenous, Q6H PRN    sodium chloride 0.9%, 10 mL, Intravenous, Q8H PRN  Anticoagulants/Antiplatelets: no anticoagulation    Allergies: Review of patient's allergies indicates:  No Known Allergies  Sedation Hx: have not been any systemic reactions    Labs:  Recent Labs   Lab 11/04/24  1818   INR 1.3*       Recent Labs   Lab 11/04/24  1530   WBC 10.62   HGB 10.3*   HCT 28.2*   MCV 76*   *      Recent Labs   Lab 11/05/24  0024   GLU 72      K 3.6   CL 95   CO2 19*   BUN 23   CREATININE 0.8   CALCIUM 8.8   MG 1.8   ALT 69*   *   ALBUMIN 2.2*   BILITOT 5.7*   BILIDIR 4.9*         Vitals:  Temp: 98.6 °F (37 °C) (11/05/24 1148)  Pulse: 104 (11/05/24 1148)  Resp: 16 (11/05/24 1148)  BP: 135/72 (11/05/24 1148)  SpO2: 97 % (11/05/24 1148)     Physical Exam:  ASA: III  Mallampati: II    General: no acute distress  Mental Status: alert and oriented to person, place and time  HEENT: normocephalic, atraumatic. Icterus.   Chest: unlabored breathing  Heart: regular heart rate  Abdomen: nondistended. Mild tenderness  Extremity: moves all extremities      ASSESSMENT/PLAN:     Sedation Plan: moderate sedation  Patient will undergo image guided targeted liver biopsy.      Gisell Spence MD  VIR

## 2024-11-05 NOTE — ASSESSMENT & PLAN NOTE
Colonic mass with liver masses / basilar lung masses concerning for metastatic malignancy, transaminitis  - High suspicion that abdominal pain/distention/nausea and transaminitis reflect colonic malignancy with metastatic disease.  - GI consulted; appreciate assistance. IR consult for biopsy today. NPO pending procedure; resume diet afterward.  - Continue symptomatic medications: morphine 2mg IV q6hr PRN, ondansetron 4mg IV q6hr PRN, prochlorperazine 5mg PO q6hr PRN.  - Contact palliative in addition.

## 2024-11-05 NOTE — ASSESSMENT & PLAN NOTE
Malnutrition Type:  Context: acute illness or injury  Level: moderate    Related to (etiology):   Altered GI function    Signs and Symptoms (as evidenced by):   Abdominal pain; 5% weight loss in 2 weeks; decreased appetite and early satiety     Malnutrition Characteristic Summary:  Weight Loss (Malnutrition): greater than 2% in 1 week (5% in 2 week)  Energy Intake (Malnutrition): less than 75% for greater than 7 days      Interventions/Recommendations (treatment strategy):  1) Continue regular diet as tolerated  2) Honor pt's preferences to encourage intake of meals  3) Monitor weight & labs  4) RD to monitor and follow up    Nutrition Diagnosis Status:   New

## 2024-11-05 NOTE — PROCEDURES
Pre Op Diagnosis: Liver lesion    Post Op Diagnosis: same    Procedure:  Imaged guided liver lesion biopsy    Procedure performed by: Gisell Spence MD      Written Informed Consent Obtained: Yes    Specimen Removed: Yes. Core liver samples    Estimated Blood Loss: Minimal    Findings:     Successful image-guided coaxial core biopsy of liver lesion using  17/18-G coaxial system.    6 core biopsy samples were sent to pathology.             Gisell Spence MD  VIR

## 2024-11-05 NOTE — H&P
[facility] Medicine  History & Physical    Patient Name: Tosin Ma  MRN: [id number]  Patient Class: OP- Observation  Admission Date: 11/4/2024  Attending Physician: SATYA Garcia MD   Primary Care Provider: Lake Cunningham MD         Patient information was obtained from patient, past medical records, and ER records.     Subjective:     Principal Problem:Abdominal pain    Chief Complaint:   Chief Complaint   Patient presents with    Abdominal Pain     Pt reporting RUQ pain x 1 month. Sent from PCP to rule out gallstones. Denies N/V/D. Pt reports noticing bright and dark red blood when having bowel movement yesterday.     abnormal labs     Pt sent to ED for elevated liver enzymes. Eyes appearing yellow upon assessment.         HPI: Tosin Ma is a 69-year-old female past medical history of breast cancer, diabetes, hypertension and hyperlipidemia sent in from her PCP for elevated LFTs. Patient reports about 1 month of abdominal pain and bloating that worsens with eating or drinking.  She also reports frequent bowel movements following eating without diarrhea or constipation.  Patient describes right upper quadrant pain that radiates to her back.  She also states she has started having some bright red blood in his stools. Denies any hematochezia or melena. She states the bright red blood in his stool is similar to when she had hemorrhoids.  Patient states she has had a decreased appetite with increased fatigue over the past few weeks.  She denies chest pain, shortness of breath, syncope, fever and chills.    ED lab work significant for elevated T bili of 6.7 with transaminitis with alk-phos of 830 with elevated AST of 159 and ALT of 80.  Albumin 2.3.  Potassium 2.0.  UA negative for UTI hemoglobin stable.  Platelets 652.  CT abdomen showed had descending colon 4 cm soft tissue mass concerning for primary malignancy.  Good hepatomegaly with multiple hypoattenuating hepatic masses  consistent with metastatic disease.  Patient received 1 L IV fluids in the ED.  ED provider spoke to GI on-call, who agreed consult and patient referred to Hospital Medicine.  Patient will be admitted for further evaluation management.    Past Medical History:   Diagnosis Date    Breast cancer     Right    Diabetes mellitus     Hypertension        Past Surgical History:   Procedure Laterality Date    BREAST BIOPSY Left 10/06/2017    BREAST BIOPSY Right 1996    BREAST LUMPECTOMY Right 1996    + CA     SECTION      , ,     COLONOSCOPY N/A 2019    Procedure: COLONOSCOPY;  Surgeon: Fredrick Bailey MD;  Location: [facility] ([address] [place] [address]);  Service: Endoscopy;  Laterality: N/A;    HYSTERECTOMY         Review of patient's allergies indicates:  No Known Allergies    No current facility-administered medications on file prior to encounter.     Current Outpatient Medications on File Prior to Encounter   Medication Sig    atorvastatin (LIPITOR) 20 MG tablet Take 1 tablet by mouth once daily.    dicyclomine (BENTYL) 10 MG capsule Take 1 capsule (10 mg total) by mouth before meals as needed (For abdominal pain/bloating).    glipiZIDE (GLUCOTROL) 5 MG TR24 Take 5 mg by mouth daily with breakfast.    hydrochlorothiazide (HYDRODIURIL) 25 MG tablet Take 25 mg by mouth once daily.    metFORMIN (GLUCOPHAGE-XR) 500 MG ER 24hr tablet Take 1 tablet (500 mg total) by mouth daily with breakfast.    nifedipine (PROCARDIA-XL) 60 MG (OSM) 24 hr tablet Take 60 mg by mouth once daily.    [DISCONTINUED] LANTUS SOLOSTAR 100 unit/mL (3 mL) InPn pen     [DISCONTINUED] metformin (GLUCOPHAGE) 500 MG tablet Take 500 mg by mouth daily with breakfast.    [DISCONTINUED] pioglitazone (ACTOS) 30 MG tablet Take 30 mg by mouth once daily.     Family History       Problem Relation (Age of Onset)    Breast cancer Sister (48), Cousin, Cousin (48), Daughter    Pancreatic cancer Mother          Tobacco Use    Smoking status: Never     Smokeless tobacco: Never   Substance and Sexual Activity    Alcohol use: No    Drug use: No    Sexual activity: Yes     Partners: Male     Birth control/protection: See Surgical Hx     Review of Systems   Constitutional:  Positive for appetite change and fatigue. Negative for activity change, chills and fever.   HENT:  Negative for congestion, sore throat and trouble swallowing.    Eyes:  Negative for photophobia and visual disturbance.   Respiratory:  Negative for cough, chest tightness and shortness of breath.    Cardiovascular:  Negative for chest pain, palpitations and leg swelling.   Gastrointestinal:  Positive for abdominal distention and abdominal pain. Negative for diarrhea and nausea.   Genitourinary:  Negative for dysuria, flank pain and hematuria.   Musculoskeletal:  Positive for back pain.   Neurological:  Negative for dizziness, weakness and headaches.   Psychiatric/Behavioral:  Negative for confusion.      Objective:     Vital Signs (Most Recent):  Temp: 97.8 °F (36.6 °C) (11/04/24 2030)  Pulse: 110 (11/04/24 2125)  Resp: 18 (11/04/24 2128)  BP: (!) 117/58 (11/04/24 2125)  SpO2: 99 % (11/04/24 2030) Vital Signs (24h Range):  Temp:  [97.8 °F (36.6 °C)-98.1 °F (36.7 °C)] 97.8 °F (36.6 °C)  Pulse:  [100-127] 110  Resp:  [17-19] 18  SpO2:  [97 %-99 %] 99 %  BP: (100-127)/(58-74) 117/58     Weight: 60.3 kg (133 lb)  Body mass index is 25.13 kg/m².     Physical Exam  Vitals reviewed.   Constitutional:       Appearance: Normal appearance. She is normal weight.   HENT:      Head: Normocephalic.      Mouth/Throat:      Mouth: Mucous membranes are moist.      Pharynx: Oropharynx is clear.   Eyes:      General: Lids are normal. Gaze aligned appropriately. Scleral icterus present.      Comments: Jaundiced sclera   Cardiovascular:      Rate and Rhythm: Regular rhythm. Tachycardia present.      Pulses: Normal pulses.      Heart sounds: Normal heart sounds.   Pulmonary:      Effort: Pulmonary effort is normal.       Breath sounds: Normal breath sounds.   Abdominal:      General: Bowel sounds are normal.      Palpations: Abdomen is soft. There is hepatomegaly.   Musculoskeletal:         General: Normal range of motion.      Cervical back: Normal range of motion.   Skin:     General: Skin is warm and dry.      Coloration: Skin is jaundiced.   Neurological:      Mental Status: She is alert and oriented to person, place, and time. Mental status is at baseline.   Psychiatric:         Mood and Affect: Mood normal.                Significant Labs: All pertinent labs within the past 24 hours have been reviewed.  CBC:   Recent Labs   Lab 11/04/24  1200 11/04/24  1530   WBC 10.54 10.62   HGB 10.5* 10.3*   HCT 29.4* 28.2*   * 652*     CMP:   Recent Labs   Lab 11/04/24  1200 11/04/24  1530    139   K 3.0* 2.8*   CL 92* 93*   CO2 20* 21*    73   BUN 27* 27*   CREATININE 0.9 1.0   CALCIUM 9.9 9.9   PROT 7.7 7.5   ALBUMIN 2.5* 2.3*   BILITOT 6.7* 6.4*   ALKPHOS 858* 830*   * 159*   ALT 80* 78*   ANIONGAP 24* 25*       Significant Imaging: I have reviewed all pertinent imaging results/findings within the past 24 hours.  .im  Assessment/Plan:     * Abdominal pain  Hepatic mass concerning for metastatic disease    Patient presented with abdominal pain, nausea and abdominal distention.  ALP 30, , ALT 78 CT abdomen showed Mid descending colon 4 cm soft tissue mass concerning for primary malignancy. Future follow-up is advised. 2. Hepatomegaly with multiple hypoattenuating hepatic masses consistent with metastatic disease.  Hepatomegaly, jaundice and scleral icterus present    -GI consulted, recommendations appreciated  -prn pain medication antiemetics  -PT INR and PTT pending  -hold atorvastatin    Essential hypertension  Patients blood pressure range in the last 24 hours was: BP  Min: 100/61  Max: 127/74.The patient's inpatient anti-hypertensive regimen is listed below:  Current  Antihypertensives  hydroCHLOROthiazide tablet 25 mg, Daily, Oral  NIFEdipine 24 hr tablet 60 mg, Daily, Oral    Plan  - BP is controlled, no changes needed to their regimen  -    Type 2 diabetes mellitus with hyperglycemia, without long-term current use of insulin  A1c 6.8 performed 11/2023    Accu-Cheks  Low-dose SSI  Hold metformin and glipizide      VTE Risk Mitigation (From admission, onward)           Ordered     Place sequential compression device  Until discontinued         11/04/24 1945     Reason for No Pharmacological VTE Prophylaxis  Once        Comments: Pending GI consult   Question:  Reasons:  Answer:  Risk of Bleeding    11/04/24 1945                         On 11/04/2024, patient should be placed in hospital observation services        Cyn Eli NP  Department of Hospital Medicine  [facility] ([place])

## 2024-11-05 NOTE — PROGRESS NOTES
[facility] Medicine  Progress Note    Patient Name: Tosin Ma  MRN: [id number]  Patient Class: OP- Observation   Admission Date: 11/4/2024  Length of Stay: 0 days  Attending Physician: SATYA Garcia MD  Primary Care Provider: Lake Cunningham MD        Subjective:     Principal Problem:Abdominal pain        HPI:  Tosin Ma is a 69-year-old female past medical history of breast cancer, diabetes, hypertension and hyperlipidemia sent in from her PCP for elevated LFTs. Patient reports about 1 month of abdominal pain and bloating that worsens with eating or drinking.  She also reports frequent bowel movements following eating without diarrhea or constipation.  Patient describes right upper quadrant pain that radiates to her back.  She also states she has started having some bright red blood in his stools. Denies any hematochezia or melena. She states the bright red blood in his stool is similar to when she had hemorrhoids.  Patient states she has had a decreased appetite with increased fatigue over the past few weeks.  She denies chest pain, shortness of breath, syncope, fever and chills.    ED lab work significant for elevated T bili of 6.7 with transaminitis with alk-phos of 830 with elevated AST of 159 and ALT of 80.  Albumin 2.3.  Potassium 2.0.  UA negative for UTI hemoglobin stable.  Platelets 652.  CT abdomen showed had descending colon 4 cm soft tissue mass concerning for primary malignancy.  Good hepatomegaly with multiple hypoattenuating hepatic masses consistent with metastatic disease.  Patient received 1 L IV fluids in the ED.  ED provider spoke to GI on-call, who agreed consult and patient referred to Hospital Medicine.  Patient will be admitted for further evaluation management.    Overview/Hospital Course:  No notes on file    Interval History: No acute events overnight. Extensive discussion of plan of care with patient /  at bedside. No other concerns at this  time.    Review of Systems   Constitutional:  Negative for chills and fever.   Respiratory:  Negative for cough and shortness of breath.    Cardiovascular:  Negative for chest pain and palpitations.   Gastrointestinal:  Positive for abdominal pain. Negative for nausea and vomiting.     Objective:     Vital Signs (Most Recent):  Temp: 98 °F (36.7 °C) (11/05/24 1557)  Pulse: 99 (11/05/24 1557)  Resp: 16 (11/05/24 1557)  BP: (!) 111/58 (11/05/24 1557)  SpO2: 97 % (11/05/24 1557) Vital Signs (24h Range):  Temp:  [97.6 °F (36.4 °C)-98.6 °F (37 °C)] 98 °F (36.7 °C)  Pulse:  [] 99  Resp:  [16-19] 16  SpO2:  [95 %-99 %] 97 %  BP: (100-135)/(56-74) 111/58     Weight: 60.4 kg (133 lb 2.5 oz)  Body mass index is 25.16 kg/m².    Intake/Output Summary (Last 24 hours) at 11/5/2024 1623  Last data filed at 11/5/2024 1251  Gross per 24 hour   Intake 400 ml   Output 350 ml   Net 50 ml         Physical Exam  Vitals and nursing note reviewed.   Constitutional:       General: She is not in acute distress.     Appearance: She is well-developed.   HENT:      Head: Normocephalic and atraumatic.   Eyes:      General: Scleral icterus present.         Right eye: No discharge.         Left eye: No discharge.      Conjunctiva/sclera: Conjunctivae normal.   Cardiovascular:      Rate and Rhythm: Normal rate.      Pulses: Normal pulses.   Pulmonary:      Effort: Pulmonary effort is normal. No respiratory distress.   Abdominal:      Palpations: Abdomen is soft.      Tenderness: There is abdominal tenderness (RUQ).   Musculoskeletal:         General: Normal range of motion.      Right lower leg: No edema.      Left lower leg: No edema.   Skin:     General: Skin is warm and dry.      Coloration: Skin is jaundiced.   Neurological:      Mental Status: She is alert and oriented to person, place, and time.         Significant Labs:   CBC:  Recent Labs   Lab 11/04/24  1200 11/04/24  1530   WBC 10.54 10.62   HGB 10.5* 10.3*   HCT 29.4* 28.2*   PLT  629* 652*   GRAN 81.2*  8.6* 80.8*  8.6*   LYMPH 9.2*  1.0 8.9*  0.9*   MONO 8.0  0.8 8.9  1.0   EOS 0.1 0.1   BASO 0.05 0.05   CMP:  Recent Labs   Lab 11/04/24  1200 11/04/24  1530 11/05/24  0024    139 136   K 3.0* 2.8* 3.6   CL 92* 93* 95   CO2 20* 21* 19*   BUN 27* 27* 23   CREATININE 0.9 1.0 0.8    73 72   CALCIUM 9.9 9.9 8.8   MG  --   --  1.8   PHOS  --   --  2.3*   ALKPHOS 858* 830* 720*   * 159* 150*   ALT 80* 78* 69*   BILITOT 6.7* 6.4* 5.7*   PROT 7.7 7.5 6.7   ALBUMIN 2.5* 2.3* 2.2*   ANIONGAP 24* 25* 22*      Significant Imaging: I have reviewed and interpreted all pertinent imaging results/findings within the past 24 hours.      Assessment/Plan:      * Abdominal pain  Colonic mass with liver masses / basilar lung masses concerning for metastatic malignancy, transaminitis  - High suspicion that abdominal pain/distention/nausea and transaminitis reflect colonic malignancy with metastatic disease.  - GI consulted; appreciate assistance. IR consult for biopsy today. NPO pending procedure; resume diet afterward.  - Continue symptomatic medications: morphine 2mg IV q6hr PRN, ondansetron 4mg IV q6hr PRN, prochlorperazine 5mg PO q6hr PRN.  - Contact palliative in addition.    Essential hypertension  - Continue HCTZ 25mg PO daily, nifedipine 60mg PO daily.    Type 2 diabetes mellitus with hyperglycemia, without long-term current use of insulin  - Repeat A1c 4.8.  - Monitor glucoses with AM labs.      VTE Risk Mitigation (From admission, onward)           Ordered     Place sequential compression device  Until discontinued         11/04/24 1945     Reason for No Pharmacological VTE Prophylaxis  Once        Comments: Pending GI consult   Question:  Reasons:  Answer:  Risk of Bleeding    11/04/24 1945                    Discharge Planning   LUKE: 11/6/2024     Code Status: Full Code   Is the patient medically ready for discharge?:     Reason for patient still in hospital (select all that  apply): Treatment  Discharge Plan A: Home with family                  D Vincent Garcia MD  Department of Hospital Medicine   [facility] ([place])

## 2024-11-05 NOTE — PLAN OF CARE
Case Management Assessment     PCP: Dr Lake Cunningham    Patient Arrived From: Home  Existing Help at Home: Self, Independent; AnilDylan (Spouse)  [phone] (Home Phone)     Barriers to Discharge: None    Discharge Plan:    A. Home w/family   B. Home    Patient AAOx3, independent at baseline. PCP correct on facesheet. Denies owning any DME. Family to provide transportation home.     11/05/24 1452   Discharge Assessment   Assessment Type Discharge Planning Assessment   Confirmed/corrected address, phone number and insurance Yes   Confirmed Demographics Correct on Facesheet   Source of Information patient   Communicated LUKE with patient/caregiver Date not available/Unable to determine   People in Home spouse   Do you expect to return to your current living situation? Yes   Do you have help at home or someone to help you manage your care at home? Yes   Who are your caregiver(s) and their phone number(s)? Dylan Ma (Spouse)  [phone] (Home Phone)   Prior to hospitilization cognitive status: Alert/Oriented   Current cognitive status: Alert/Oriented   Walking or Climbing Stairs Difficulty no   Dressing/Bathing Difficulty no   Equipment Currently Used at Home none   Readmission within 30 days? No   Do you currently have service(s) that help you manage your care at home? No   Do you take prescription medications? Yes   Do you have prescription coverage? Yes   Do you have any problems affording any of your prescribed medications? No   Is the patient taking medications as prescribed? yes   Who is going to help you get home at discharge? Dylan Ma (Spouse)  [phone] (Home Phone)   How do you get to doctors appointments? car, drives self   Are you on dialysis? No   Do you take coumadin? No   Discharge Plan A Home with family   Discharge Plan B Home   DME Needed Upon Discharge  none   Discharge Plan discussed with: Patient   Transition of Care Barriers None     [facility]  Initial Discharge  Assessment       Primary Care Provider: Lake Cunningham MD    Admission Diagnosis: Hypokalemia [E87.6]  Tachycardia [R00.0]  Serum total bilirubin elevated [R17]  Elevated LFTs [R79.89]  Chest pain [R07.9]  Metastatic malignant neoplasm, unspecified site [C79.9]    Admission Date: 11/4/2024  Expected Discharge Date: 11/6/2024    Transition of Care Barriers: (P) None    Payor: BLUE CROSS BLUE SHIELD / Plan: [place] HMO / Product Type: HMO /     Extended Emergency Contact Information  Primary Emergency Contact: Dylan Ma   [facility]  Home Phone: [phone]  Relation: Spouse    Discharge Plan A: (P) Home with family  Discharge Plan B: (P) Home      [facility] [place] [address] [address] [address]  Phone: [phone] Fax: [phone]    [employer] #[id number] [address] AT [address] [address]  Phone: [phone] Fax: [phone]      Initial Assessment (most recent)       Adult Discharge Assessment - 11/05/24 1452          Discharge Assessment    Assessment Type Discharge Planning Assessment (P)      Confirmed/corrected address, phone number and insurance Yes (P)      Confirmed Demographics Correct on Facesheet (P)      Source of Information patient (P)      Communicated LUKE with patient/caregiver Date not available/Unable to determine (P)      People in Home spouse (P)      Do you expect to return to your current living situation? Yes (P)      Do you have help at home or someone to help you manage your care at home? Yes (P)      Who are your caregiver(s) and their phone number(s)? Dylan Ma (Spouse)  [phone] (Home Phone) (P)      Prior to hospitilization cognitive status: Alert/Oriented (P)      Current cognitive status: Alert/Oriented (P)      Walking or Climbing Stairs Difficulty no (P)      Dressing/Bathing  Difficulty no (P)      Equipment Currently Used at Home none (P)      Readmission within 30 days? No (P)      Do you currently have service(s) that help you manage your care at home? No (P)      Do you take prescription medications? Yes (P)      Do you have prescription coverage? Yes (P)      Do you have any problems affording any of your prescribed medications? No (P)      Is the patient taking medications as prescribed? yes (P)      Who is going to help you get home at discharge? Dylan Ma (Spouse)  [phone] (Home Phone) (P)      How do you get to doctors appointments? car, drives self (P)      Are you on dialysis? No (P)      Do you take coumadin? No (P)      Discharge Plan A Home with family (P)      Discharge Plan B Home (P)      DME Needed Upon Discharge  none (P)      Discharge Plan discussed with: Patient (P)      Transition of Care Barriers None (P)

## 2024-11-05 NOTE — PLAN OF CARE
Recommendations  1) Continue regular diet as tolerated    2) Honor pt's preferences to encourage intake of meals    3) Monitor weight & labs    4) RD to monitor and follow up    Goals: Pt will be able to tolerate >75% intake of meals by RD follow up  Nutrition Goal Status: new  Communication of RD Recs:  (POC)

## 2024-11-06 VITALS
HEIGHT: 61 IN | RESPIRATION RATE: 18 BRPM | OXYGEN SATURATION: 95 % | HEART RATE: 94 BPM | TEMPERATURE: 98 F | BODY MASS INDEX: 25.14 KG/M2 | DIASTOLIC BLOOD PRESSURE: 78 MMHG | WEIGHT: 133.19 LBS | SYSTOLIC BLOOD PRESSURE: 124 MMHG

## 2024-11-06 LAB
ALBUMIN SERPL BCP-MCNC: 2.1 G/DL (ref 3.5–5.2)
ALP SERPL-CCNC: 922 U/L (ref 40–150)
ALT SERPL W/O P-5'-P-CCNC: 71 U/L (ref 10–44)
ANION GAP SERPL CALC-SCNC: 19 MMOL/L (ref 8–16)
AST SERPL-CCNC: 160 U/L (ref 10–40)
BILIRUB SERPL-MCNC: 6.1 MG/DL (ref 0.1–1)
BUN SERPL-MCNC: 17 MG/DL (ref 8–23)
CALCIUM SERPL-MCNC: 9 MG/DL (ref 8.7–10.5)
CHLORIDE SERPL-SCNC: 95 MMOL/L (ref 95–110)
CO2 SERPL-SCNC: 22 MMOL/L (ref 23–29)
CREAT SERPL-MCNC: 0.7 MG/DL (ref 0.5–1.4)
EST. GFR  (NO RACE VARIABLE): >60 ML/MIN/1.73 M^2
GLUCOSE SERPL-MCNC: 46 MG/DL (ref 70–110)
MAGNESIUM SERPL-MCNC: 1.6 MG/DL (ref 1.6–2.6)
PHOSPHATE SERPL-MCNC: 2.4 MG/DL (ref 2.7–4.5)
POCT GLUCOSE: 78 MG/DL (ref 70–110)
POCT GLUCOSE: 89 MG/DL (ref 70–110)
POTASSIUM SERPL-SCNC: 3.6 MMOL/L (ref 3.5–5.1)
PROT SERPL-MCNC: 6.7 G/DL (ref 6–8.4)
SODIUM SERPL-SCNC: 136 MMOL/L (ref 136–145)

## 2024-11-06 PROCEDURE — 80053 COMPREHEN METABOLIC PANEL: CPT | Performed by: INTERNAL MEDICINE

## 2024-11-06 PROCEDURE — G0378 HOSPITAL OBSERVATION PER HR: HCPCS

## 2024-11-06 PROCEDURE — 84100 ASSAY OF PHOSPHORUS: CPT | Performed by: INTERNAL MEDICINE

## 2024-11-06 PROCEDURE — 25000003 PHARM REV CODE 250: Performed by: NURSE PRACTITIONER

## 2024-11-06 PROCEDURE — 94761 N-INVAS EAR/PLS OXIMETRY MLT: CPT

## 2024-11-06 PROCEDURE — 83735 ASSAY OF MAGNESIUM: CPT | Performed by: INTERNAL MEDICINE

## 2024-11-06 PROCEDURE — 36415 COLL VENOUS BLD VENIPUNCTURE: CPT | Performed by: INTERNAL MEDICINE

## 2024-11-06 RX ORDER — POLYETHYLENE GLYCOL 3350 17 G/17G
17 POWDER, FOR SOLUTION ORAL DAILY
Qty: 30 EACH | Refills: 0 | Status: ON HOLD | OUTPATIENT
Start: 2024-11-06

## 2024-11-06 RX ORDER — IBUPROFEN 200 MG
16 TABLET ORAL
Status: DISCONTINUED | OUTPATIENT
Start: 2024-11-06 | End: 2024-11-06 | Stop reason: HOSPADM

## 2024-11-06 RX ORDER — HYDROCODONE BITARTRATE AND ACETAMINOPHEN 5; 325 MG/1; MG/1
1 TABLET ORAL EVERY 6 HOURS PRN
Qty: 21 TABLET | Refills: 0 | Status: ON HOLD | OUTPATIENT
Start: 2024-11-06

## 2024-11-06 RX ORDER — GLUCAGON 1 MG
1 KIT INJECTION
Status: DISCONTINUED | OUTPATIENT
Start: 2024-11-06 | End: 2024-11-06 | Stop reason: HOSPADM

## 2024-11-06 RX ORDER — ONDANSETRON 4 MG/1
4 TABLET, ORALLY DISINTEGRATING ORAL EVERY 6 HOURS PRN
Qty: 30 TABLET | Refills: 1 | Status: ON HOLD | OUTPATIENT
Start: 2024-11-06

## 2024-11-06 RX ORDER — IBUPROFEN 200 MG
24 TABLET ORAL
Status: DISCONTINUED | OUTPATIENT
Start: 2024-11-06 | End: 2024-11-06 | Stop reason: HOSPADM

## 2024-11-06 RX ADMIN — NIFEDIPINE 60 MG: 30 TABLET, FILM COATED, EXTENDED RELEASE ORAL at 08:11

## 2024-11-06 RX ADMIN — HYDROCHLOROTHIAZIDE 25 MG: 25 TABLET ORAL at 08:11

## 2024-11-06 NOTE — PLAN OF CARE
[facility] ([place])  Discharge Final Note    Primary Care Provider: Lake Cunningham MD    Expected Discharge Date: 11/6/2024    Final Discharge Note (most recent)       Final Note - 11/06/24 1029          Final Note    Assessment Type Final Discharge Note (P)      Anticipated Discharge Disposition Home or Self Care (P)      Hospital Resources/Appts/Education Provided Provided patient/caregiver with written discharge plan information;Appointments scheduled and added to AVS (P)         Post-Acute Status    Discharge Delays None known at this time (P)                      Important Message from Medicare             Contact Info       Lake Cunningham MD   Specialty: Family Medicine   Relationship: PCP - General    [address]  [address]  [facility] [address]   Phone: [phone]       Next Steps: Follow up in 2 week(s)    Instructions: post-hospital follow-up    [facility]   Specialty: Palliative Medicine    [facility]  [place] [address]   Phone: [phone]       Next Steps: Schedule an appointment as soon as possible for a visit    Instructions: post-hospital follow-up    [facility]   Specialty: Hematology and Oncology    [address]  [place] [address]   Phone: [phone]       Next Steps: Follow up in 2 week(s)    Instructions: post-hospital follow-up          Patient will discharge home with family.     Patient family will provide transportation home.     Patient appts are made & on the AVS.     Patient has no discharge needs/orders from SW/CM standpoint.

## 2024-11-06 NOTE — PLAN OF CARE
Pt remained free of falls and injuries throughout shift. AAOx4. Pt calm and cooperative. Purposeful hourly rounding performed. Pt swallows meds whole. IV flushed and saline locked. Pt c/o R sided back pain, one time dose Tramadol given per NP, pt noted full relief. Patient ambulates independently. CBG 46 this AM on labs. See previous notes. CBG 78 on recheck. VSS on room air. Pt sitting up in bed awaiting breakfast denies needs at this time, NADN. Bed low and locked, call light in reach. Side rails up x2. Report given to oncoming nurse.

## 2024-11-06 NOTE — NURSING
IV removed with catheter intact, telemetry box removed. Patient received DC teaching via RN at bedside, went over her appointments and let her know that the oncologist would be reaching out to her to schedule her followup and I told her that if she did not receive a call by Friday, to give them a call herself. Pt left floor via transport with family and all belongings

## 2024-11-06 NOTE — PROGRESS NOTES
Received critical Blood glucose level of 46 this AM from lab. Pt given 2 juices with extra sugar packets added, fruit cup and anand crackers. She is eating them eagerly. Will recheck. Dr. Garcia notified.

## 2024-11-07 NOTE — DISCHARGE SUMMARY
[facility]  Discharge Summary      Patient Name: Tosin Ma  MRN: [id number]  TREY: [id number]  Patient Class: OP- Observation  Admission Date: 11/4/2024  Hospital Length of Stay: 0 days  Discharge Date and Time: 11/6/2024 12:06 PM  Attending Physician: Sherry att. providers found   Discharging Provider: JANELL Garcia MD  Primary Care Provider: Lake Cunningham MD    Primary Care Team: Networked reference to record PCT     HPI:   Tosin Ma is a 69-year-old female past medical history of breast cancer, diabetes, hypertension and hyperlipidemia sent in from her PCP for elevated LFTs. Patient reports about 1 month of abdominal pain and bloating that worsens with eating or drinking.  She also reports frequent bowel movements following eating without diarrhea or constipation.  Patient describes right upper quadrant pain that radiates to her back.  She also states she has started having some bright red blood in his stools. Denies any hematochezia or melena. She states the bright red blood in his stool is similar to when she had hemorrhoids.  Patient states she has had a decreased appetite with increased fatigue over the past few weeks.  She denies chest pain, shortness of breath, syncope, fever and chills.    ED lab work significant for elevated T bili of 6.7 with transaminitis with alk-phos of 830 with elevated AST of 159 and ALT of 80.  Albumin 2.3.  Potassium 2.0.  UA negative for UTI hemoglobin stable.  Platelets 652.  CT abdomen showed had descending colon 4 cm soft tissue mass concerning for primary malignancy.  Good hepatomegaly with multiple hypoattenuating hepatic masses consistent with metastatic disease.  Patient received 1 L IV fluids in the ED.  ED provider spoke to GI on-call, who agreed consult and patient referred to Hospital Medicine.  Patient will be admitted for further evaluation management.    Procedure(s) (LRB):  BIOPSY, LIVER, WITH US GUIDANCE (N/A)       Hospital Course:   Placed in observation with transaminitis and imaging demonstrating 4cm colonic mass with concurrent liver and pulmonary nodules highly suspicious for metastatic disease. Imaging did not demonstrate biliary ductal dilation that would be amenable to stenting or drain placement. GI consulted as well as IR. Biopsy performed of liver nodule. Referred for oncology / colorectal as well as palliative for close follow-up. Symptoms controlled. With clinical improvement and vital stability she was prepared for discharge home with close outpatient follow-up for pathology results and potential treatment options.     Goals of Care Treatment Preferences:  Code Status: Full Code      SDOH Screening:  The patient was screened for utility difficulties, food insecurity, transport difficulties, housing insecurity, and interpersonal safety and there were no concerns identified this admission.     Consults:   Consults (From admission, onward)          Status Ordering Provider     Inpatient consult to Palliative Care  Once        Provider:  (Not yet assigned)    Completed SATYA FRIEDMAN     Inpatient consult to Interventional Radiology  Once        Provider:  Young Thomas MD    Completed VANESA HA     Inpatient consult to Registered Dietitian/Nutritionist  Once        Provider:  (Not yet assigned)    Completed SATYA FRIEDMAN     Inpatient consult to Social Work/Case Management  Once        Provider:  (Not yet assigned)    Completed SATYA FRIEDMAN     Inpatient consult to Gastroenterology  Once        Provider:  Augie Alan MD    Completed SARAH DOW            No new Assessment & Plan notes have been filed under this hospital service since the last note was generated.  Service: Hospital Medicine    Final Active Diagnoses:    Diagnosis Date Noted POA    PRINCIPAL PROBLEM:  Abdominal pain [R10.9] 11/04/2024 Yes    Liver mass [R16.0] 11/05/2024 Yes    Liver lesion [K76.9] 11/05/2024  Yes    Elevated LFTs [R79.89] 11/05/2024 Yes    Serum total bilirubin elevated [R17] 11/05/2024 Yes    Moderate malnutrition [E44.0] 11/05/2024 Yes    Type 2 diabetes mellitus with hyperglycemia, without long-term current use of insulin [E11.65] 11/04/2024 Yes    Essential hypertension [I10] 09/03/2020 Yes      Problems Resolved During this Admission:       Discharged Condition: fair    Disposition: Home or Self Care    Follow Up:   Follow-up Information       Lake Cunnignham MD Follow up in 2 week(s).    Specialty: Family Medicine  Why: post-hospital follow-up  Contact information:  [address] [facility]  [address]  [facility] [address]  [phone]               [facility]. Schedule an appointment as soon as possible for a visit.    Specialty: Palliative Medicine  Why: post-hospital follow-up  Contact information:  [address]  [facility] [address] [phone]  Additional information:  Please park in the [facility] surface lot on the [address] side. Check in on the 3rd floor.   If your appointment is with [facility], please check in on 5th floor.              [facility] Follow up in 2 week(s).    Specialty: Hematology and Oncology  Why: post-hospital follow-up  Contact information:  [address]  [facility] [address] [phone]  Additional information:  Please park in the [facility] surface lot on the [address] side. Check in on the 3rd floor.   If your appointment is after 5pm, please proceed to the 3rd floor and go to the multipurpose room.                         Patient Instructions:      Ambulatory referral/consult to CLINIC Palliative Care   Standing Status: Future   Referral Priority: Routine Referral Type: Consultation   Requested Specialty: Palliative Medicine   Number of Visits Requested: 1     Ambulatory referral/consult to Hematology / Oncology   Standing Status: Future   Referral Priority:  Urgent Referral Type: Consultation   Referral Reason: Specialty Services Required   Requested Specialty: Hematology and Oncology   Number of Visits Requested: 1     Diet Adult Regular     Notify your health care provider if you experience any of the following:  persistent nausea and vomiting or diarrhea     Notify your health care provider if you experience any of the following:  severe uncontrolled pain     Notify your health care provider if you experience any of the following:  temperature >100.4     Notify your health care provider if you experience any of the following:  increased confusion or weakness     Notify your health care provider if you experience any of the following:  persistent dizziness, light-headedness, or visual disturbances     Notify your health care provider if you experience any of the following:  difficulty breathing or increased cough     Activity as tolerated       Significant Diagnostic Studies:   CBC:  Recent Labs   Lab 11/04/24  1200 11/04/24  1530   WBC 10.54 10.62   HGB 10.5* 10.3*   HCT 29.4* 28.2*   * 652*   GRAN 81.2*  8.6* 80.8*  8.6*   LYMPH 9.2*  1.0 8.9*  0.9*   MONO 8.0  0.8 8.9  1.0   EOS 0.1 0.1   BASO 0.05 0.05     BMP:  Recent Labs   Lab 11/04/24  1530 11/05/24  0024 11/06/24  0511    136 136   K 2.8* 3.6 3.6   CL 93* 95 95   CO2 21* 19* 22*   BUN 27* 23 17   CREATININE 1.0 0.8 0.7   GLU 73 72 46*   CALCIUM 9.9 8.8 9.0   MG  --  1.8 1.6   PHOS  --  2.3* 2.4*     CMP:  Recent Labs   Lab 11/04/24  1530 11/05/24  0024 11/06/24  0511    136 136   K 2.8* 3.6 3.6   CL 93* 95 95   CO2 21* 19* 22*   BUN 27* 23 17   CREATININE 1.0 0.8 0.7   GLU 73 72 46*   CALCIUM 9.9 8.8 9.0   MG  --  1.8 1.6   PHOS  --  2.3* 2.4*   ALKPHOS 830* 720* 922*   * 150* 160*   ALT 78* 69* 71*   BILITOT 6.4* 5.7* 6.1*   PROT 7.5 6.7 6.7   ALBUMIN 2.3* 2.2* 2.1*   ANIONGAP 25* 22* 19*     Imaging Results               CT Abdomen Pelvis With IV Contrast NO Oral Contrast  (Final result)  Result time 11/04/24 17:49:30      Final result by Andreea Young MD (11/04/24 17:49:30)                   Impression:      1. Mid descending colon 4 cm soft tissue mass concerning for primary malignancy.  Future follow-up is advised.  2. Hepatomegaly with multiple hypoattenuating hepatic masses consistent with metastatic disease given aforementioned findings.  3. Multiple small pulmonary nodules also concerning for metastatic disease.  4. Postsurgical changes and additional findings as detailed above.  This report was flagged in Epic as abnormal.      Electronically signed by: Andreea Young MD  Date:    11/04/2024  Time:    17:49               Narrative:    EXAMINATION:  CT ABDOMEN PELVIS WITH IV CONTRAST    CLINICAL HISTORY:  Abdominal pain, acute, nonlocalized;    TECHNIQUE:  Low dose axial images, sagittal and coronal reformations were obtained from the lung bases to the pubic symphysis following the IV administration of 75 mL of Omnipaque 350 .  Oral contrast was not given.    COMPARISON:  Abdominal ultrasound from the same date.    FINDINGS:  The visualized portion of the heart is unremarkable.  Several small scattered pulmonary nodules are seen, the largest of which measures 6 mm in the right lower lobe.  Lung bases show no consolidation or pleural effusion.    Liver is enlarged measuring 22 cm with multiple scattered ill-defined hypoattenuating masses.  These range up to 5 cm in size in the right hepatic lobe.  There is no intra-or extrahepatic biliary ductal dilatation.  The gallbladder is contracted.  Portal vein and splenic vein are patent.  There is a small gastric diverticulum projecting posteriorly from the fundal region.  Stomach is otherwise normal in appearance.  Pancreas, spleen, and adrenal glands are unremarkable.    Kidneys enhance normally with no evidence of hydronephrosis.  Two nonobstructing right renal stones are visualized, the larger of which measures 0.9 cm.  No  abnormalities are seen along the ureteral courses.  Urinary bladder is unremarkable.  Uterus has been removed.    Appendix is visualized and is unremarkable.  No evidence of bowel obstruction.  Few scattered colonic diverticula are seen.  There is a 4 cm soft tissue mass visualized within the mid descending colon.  No free air or free fluid.    Aorta tapers normally.    No acute osseous abnormality identified. Small fat containing umbilical hernia is noted.                                        US Abdomen Limited (Final result)  Result time 11/04/24 16:58:14      Final result by Erika Larios MD (11/04/24 16:58:14)                   Impression:      Hepatomegaly with innumerable liver masses.  At this point, I am concerned for metastatic disease.  Recommend CT abdomen and pelvis with IV contrast, which according to the electronic medical record has been ordered.    Gallbladder is mostly contracted with no stones identified.    This report was flagged in Epic as abnormal.      Electronically signed by: Erika Larios  Date:    11/04/2024  Time:    16:58               Narrative:    EXAMINATION:  US ABDOMEN LIMITED    CLINICAL HISTORY:  ruq ttp;    TECHNIQUE:  Limited ultrasound of the right upper quadrant of the abdomen (including pancreas, liver, gallbladder, common bile duct, and spleen) was performed.    COMPARISON:  None.    FINDINGS:  Liver: Enlarged measuring 19.7 cm. Heterogeneous echotexture.  Innumerable masses essentially replacing the liver parenchyma.    Gallbladder: Mostly contracted.  No stones.    Biliary system: The common duct is not dilated, measuring 2 mm.  No intrahepatic ductal dilatation.    Spleen: Normal in size and echotexture, measuring 9.9 cm.    Miscellaneous: No upper abdominal ascites.                                      Pending Diagnostic Studies:       Procedure Component Value Units Date/Time    Specimen to Pathology, Surgery Liver [[id number]] Collected: 11/05/24 1436    Order  Status: Sent Lab Status: In process Updated: 11/05/24 9041    Specimen: Tissue            Medications:  Reconciled Home Medications:      Medication List        START taking these medications      HYDROcodone-acetaminophen 5-325 mg per tablet  Commonly known as: NORCO  Take 1 tablet by mouth every 6 (six) hours as needed for Pain.     ondansetron 4 MG Tbdl  Commonly known as: ZOFRAN-ODT  Take 1 tablet (4 mg total) by mouth every 6 (six) hours as needed (nausea / vomiting).     polyethylene glycol 17 gram Pwpk  Commonly known as: GLYCOLAX  Take 17 g by mouth once daily.            CONTINUE taking these medications      dicyclomine 10 MG capsule  Commonly known as: BENTYL  Take 1 capsule (10 mg total) by mouth before meals as needed (For abdominal pain/bloating).     hydroCHLOROthiazide 25 MG tablet  Commonly known as: HYDRODIURIL  Take 25 mg by mouth once daily.     NIFEdipine 60 MG (OSM) 24 hr tablet  Commonly known as: PROCARDIA-XL  Take 60 mg by mouth once daily.            STOP taking these medications      atorvastatin 20 MG tablet  Commonly known as: LIPITOR     glipiZIDE 5 MG Tr24     metFORMIN 500 MG ER 24hr tablet  Commonly known as: GLUCOPHAGE-XR              Indwelling Lines/Drains at time of discharge:   Lines/Drains/Airways       None                   Time spent on the discharge of patient: 35 minutes         D Vincent Garcia MD  Department of Hospital Medicine  [facility] [place] [facility])

## 2024-11-07 NOTE — HOSPITAL COURSE
Placed in observation with transaminitis and imaging demonstrating 4cm colonic mass with concurrent liver and pulmonary nodules highly suspicious for metastatic disease. Imaging did not demonstrate biliary ductal dilation that would be amenable to stenting or drain placement. GI consulted as well as IR. Biopsy performed of liver nodule. Referred for oncology / colorectal as well as palliative for close follow-up. Symptoms controlled. With clinical improvement and vital stability she was prepared for discharge home with close outpatient follow-up for pathology results and potential treatment options.

## 2024-11-08 LAB
ADEQUACY: NORMAL
FINAL PATHOLOGIC DIAGNOSIS: NORMAL
GROSS: NORMAL
Lab: NORMAL

## 2024-11-11 ENCOUNTER — PATIENT MESSAGE (OUTPATIENT)
Dept: HEMATOLOGY/ONCOLOGY | Facility: CLINIC | Age: 69
End: 2024-11-11
Payer: COMMERCIAL

## 2024-11-11 ENCOUNTER — DOCUMENTATION ONLY (OUTPATIENT)
Dept: HEMATOLOGY/ONCOLOGY | Facility: CLINIC | Age: 69
End: 2024-11-11
Payer: COMMERCIAL

## 2024-11-11 DIAGNOSIS — C22.9 ADENOCARCINOMA OF LIVER: Primary | ICD-10-CM

## 2024-11-11 NOTE — PROGRESS NOTES
LISYM pt to inform her of her upcoming 11/19/24 appt with Dr Manriquez. I reviewed date location and time, provided my direct contact information and encouraged them to call for any assistance.  Pt verbalized understanding.  Oncology Navigation   Intake  Date of Diagnosis: 11/05/24  Cancer Type: GI  Type of Referral: Internal  Date of Referral: 11/05/24  Initial Nurse Navigator Contact: 11/11/24  Referral to Initial Contact Timeline (days): 6  First Appointment Available: 11/19/24  Appointment Date: 11/19/24  First Available Date vs. Scheduled Date (days): 0     Treatment                              Acuity      Follow Up  No follow-ups on file.

## 2024-11-12 ENCOUNTER — PATIENT MESSAGE (OUTPATIENT)
Dept: HEMATOLOGY/ONCOLOGY | Facility: CLINIC | Age: 69
End: 2024-11-12
Payer: COMMERCIAL

## 2024-11-12 DIAGNOSIS — C80.1 ADENOCARCINOMA: Primary | ICD-10-CM

## 2024-11-13 ENCOUNTER — NURSE TRIAGE (OUTPATIENT)
Dept: ADMINISTRATIVE | Facility: CLINIC | Age: 69
End: 2024-11-13
Payer: COMMERCIAL

## 2024-11-13 NOTE — TELEPHONE ENCOUNTER
Spoke with pt who reports that she has been having rectal bleeding since 10/30. States she was admitted to hospital due to mass on colon, and spots on liver.  States that she does have blood mixed in stool, and blood noted to be in toilet water as well. Pt advised to be seen in ED. Asking if Dr. Garcia can be notified states she was told to call him if she needed anything. States that she does have upcoming appointment with Dr. Ryan. Advised will send message to providers. Advised if no call from provider then to proceed to ED. Verbalized understanding.     Reason for Disposition   MODERATE rectal bleeding (small blood clots, passing blood without stool, or toilet water turns red) more than once a day    Additional Information   Negative: Passed out (e.g., fainted, lost consciousness, blacked out and was not responding)   Negative: Shock suspected (e.g., cold/pale/clammy skin, too weak to stand, low BP, rapid pulse)   Negative: Vomiting red blood or black (coffee ground) material   Negative: Sounds like a life-threatening emergency to the triager   Negative: SEVERE rectal bleeding (large blood clots; constant or on and off bleeding)   Negative: SEVERE dizziness (e.g., unable to stand, requires support to walk, feels like passing out now)    Protocols used: Rectal Bleeding-A-OH

## 2024-11-14 DIAGNOSIS — C18.9 COLON ADENOCARCINOMA: Primary | ICD-10-CM

## 2024-11-14 NOTE — PROGRESS NOTES
The patient location is: [facility] med onc clinic  The chief complaint leading to consultation is: cancer    Visit type: audiovisual    Face to Face time with patient: 40 min  80 minutes of total time spent on the encounter, which includes face to face time and non-face to face time preparing to see the patient (eg, review of tests), Obtaining and/or reviewing separately obtained history, Documenting clinical information in the electronic or other health record, Independently interpreting results (not separately reported) and communicating results to the patient/family/caregiver, or Care coordination (not separately reported).         Each patient to whom he or she provides medical services by telemedicine is:  (1) informed of the relationship between the physician and patient and the respective role of any other health care provider with respect to management of the patient; and (2) notified that he or she may decline to receive medical services by telemedicine and may withdraw from such care at any time.    Notes:     CC:  Stage IV descending colon cancer. MMR-proficient    HPI:  Ms Ma is a 70 yo woman with HTN, HLD, history of breast cancer, T2DM. She presented with abdominal pain and bloating worsened with eating or drinking, along with frequent bowel movements after eating. RUQ pain radiating to the back. She was noted to have elevated LFT and was sent to ED. Bilirubin was noted to be 6.7, alk phos 830, , ALT 80. CT abdomen/pelvis on 11/4/24 showed Mid descending colon 4 cm soft tissue mass concerning for primary malignancy. Hepatomegaly with multiple hypoattenuating hepatic masses consistent with metastatic disease given aforementioned findings. Multiple small pulmonary nodules also concerning for metastatic disease. Postsurgical changes and additional findings as detailed above. liver biopsy on 11/5/2024 showed adenocarcinoma with mucinous features. Positive for CDX2, CK20, CK7, negative for TTF1,  ER, PAX8, GATA3. Primary site include GI tract, pancreatobiliary tract and among others. MMR-proficient. Tempus tissue pending. Patient presents today for further management. Feeling poorly. +anorexia. Throws up after she eats. Can only tolerate ice cream. +weight loss. +abdominal pain. +jaundice. +weakness.     ECO    Past Medical History:   Diagnosis Date    Breast cancer     Right    Diabetes mellitus     Hypertension     Liver lesion 2024        Past Surgical History:   Procedure Laterality Date    BIOPSY OF LIVER WITH ULTRASOUND GUIDANCE N/A 2024    Procedure: BIOPSY, LIVER, WITH US GUIDANCE;  Surgeon: Gisell Spence MD;  Location: [facility] CATH LAB;  Service: Interventional Radiology;  Laterality: N/A;    BREAST BIOPSY Left 10/06/2017    BREAST BIOPSY Right     BREAST LUMPECTOMY Right     + CA     SECTION      , ,     COLONOSCOPY N/A 2019    Procedure: COLONOSCOPY;  Surgeon: Fredrick Bailey MD;  Location: [facility] ENDO (4TH FLR);  Service: Endoscopy;  Laterality: N/A;    HYSTERECTOMY         Family History   Problem Relation Name Age of Onset    Breast cancer Sister  48    Breast cancer Cousin mat         30's    Breast cancer Cousin pat 48    Pancreatic cancer Mother      Breast cancer Daughter      Ovarian cancer Neg Hx         Social History     Socioeconomic History    Marital status:    Tobacco Use    Smoking status: Never    Smokeless tobacco: Never   Substance and Sexual Activity    Alcohol use: No    Drug use: No    Sexual activity: Yes     Partners: Male     Birth control/protection: See Surgical Hx     Social Drivers of Health     Financial Resource Strain: Low Risk  (2024)    Overall Financial Resource Strain (CARDIA)     Difficulty of Paying Living Expenses: Not hard at all   Food Insecurity: No Food Insecurity (2024)    Hunger Vital Sign     Worried About Running Out of Food in the Last Year: Never true     Ran Out of Food in the Last  Year: Never true   Transportation Needs: No Transportation Needs (11/4/2024)    TRANSPORTATION NEEDS     Transportation : No   Physical Activity: Insufficiently Active (11/4/2024)    Exercise Vital Sign     Days of Exercise per Week: 4 days     Minutes of Exercise per Session: 10 min   Stress: Stress Concern Present (11/4/2024)    [employer] - Occupational Stress Questionnaire     Feeling of Stress : To some extent   Housing Stability: Low Risk  (11/4/2024)    Housing Stability Vital Sign     Unable to Pay for Housing in the Last Year: No     Homeless in the Last Year: No       Review of Systems   Constitutional:  Positive for appetite change, fatigue and unexpected weight change. Negative for chills and fever.   HENT:  Negative for mouth sores, nosebleeds, tinnitus, trouble swallowing and voice change.    Eyes:  Negative for pain, redness and visual disturbance.   Respiratory:  Negative for cough, shortness of breath and wheezing.    Cardiovascular:  Negative for chest pain, palpitations and leg swelling.   Gastrointestinal:  Positive for abdominal distention, abdominal pain, blood in stool, nausea and vomiting. Negative for constipation and diarrhea.   Endocrine: Negative for polydipsia, polyphagia and polyuria.   Genitourinary:  Negative for flank pain, frequency and hematuria.   Musculoskeletal:  Negative for arthralgias, back pain, gait problem, joint swelling, myalgias, neck pain and neck stiffness.   Skin:  Positive for color change. Negative for pallor, rash and wound.   Neurological:  Positive for weakness. Negative for seizures, syncope, speech difficulty, light-headedness, numbness and headaches.   Hematological:  Negative for adenopathy. Does not bruise/bleed easily.   Psychiatric/Behavioral:  Negative for confusion, dysphoric mood and self-injury. The patient is not nervous/anxious.    All other systems reviewed and are negative.      Objective:  Physical  Exam  Constitutional:       Appearance: She is ill-appearing.   HENT:      Head: Normocephalic and atraumatic.      Nose: Nose normal.   Eyes:      General: Scleral icterus present.      Extraocular Movements: Extraocular movements intact.   Skin:     Coloration: Skin is jaundiced.   Neurological:      Mental Status: She is alert and oriented to person, place, and time. Mental status is at baseline.   Psychiatric:         Mood and Affect: Mood normal.         Behavior: Behavior normal.         Thought Content: Thought content normal.         Judgment: Judgment normal.         Labs:  Labs reviewed. Bilirubin frankie to 10.9. CEA 5402    Imaging Data:  CT A/P 11/4/24:  Narrative & Impression  EXAMINATION:  CT ABDOMEN PELVIS WITH IV CONTRAST     CLINICAL HISTORY:  Abdominal pain, acute, nonlocalized;     TECHNIQUE:  Low dose axial images, sagittal and coronal reformations were obtained from the lung bases to the pubic symphysis following the IV administration of 75 mL of Omnipaque 350 .  Oral contrast was not given.     COMPARISON:  Abdominal ultrasound from the same date.     FINDINGS:  The visualized portion of the heart is unremarkable.  Several small scattered pulmonary nodules are seen, the largest of which measures 6 mm in the right lower lobe.  Lung bases show no consolidation or pleural effusion.     Liver is enlarged measuring 22 cm with multiple scattered ill-defined hypoattenuating masses.  These range up to 5 cm in size in the right hepatic lobe.  There is no intra-or extrahepatic biliary ductal dilatation.  The gallbladder is contracted.  Portal vein and splenic vein are patent.  There is a small gastric diverticulum projecting posteriorly from the fundal region.  Stomach is otherwise normal in appearance.  Pancreas, spleen, and adrenal glands are unremarkable.     Kidneys enhance normally with no evidence of hydronephrosis.  Two nonobstructing right renal stones are visualized, the larger of which measures  0.9 cm.  No abnormalities are seen along the ureteral courses.  Urinary bladder is unremarkable.  Uterus has been removed.     Appendix is visualized and is unremarkable.  No evidence of bowel obstruction.  Few scattered colonic diverticula are seen.  There is a 4 cm soft tissue mass visualized within the mid descending colon.  No free air or free fluid.     Aorta tapers normally.     No acute osseous abnormality identified. Small fat containing umbilical hernia is noted.     Impression:     1. Mid descending colon 4 cm soft tissue mass concerning for primary malignancy.  Future follow-up is advised.  2. Hepatomegaly with multiple hypoattenuating hepatic masses consistent with metastatic disease given aforementioned findings.  3. Multiple small pulmonary nodules also concerning for metastatic disease.  4. Postsurgical changes and additional findings as detailed above.  This report was flagged in Epic as abnormal.    Assessment and plan:    1. Malignant neoplasm of descending colon    2. Colon adenocarcinoma    3. Secondary malignant neoplasm of liver    4. Immunodeficiency secondary to neoplasm    5. Subacute liver failure without hepatic coma      1-4  - Ms Ma is a 70 yo woman with newly diagnosed metastatic adenocarcinoma to the liver. CT A/P showed a mass in the descending colon. Large disease burden in the liver. High bilirubin due to disease burden.   - Long discussion with patient, daughter, , son on phone, re the diagnosis, natural history and prognosis of a stage IV colon cancer with extensive liver mets. Cancer is not curable but treatable. The goal of treatment is palliative, with hopes of containing the cancer, slowing down growth, and prolonging life. Chemo will be indefinite. Disease is not resectable  - discussed palliative FOLFOX. Reviewed side effects in detail  - IR will place port next Tue. Start chemo on Wed  - will start 5FU at 1200 mg/m2  - will talk to system chair to see if she  meets criteria for inpatient chemo  - refer to palliative. Messaged Dr Pineda  - refer to dietician  - emla sent to [facility]  - antiemetics sent to local pharmacy  - oxycodone for pain and periactin for appetite sent to local pharmacy  - RTC next week for chemo    5.  - see above. Start FOLFOX

## 2024-11-15 ENCOUNTER — TELEPHONE (OUTPATIENT)
Dept: HEMATOLOGY/ONCOLOGY | Facility: CLINIC | Age: 69
End: 2024-11-15
Payer: COMMERCIAL

## 2024-11-15 ENCOUNTER — HOSPITAL ENCOUNTER (INPATIENT)
Facility: HOSPITAL | Age: 69
LOS: 4 days | Discharge: HOME | DRG: 847 | End: 2024-11-19
Attending: EMERGENCY MEDICINE | Admitting: INTERNAL MEDICINE
Payer: COMMERCIAL

## 2024-11-15 ENCOUNTER — OFFICE VISIT (OUTPATIENT)
Dept: HEMATOLOGY/ONCOLOGY | Facility: CLINIC | Age: 69
End: 2024-11-15
Payer: COMMERCIAL

## 2024-11-15 VITALS
SYSTOLIC BLOOD PRESSURE: 119 MMHG | OXYGEN SATURATION: 99 % | DIASTOLIC BLOOD PRESSURE: 78 MMHG | HEIGHT: 61 IN | WEIGHT: 133.19 LBS | HEART RATE: 105 BPM | BODY MASS INDEX: 25.14 KG/M2 | TEMPERATURE: 98 F | RESPIRATION RATE: 18 BRPM

## 2024-11-15 DIAGNOSIS — D49.9 IMMUNODEFICIENCY SECONDARY TO NEOPLASM: ICD-10-CM

## 2024-11-15 DIAGNOSIS — C18.9 COLON ADENOCARCINOMA: Primary | ICD-10-CM

## 2024-11-15 DIAGNOSIS — D84.81 IMMUNODEFICIENCY SECONDARY TO NEOPLASM: ICD-10-CM

## 2024-11-15 DIAGNOSIS — C18.6 MALIGNANT NEOPLASM OF DESCENDING COLON: Primary | ICD-10-CM

## 2024-11-15 DIAGNOSIS — C18.9 COLON ADENOCARCINOMA: ICD-10-CM

## 2024-11-15 DIAGNOSIS — K72.00 SUBACUTE LIVER FAILURE WITHOUT HEPATIC COMA: ICD-10-CM

## 2024-11-15 DIAGNOSIS — C18.6 MALIGNANT NEOPLASM OF DESCENDING COLON: ICD-10-CM

## 2024-11-15 DIAGNOSIS — R10.9 ABDOMINAL PAIN, UNSPECIFIED ABDOMINAL LOCATION: ICD-10-CM

## 2024-11-15 DIAGNOSIS — C78.7 SECONDARY MALIGNANT NEOPLASM OF LIVER: ICD-10-CM

## 2024-11-15 DIAGNOSIS — R07.9 CHEST PAIN: ICD-10-CM

## 2024-11-15 LAB
ALBUMIN SERPL BCP-MCNC: 2.1 G/DL (ref 3.5–5.2)
ALLENS TEST: ABNORMAL
ALLENS TEST: ABNORMAL
ALP SERPL-CCNC: 1549 U/L (ref 40–150)
ALT SERPL W/O P-5'-P-CCNC: 82 U/L (ref 10–44)
ANION GAP SERPL CALC-SCNC: 30 MMOL/L (ref 8–16)
AST SERPL-CCNC: 156 U/L (ref 10–40)
BASOPHILS # BLD AUTO: 0.07 K/UL (ref 0–0.2)
BASOPHILS NFR BLD: 0.6 % (ref 0–1.9)
BILIRUB SERPL-MCNC: 10 MG/DL (ref 0.1–1)
BUN SERPL-MCNC: 28 MG/DL (ref 8–23)
CALCIUM SERPL-MCNC: 10 MG/DL (ref 8.7–10.5)
CHLORIDE SERPL-SCNC: 89 MMOL/L (ref 95–110)
CO2 SERPL-SCNC: 15 MMOL/L (ref 23–29)
CREAT SERPL-MCNC: 1.1 MG/DL (ref 0.5–1.4)
DIFFERENTIAL METHOD BLD: ABNORMAL
EOSINOPHIL # BLD AUTO: 0 K/UL (ref 0–0.5)
EOSINOPHIL NFR BLD: 0.1 % (ref 0–8)
ERYTHROCYTE [DISTWIDTH] IN BLOOD BY AUTOMATED COUNT: 18.6 % (ref 11.5–14.5)
EST. GFR  (NO RACE VARIABLE): 55.1 ML/MIN/1.73 M^2
GLUCOSE SERPL-MCNC: 97 MG/DL (ref 70–110)
HCO3 UR-SCNC: 16.6 MMOL/L (ref 24–28)
HCT VFR BLD AUTO: 31.4 % (ref 37–48.5)
HGB BLD-MCNC: 10.1 G/DL (ref 12–16)
IMM GRANULOCYTES # BLD AUTO: 0.09 K/UL (ref 0–0.04)
IMM GRANULOCYTES NFR BLD AUTO: 0.7 % (ref 0–0.5)
INR PPP: 1.3 (ref 0.8–1.2)
LDH SERPL L TO P-CCNC: 17.83 MMOL/L (ref 0.5–2.2)
LYMPHOCYTES # BLD AUTO: 1.4 K/UL (ref 1–4.8)
LYMPHOCYTES NFR BLD: 11 % (ref 18–48)
MCH RBC QN AUTO: 28.1 PG (ref 27–31)
MCHC RBC AUTO-ENTMCNC: 32.2 G/DL (ref 32–36)
MCV RBC AUTO: 87 FL (ref 82–98)
MONOCYTES # BLD AUTO: 0.6 K/UL (ref 0.3–1)
MONOCYTES NFR BLD: 5 % (ref 4–15)
NEUTROPHILS # BLD AUTO: 10.5 K/UL (ref 1.8–7.7)
NEUTROPHILS NFR BLD: 82.6 % (ref 38–73)
NRBC BLD-RTO: 0 /100 WBC
PCO2 BLDA: 29.1 MMHG (ref 35–45)
PH SMN: 7.36 [PH] (ref 7.35–7.45)
PLATELET # BLD AUTO: 666 K/UL (ref 150–450)
PMV BLD AUTO: 9.9 FL (ref 9.2–12.9)
PO2 BLDA: 44 MMHG (ref 40–60)
POC BE: -9 MMOL/L
POC SATURATED O2: 79 % (ref 95–100)
POC TCO2: 17 MMOL/L (ref 24–29)
POCT GLUCOSE: 73 MG/DL (ref 70–110)
POTASSIUM SERPL-SCNC: 3.9 MMOL/L (ref 3.5–5.1)
PROT SERPL-MCNC: 7.3 G/DL (ref 6–8.4)
PROTHROMBIN TIME: 14.5 SEC (ref 9–12.5)
RBC # BLD AUTO: 3.6 M/UL (ref 4–5.4)
SAMPLE: ABNORMAL
SAMPLE: ABNORMAL
SARS-COV-2 RDRP RESP QL NAA+PROBE: NEGATIVE
SITE: ABNORMAL
SITE: ABNORMAL
SODIUM SERPL-SCNC: 134 MMOL/L (ref 136–145)
WBC # BLD AUTO: 12.67 K/UL (ref 3.9–12.7)

## 2024-11-15 PROCEDURE — 20600001 HC STEP DOWN PRIVATE ROOM

## 2024-11-15 PROCEDURE — 83605 ASSAY OF LACTIC ACID: CPT

## 2024-11-15 PROCEDURE — 80053 COMPREHEN METABOLIC PANEL: CPT | Mod: 91 | Performed by: STUDENT IN AN ORGANIZED HEALTH CARE EDUCATION/TRAINING PROGRAM

## 2024-11-15 PROCEDURE — 99900035 HC TECH TIME PER 15 MIN (STAT)

## 2024-11-15 PROCEDURE — 85610 PROTHROMBIN TIME: CPT

## 2024-11-15 PROCEDURE — 82803 BLOOD GASES ANY COMBINATION: CPT

## 2024-11-15 PROCEDURE — 25000003 PHARM REV CODE 250: Performed by: INTERNAL MEDICINE

## 2024-11-15 PROCEDURE — 99285 EMERGENCY DEPT VISIT HI MDM: CPT

## 2024-11-15 PROCEDURE — 87635 SARS-COV-2 COVID-19 AMP PRB: CPT

## 2024-11-15 PROCEDURE — 85025 COMPLETE CBC W/AUTO DIFF WBC: CPT | Mod: 91 | Performed by: STUDENT IN AN ORGANIZED HEALTH CARE EDUCATION/TRAINING PROGRAM

## 2024-11-15 PROCEDURE — 63600175 PHARM REV CODE 636 W HCPCS: Performed by: INTERNAL MEDICINE

## 2024-11-15 RX ORDER — SODIUM CHLORIDE 0.9 % (FLUSH) 0.9 %
10 SYRINGE (ML) INJECTION
Status: CANCELLED | OUTPATIENT
Start: 2024-11-16

## 2024-11-15 RX ORDER — INSULIN ASPART 100 [IU]/ML
0-10 INJECTION, SOLUTION INTRAVENOUS; SUBCUTANEOUS
Status: DISCONTINUED | OUTPATIENT
Start: 2024-11-15 | End: 2024-11-19 | Stop reason: HOSPADM

## 2024-11-15 RX ORDER — DICYCLOMINE HYDROCHLORIDE 10 MG/1
10 CAPSULE ORAL
Status: DISCONTINUED | OUTPATIENT
Start: 2024-11-15 | End: 2024-11-19 | Stop reason: HOSPADM

## 2024-11-15 RX ORDER — IBUPROFEN 200 MG
16 TABLET ORAL
Status: DISCONTINUED | OUTPATIENT
Start: 2024-11-15 | End: 2024-11-19 | Stop reason: HOSPADM

## 2024-11-15 RX ORDER — OXYCODONE HYDROCHLORIDE 5 MG/1
5 TABLET ORAL EVERY 6 HOURS PRN
Qty: 90 TABLET | Refills: 0 | Status: ON HOLD | OUTPATIENT
Start: 2024-11-15

## 2024-11-15 RX ORDER — EPINEPHRINE 0.3 MG/.3ML
0.3 INJECTION SUBCUTANEOUS ONCE AS NEEDED
Status: CANCELLED | OUTPATIENT
Start: 2024-11-16

## 2024-11-15 RX ORDER — TALC
6 POWDER (GRAM) TOPICAL NIGHTLY PRN
Status: DISCONTINUED | OUTPATIENT
Start: 2024-11-15 | End: 2024-11-19 | Stop reason: HOSPADM

## 2024-11-15 RX ORDER — OLANZAPINE 2.5 MG/1
5 TABLET ORAL NIGHTLY
Status: DISCONTINUED | OUTPATIENT
Start: 2024-11-15 | End: 2024-11-16

## 2024-11-15 RX ORDER — POLYETHYLENE GLYCOL 3350 17 G/17G
17 POWDER, FOR SOLUTION ORAL DAILY
Status: DISCONTINUED | OUTPATIENT
Start: 2024-11-16 | End: 2024-11-19 | Stop reason: HOSPADM

## 2024-11-15 RX ORDER — OLANZAPINE 5 MG/1
5 TABLET ORAL NIGHTLY
Qty: 30 TABLET | Refills: 11 | Status: ON HOLD | OUTPATIENT
Start: 2024-11-15

## 2024-11-15 RX ORDER — IBUPROFEN 200 MG
24 TABLET ORAL
Status: DISCONTINUED | OUTPATIENT
Start: 2024-11-15 | End: 2024-11-19 | Stop reason: HOSPADM

## 2024-11-15 RX ORDER — GLUCAGON 1 MG
1 KIT INJECTION
Status: DISCONTINUED | OUTPATIENT
Start: 2024-11-15 | End: 2024-11-19 | Stop reason: HOSPADM

## 2024-11-15 RX ORDER — ONDANSETRON HYDROCHLORIDE 2 MG/ML
4 INJECTION, SOLUTION INTRAVENOUS EVERY 8 HOURS PRN
Status: DISCONTINUED | OUTPATIENT
Start: 2024-11-15 | End: 2024-11-16

## 2024-11-15 RX ORDER — ACETAMINOPHEN 325 MG/1
650 TABLET ORAL EVERY 4 HOURS PRN
Status: DISCONTINUED | OUTPATIENT
Start: 2024-11-15 | End: 2024-11-19 | Stop reason: HOSPADM

## 2024-11-15 RX ORDER — PROCHLORPERAZINE EDISYLATE 5 MG/ML
5 INJECTION INTRAMUSCULAR; INTRAVENOUS ONCE AS NEEDED
Status: CANCELLED | OUTPATIENT
Start: 2024-11-18

## 2024-11-15 RX ORDER — LIDOCAINE AND PRILOCAINE 25; 25 MG/G; MG/G
CREAM TOPICAL ONCE
Qty: 30 G | Refills: 2 | Status: ON HOLD | OUTPATIENT
Start: 2024-11-15 | End: 2024-11-16

## 2024-11-15 RX ORDER — HEPARIN 100 UNIT/ML
500 SYRINGE INTRAVENOUS
Status: CANCELLED | OUTPATIENT
Start: 2024-11-16

## 2024-11-15 RX ORDER — CYPROHEPTADINE HYDROCHLORIDE 4 MG/1
4 TABLET ORAL 3 TIMES DAILY
Status: DISCONTINUED | OUTPATIENT
Start: 2024-11-15 | End: 2024-11-19 | Stop reason: HOSPADM

## 2024-11-15 RX ORDER — HEPARIN 100 UNIT/ML
500 SYRINGE INTRAVENOUS
Status: CANCELLED | OUTPATIENT
Start: 2024-11-18

## 2024-11-15 RX ORDER — PROCHLORPERAZINE EDISYLATE 5 MG/ML
5 INJECTION INTRAMUSCULAR; INTRAVENOUS ONCE AS NEEDED
Status: CANCELLED | OUTPATIENT
Start: 2024-11-16

## 2024-11-15 RX ORDER — DIPHENHYDRAMINE HYDROCHLORIDE 50 MG/ML
50 INJECTION INTRAMUSCULAR; INTRAVENOUS ONCE AS NEEDED
Status: CANCELLED | OUTPATIENT
Start: 2024-11-16

## 2024-11-15 RX ORDER — NALOXONE HCL 0.4 MG/ML
0.02 VIAL (ML) INJECTION
Status: DISCONTINUED | OUTPATIENT
Start: 2024-11-15 | End: 2024-11-19 | Stop reason: HOSPADM

## 2024-11-15 RX ORDER — NIFEDIPINE 60 MG/1
60 TABLET, EXTENDED RELEASE ORAL DAILY
Status: DISCONTINUED | OUTPATIENT
Start: 2024-11-16 | End: 2024-11-19 | Stop reason: HOSPADM

## 2024-11-15 RX ORDER — SODIUM CHLORIDE 0.9 % (FLUSH) 0.9 %
10 SYRINGE (ML) INJECTION EVERY 8 HOURS PRN
Status: DISCONTINUED | OUTPATIENT
Start: 2024-11-15 | End: 2024-11-19 | Stop reason: HOSPADM

## 2024-11-15 RX ORDER — OXYCODONE HYDROCHLORIDE 5 MG/1
5 TABLET ORAL EVERY 6 HOURS PRN
Status: DISCONTINUED | OUTPATIENT
Start: 2024-11-15 | End: 2024-11-19 | Stop reason: HOSPADM

## 2024-11-15 RX ORDER — PROCHLORPERAZINE EDISYLATE 5 MG/ML
5 INJECTION INTRAMUSCULAR; INTRAVENOUS EVERY 6 HOURS PRN
Status: DISCONTINUED | OUTPATIENT
Start: 2024-11-15 | End: 2024-11-18

## 2024-11-15 RX ORDER — PROCHLORPERAZINE MALEATE 5 MG
5 TABLET ORAL EVERY 6 HOURS PRN
Qty: 60 TABLET | Refills: 5 | Status: ON HOLD | OUTPATIENT
Start: 2024-11-15

## 2024-11-15 RX ORDER — AMOXICILLIN 250 MG
1 CAPSULE ORAL 2 TIMES DAILY PRN
Status: DISCONTINUED | OUTPATIENT
Start: 2024-11-15 | End: 2024-11-19 | Stop reason: HOSPADM

## 2024-11-15 RX ORDER — SODIUM CHLORIDE 0.9 % (FLUSH) 0.9 %
10 SYRINGE (ML) INJECTION
Status: CANCELLED | OUTPATIENT
Start: 2024-11-18

## 2024-11-15 RX ORDER — MORPHINE SULFATE 2 MG/ML
2 INJECTION, SOLUTION INTRAMUSCULAR; INTRAVENOUS EVERY 4 HOURS PRN
Status: DISCONTINUED | OUTPATIENT
Start: 2024-11-15 | End: 2024-11-19 | Stop reason: HOSPADM

## 2024-11-15 RX ORDER — CYPROHEPTADINE HYDROCHLORIDE 4 MG/1
4 TABLET ORAL 3 TIMES DAILY
Qty: 90 TABLET | Refills: 2 | Status: ON HOLD | OUTPATIENT
Start: 2024-11-15

## 2024-11-15 RX ORDER — ENOXAPARIN SODIUM 100 MG/ML
40 INJECTION SUBCUTANEOUS EVERY 24 HOURS
Status: DISCONTINUED | OUTPATIENT
Start: 2024-11-15 | End: 2024-11-19 | Stop reason: HOSPADM

## 2024-11-15 RX ADMIN — ONDANSETRON 4 MG: 2 INJECTION INTRAMUSCULAR; INTRAVENOUS at 08:11

## 2024-11-15 RX ADMIN — MORPHINE SULFATE 2 MG: 2 INJECTION, SOLUTION INTRAMUSCULAR; INTRAVENOUS at 08:11

## 2024-11-15 RX ADMIN — SODIUM CHLORIDE 1000 ML: 9 INJECTION, SOLUTION INTRAVENOUS at 08:11

## 2024-11-15 RX ADMIN — ENOXAPARIN SODIUM 40 MG: 40 INJECTION SUBCUTANEOUS at 08:11

## 2024-11-15 NOTE — HPI
Ms Ma is a 70 yo woman with HTN, HLD, history of breast cancer, T2DM. She presented with abdominal pain and bloating worsened with eating or drinking, along with frequent bowel movements after eating. RUQ pain radiating to the back. She was noted to have elevated LFT and was sent to ED. Bilirubin was noted to be 6.7, alk phos 830, , ALT 80. CT abdomen/pelvis on 11/4/24 showed Mid descending colon 4 cm soft tissue mass concerning for primary malignancy. Hepatomegaly with multiple hypoattenuating hepatic masses consistent with metastatic disease given aforementioned findings. Multiple small pulmonary nodules also concerning for metastatic disease. Postsurgical changes and additional findings as detailed above. liver biopsy on 11/5/2024 showed adenocarcinoma with mucinous features. Positive for CDX2, CK20, CK7, negative for TTF1, ER, PAX8, GATA3. Primary site include GI tract, pancreatobiliary tract and among others. MMR-proficient. Tempus tissue pending. Patient presents today for further management. Feeling poorly. +anorexia. Throws up after she eats. Can only tolerate ice cream. +weight loss. +abdominal pain. +jaundice. +weakness.     Pt is being admitted to our service for inpatient chemo in setting of colon cancer with liver mets causing rapidly increasing bilirubin.

## 2024-11-15 NOTE — ASSESSMENT & PLAN NOTE
Pt admitted to service for inpatient chemo initiation due to rapidly increasing bilirubin.     Plan  - Initiate chemo

## 2024-11-15 NOTE — TELEPHONE ENCOUNTER
Contacted pt per Dr. Manriquez's request to inform her that the prescription for Emla cream had to be sent to [facility] and the others to her local pharmacy. Pt verbalized understanding. She had left the facility but voiced she would  before Wednesday. Call ended well.

## 2024-11-15 NOTE — Clinical Note
Urgent. See AYLA 11/20 with CBC, CMP, CEA then start FOLFOX same day. Any place. Do not postpone chemo. CBC, CMP, CEA, CT chest on 12/3, see me on 12/4 then chemo same day

## 2024-11-16 PROBLEM — C78.7 METASTASIS TO LIVER: Status: ACTIVE | Noted: 2024-01-01

## 2024-11-16 LAB
ANION GAP SERPL CALC-SCNC: 27 MMOL/L (ref 8–16)
BASOPHILS # BLD AUTO: 0.06 K/UL (ref 0–0.2)
BASOPHILS NFR BLD: 0.5 % (ref 0–1.9)
BUN SERPL-MCNC: 25 MG/DL (ref 8–23)
CALCIUM SERPL-MCNC: 9.5 MG/DL (ref 8.7–10.5)
CHLORIDE SERPL-SCNC: 93 MMOL/L (ref 95–110)
CO2 SERPL-SCNC: 16 MMOL/L (ref 23–29)
CREAT SERPL-MCNC: 0.9 MG/DL (ref 0.5–1.4)
DIFFERENTIAL METHOD BLD: ABNORMAL
EOSINOPHIL # BLD AUTO: 0 K/UL (ref 0–0.5)
EOSINOPHIL NFR BLD: 0.2 % (ref 0–8)
ERYTHROCYTE [DISTWIDTH] IN BLOOD BY AUTOMATED COUNT: 18.5 % (ref 11.5–14.5)
EST. GFR  (NO RACE VARIABLE): >60 ML/MIN/1.73 M^2
GLUCOSE SERPL-MCNC: 85 MG/DL (ref 70–110)
HCT VFR BLD AUTO: 27.5 % (ref 37–48.5)
HGB BLD-MCNC: 9.2 G/DL (ref 12–16)
IMM GRANULOCYTES # BLD AUTO: 0.05 K/UL (ref 0–0.04)
IMM GRANULOCYTES NFR BLD AUTO: 0.4 % (ref 0–0.5)
LYMPHOCYTES # BLD AUTO: 1.7 K/UL (ref 1–4.8)
LYMPHOCYTES NFR BLD: 14 % (ref 18–48)
MCH RBC QN AUTO: 28.8 PG (ref 27–31)
MCHC RBC AUTO-ENTMCNC: 33.5 G/DL (ref 32–36)
MCV RBC AUTO: 86 FL (ref 82–98)
MONOCYTES # BLD AUTO: 0.8 K/UL (ref 0.3–1)
MONOCYTES NFR BLD: 6.9 % (ref 4–15)
NEUTROPHILS # BLD AUTO: 9.2 K/UL (ref 1.8–7.7)
NEUTROPHILS NFR BLD: 78 % (ref 38–73)
NRBC BLD-RTO: 0 /100 WBC
PLATELET # BLD AUTO: 624 K/UL (ref 150–450)
PMV BLD AUTO: 9.6 FL (ref 9.2–12.9)
POCT GLUCOSE: 104 MG/DL (ref 70–110)
POCT GLUCOSE: 54 MG/DL (ref 70–110)
POCT GLUCOSE: 58 MG/DL (ref 70–110)
POCT GLUCOSE: 60 MG/DL (ref 70–110)
POCT GLUCOSE: 79 MG/DL (ref 70–110)
POCT GLUCOSE: 97 MG/DL (ref 70–110)
POTASSIUM SERPL-SCNC: 3.5 MMOL/L (ref 3.5–5.1)
RBC # BLD AUTO: 3.19 M/UL (ref 4–5.4)
SODIUM SERPL-SCNC: 136 MMOL/L (ref 136–145)
WBC # BLD AUTO: 11.82 K/UL (ref 3.9–12.7)

## 2024-11-16 PROCEDURE — 25000003 PHARM REV CODE 250: Performed by: INTERNAL MEDICINE

## 2024-11-16 PROCEDURE — 3E04305 INTRODUCTION OF OTHER ANTINEOPLASTIC INTO CENTRAL VEIN, PERCUTANEOUS APPROACH: ICD-10-PCS | Performed by: HOSPITALIST

## 2024-11-16 PROCEDURE — 20600001 HC STEP DOWN PRIVATE ROOM

## 2024-11-16 PROCEDURE — 85025 COMPLETE CBC W/AUTO DIFF WBC: CPT

## 2024-11-16 PROCEDURE — 63600175 PHARM REV CODE 636 W HCPCS: Performed by: INTERNAL MEDICINE

## 2024-11-16 PROCEDURE — S5010 5% DEXTROSE AND 0.45% SALINE: HCPCS

## 2024-11-16 PROCEDURE — C1751 CATH, INF, PER/CENT/MIDLINE: HCPCS

## 2024-11-16 PROCEDURE — 63600175 PHARM REV CODE 636 W HCPCS: Performed by: HOSPITALIST

## 2024-11-16 PROCEDURE — 25000003 PHARM REV CODE 250: Performed by: HOSPITALIST

## 2024-11-16 PROCEDURE — 02HV33Z INSERTION OF INFUSION DEVICE INTO SUPERIOR VENA CAVA, PERCUTANEOUS APPROACH: ICD-10-PCS | Performed by: HOSPITALIST

## 2024-11-16 PROCEDURE — 99223 1ST HOSP IP/OBS HIGH 75: CPT | Mod: ,,, | Performed by: HOSPITALIST

## 2024-11-16 PROCEDURE — 80048 BASIC METABOLIC PNL TOTAL CA: CPT

## 2024-11-16 PROCEDURE — 36573 INSJ PICC RS&I 5 YR+: CPT

## 2024-11-16 PROCEDURE — 25000003 PHARM REV CODE 250

## 2024-11-16 PROCEDURE — 76937 US GUIDE VASCULAR ACCESS: CPT

## 2024-11-16 RX ORDER — SODIUM CHLORIDE 0.9 % (FLUSH) 0.9 %
10 SYRINGE (ML) INJECTION
Status: DISCONTINUED | OUTPATIENT
Start: 2024-11-16 | End: 2024-11-19 | Stop reason: HOSPADM

## 2024-11-16 RX ORDER — HEPARIN 100 UNIT/ML
500 SYRINGE INTRAVENOUS
Status: DISCONTINUED | OUTPATIENT
Start: 2024-11-16 | End: 2024-11-19 | Stop reason: HOSPADM

## 2024-11-16 RX ORDER — ONDANSETRON HYDROCHLORIDE 2 MG/ML
8 INJECTION, SOLUTION INTRAVENOUS
Status: COMPLETED | OUTPATIENT
Start: 2024-11-16 | End: 2024-11-19

## 2024-11-16 RX ORDER — SODIUM CHLORIDE 0.9 % (FLUSH) 0.9 %
10 SYRINGE (ML) INJECTION EVERY 12 HOURS PRN
Status: DISCONTINUED | OUTPATIENT
Start: 2024-11-16 | End: 2024-11-19 | Stop reason: HOSPADM

## 2024-11-16 RX ORDER — DEXTROSE MONOHYDRATE AND SODIUM CHLORIDE 5; .45 G/100ML; G/100ML
INJECTION, SOLUTION INTRAVENOUS CONTINUOUS
Status: DISCONTINUED | OUTPATIENT
Start: 2024-11-16 | End: 2024-11-17

## 2024-11-16 RX ORDER — OLANZAPINE 2.5 MG/1
5 TABLET ORAL NIGHTLY
Status: COMPLETED | OUTPATIENT
Start: 2024-11-16 | End: 2024-11-18

## 2024-11-16 RX ORDER — EPINEPHRINE 0.3 MG/.3ML
0.3 INJECTION SUBCUTANEOUS ONCE AS NEEDED
Status: DISCONTINUED | OUTPATIENT
Start: 2024-11-16 | End: 2024-11-19 | Stop reason: HOSPADM

## 2024-11-16 RX ORDER — DIPHENHYDRAMINE HYDROCHLORIDE 50 MG/ML
50 INJECTION INTRAMUSCULAR; INTRAVENOUS ONCE AS NEEDED
Status: DISCONTINUED | OUTPATIENT
Start: 2024-11-16 | End: 2024-11-19 | Stop reason: HOSPADM

## 2024-11-16 RX ORDER — PROCHLORPERAZINE EDISYLATE 5 MG/ML
5 INJECTION INTRAMUSCULAR; INTRAVENOUS EVERY 6 HOURS PRN
Status: DISCONTINUED | OUTPATIENT
Start: 2024-11-16 | End: 2024-11-19 | Stop reason: HOSPADM

## 2024-11-16 RX ADMIN — CYPROHEPTADINE HYDROCHLORIDE 4 MG: 4 TABLET ORAL at 05:11

## 2024-11-16 RX ADMIN — OXYCODONE 5 MG: 5 TABLET ORAL at 08:11

## 2024-11-16 RX ADMIN — OLANZAPINE 5 MG: 2.5 TABLET, FILM COATED ORAL at 10:11

## 2024-11-16 RX ADMIN — ONDANSETRON 4 MG: 2 INJECTION INTRAMUSCULAR; INTRAVENOUS at 08:11

## 2024-11-16 RX ADMIN — DEXTROSE AND SODIUM CHLORIDE: 5; 450 INJECTION, SOLUTION INTRAVENOUS at 05:11

## 2024-11-16 RX ADMIN — CYPROHEPTADINE HYDROCHLORIDE 4 MG: 4 TABLET ORAL at 10:11

## 2024-11-16 RX ADMIN — ONDANSETRON 8 MG: 2 INJECTION INTRAMUSCULAR; INTRAVENOUS at 08:11

## 2024-11-16 RX ADMIN — ENOXAPARIN SODIUM 40 MG: 40 INJECTION SUBCUTANEOUS at 05:11

## 2024-11-16 RX ADMIN — Medication 16 G: at 11:11

## 2024-11-16 RX ADMIN — OXALIPLATIN 139 MG: 5 INJECTION, SOLUTION INTRAVENOUS at 10:11

## 2024-11-16 RX ADMIN — LEUCOVORIN CALCIUM 655 MG: 350 INJECTION, POWDER, LYOPHILIZED, FOR SUSPENSION INTRAMUSCULAR; INTRAVENOUS at 10:11

## 2024-11-16 RX ADMIN — POLYETHYLENE GLYCOL 3350 17 G: 17 POWDER, FOR SOLUTION ORAL at 08:11

## 2024-11-16 RX ADMIN — CYPROHEPTADINE HYDROCHLORIDE 4 MG: 4 TABLET ORAL at 08:11

## 2024-11-16 RX ADMIN — NIFEDIPINE 60 MG: 60 TABLET, FILM COATED, EXTENDED RELEASE ORAL at 08:11

## 2024-11-16 RX ADMIN — FLUOROURACIL 985 MG: 50 INJECTION, SOLUTION INTRAVENOUS at 08:11

## 2024-11-16 RX ADMIN — DEXAMETHASONE SODIUM PHOSPHATE 12 MG: 4 INJECTION INTRA-ARTICULAR; INTRALESIONAL; INTRAMUSCULAR; INTRAVENOUS; SOFT TISSUE at 08:11

## 2024-11-16 NOTE — ASSESSMENT & PLAN NOTE
"Patient's FSGs are controlled on current medication regimen.  Last A1c reviewed-   Lab Results   Component Value Date    HGBA1C 4.8 11/04/2024     Most recent fingerstick glucose reviewed- No results for input(s): "POCTGLUCOSE" in the last 24 hours.  Current correctional scale  Medium  Maintain anti-hyperglycemic dose as follows-   Antihyperglycemics (From admission, onward)      Start     Stop Route Frequency Ordered    11/15/24 2042  insulin aspart U-100 pen 0-10 Units         -- SubQ Before meals & nightly PRN 11/15/24 1943          Hold Oral hypoglycemics while patient is in the hospital.      "

## 2024-11-16 NOTE — SUBJECTIVE & OBJECTIVE
Past Medical History:   Diagnosis Date    Breast cancer     Right    Diabetes mellitus     Hypertension     Liver lesion 2024       Past Surgical History:   Procedure Laterality Date    BIOPSY OF LIVER WITH ULTRASOUND GUIDANCE N/A 2024    Procedure: BIOPSY, LIVER, WITH US GUIDANCE;  Surgeon: Gisell Spence MD;  Location: [facility] CATH LAB;  Service: Interventional Radiology;  Laterality: N/A;    BREAST BIOPSY Left 10/06/2017    BREAST BIOPSY Right 1996    BREAST LUMPECTOMY Right     + CA     SECTION      , ,     COLONOSCOPY N/A 2019    Procedure: COLONOSCOPY;  Surgeon: Fredrick Bailey MD;  Location: [facility] ENDO ([facility] FLR);  Service: Endoscopy;  Laterality: N/A;    HYSTERECTOMY         Review of patient's allergies indicates:  No Known Allergies    No current facility-administered medications on file prior to encounter.     Current Outpatient Medications on File Prior to Encounter   Medication Sig    cyproheptadine (PERIACTIN) 4 mg tablet Take 1 tablet (4 mg total) by mouth 3 (three) times daily. To increase appetite    dicyclomine (BENTYL) 10 MG capsule Take 1 capsule (10 mg total) by mouth before meals as needed (For abdominal pain/bloating).    hydrochlorothiazide (HYDRODIURIL) 25 MG tablet Take 25 mg by mouth once daily.    HYDROcodone-acetaminophen (NORCO) 5-325 mg per tablet Take 1 tablet by mouth every 6 (six) hours as needed for Pain.    LIDOcaine-prilocaine (EMLA) cream Apply topically once. Apply over port site at least 60 minutes prior to chemo for 1 dose    nifedipine (PROCARDIA-XL) 60 MG (OSM) 24 hr tablet Take 60 mg by mouth once daily.    OLANZapine (ZYPREXA) 5 MG tablet Take 1 tablet (5 mg total) by mouth every evening. Take nightly on days 1-3 of each chemotherapy cycle.    ondansetron (ZOFRAN-ODT) 4 MG TbDL Take 1 tablet (4 mg total) by mouth every 6 (six) hours as needed (nausea / vomiting).    ondansetron (ZOFRAN-ODT) 4 MG TbDL Take 1 tablet (4 mg total) by  mouth every 6 (six) hours as needed (nausea with vomiting).    oxyCODONE (ROXICODONE) 5 MG immediate release tablet Take 1 tablet (5 mg total) by mouth every 6 (six) hours as needed for Pain.    polyethylene glycol (GLYCOLAX) 17 gram PwPk Take 17 g by mouth once daily.    prochlorperazine (COMPAZINE) 5 MG tablet Take 1 tablet (5 mg total) by mouth every 6 (six) hours as needed for Nausea.     Family History       Problem Relation (Age of Onset)    Breast cancer Sister (48), Cousin, Cousin (48), Daughter    Pancreatic cancer Mother          Tobacco Use    Smoking status: Never    Smokeless tobacco: Never   Substance and Sexual Activity    Alcohol use: No    Drug use: No    Sexual activity: Yes     Partners: Male     Birth control/protection: See Surgical Hx     Review of Systems   Constitutional:  Positive for activity change, appetite change and fatigue. Negative for chills, diaphoresis and fever.   Eyes:  Negative for visual disturbance.   Respiratory:  Negative for cough, chest tightness, shortness of breath and wheezing.    Cardiovascular:  Negative for chest pain, palpitations and leg swelling.   Gastrointestinal:  Positive for abdominal pain, nausea and vomiting. Negative for constipation and diarrhea.   Genitourinary:  Negative for dysuria, flank pain, frequency and hematuria.   Skin:  Negative for rash and wound.   Neurological:  Negative for dizziness, seizures, weakness, light-headedness and headaches.     Objective:     Vital Signs (Most Recent):  Temp: 98.2 °F (36.8 °C) (11/15/24 1900)  Pulse: 98 (11/15/24 1900)  Resp: 20 (11/15/24 1900)  BP: 126/73 (11/15/24 1900)  SpO2: 99 % (11/15/24 1900) Vital Signs (24h Range):  Temp:  [97.6 °F (36.4 °C)-98.2 °F (36.8 °C)] 98.2 °F (36.8 °C)  Pulse:  [] 98  Resp:  [16-20] 20  SpO2:  [96 %-99 %] 99 %  BP: (119-142)/(73-78) 126/73     Weight: 60.8 kg (134 lb)  Body mass index is 25.32 kg/m².     Physical Exam  Constitutional:       General: She is not in acute  distress.     Appearance: Normal appearance. She is ill-appearing.   HENT:      Head: Normocephalic and atraumatic.   Eyes:      Extraocular Movements: Extraocular movements intact.   Cardiovascular:      Rate and Rhythm: Normal rate.   Pulmonary:      Effort: Pulmonary effort is normal. No tachypnea or respiratory distress.   Abdominal:      General: Abdomen is flat. Bowel sounds are decreased. There is no distension.      Tenderness: There is abdominal tenderness in the right upper quadrant. There is no guarding or rebound.   Musculoskeletal:         General: Normal range of motion.      Cervical back: Normal range of motion and neck supple.   Skin:     General: Skin is dry.   Neurological:      Mental Status: She is alert and oriented to person, place, and time.   Psychiatric:         Attention and Perception: Attention and perception normal.         Mood and Affect: Mood and affect normal.                Significant Labs: All pertinent labs within the past 24 hours have been reviewed.    Significant Imaging: I have reviewed all pertinent imaging results/findings within the past 24 hours.

## 2024-11-16 NOTE — PROCEDURES
"Tosin Ma is a 69 y.o. female patient.    Temp: 97.4 °F (36.3 °C) (11/16/24 0728)  Pulse: 91 (11/16/24 0728)  Resp: 18 (11/16/24 0728)  BP: 117/74 (11/16/24 0728)  SpO2: 98 % (11/16/24 0728)  Weight: 62.4 kg (137 lb 9.1 oz) (11/15/24 2227)  Height: 5' 1" (154.9 cm) (11/15/24 2227)    PICC  Date/Time: 11/16/2024 10:15 AM  Performed by: Omid Shanks RN  Assisting provider: Belkis Matthews RN  Consent Done: Yes  Time out: Immediately prior to procedure a time out was called to verify the correct patient, procedure, equipment, support staff and site/side marked as required  Indications: med administration  Anesthesia: local infiltration  Local anesthetic: lidocaine 1% without epinephrine  Anesthetic Total (mL): 3  Preparation: skin prepped with ChloraPrep  Skin prep agent dried: skin prep agent completely dried prior to procedure  Sterile barriers: all five maximum sterile barriers used - cap, mask, sterile gown, sterile gloves, and large sterile sheet  Hand hygiene: hand hygiene performed prior to central venous catheter insertion  Location details: right brachial  Catheter type: double lumen  Catheter size: 5 Fr  Catheter Length: 35cm    Ultrasound guidance: yes  Vessel Caliber: medium and patent, compressibility normal  Needle advanced into vessel with real time Ultrasound guidance.  Guidewire confirmed in vessel.  Image recorded and saved.  Sterile sheath used.  Number of attempts: 1  Post-procedure: blood return through all ports, chlorhexidine patch and sterile dressing applied  Technical procedures used: 3CG  Estimated blood loss (mL): 0  Specimens: No  Implants: No  Assessment: placement verified by x-ray  Complications: none          Name LORENA Matthews RN  11/16/2024    "

## 2024-11-16 NOTE — ASSESSMENT & PLAN NOTE
Vitals:    11/15/24 1656 11/15/24 1900 11/15/24 2000 11/15/24 2100   BP: (!) 142/74 126/73 133/76 133/75    11/15/24 2227 11/16/24 0416 11/16/24 0728 11/16/24 1223   BP: (!) 165/84 127/70 117/74 119/78      Hold home nifedipine

## 2024-11-16 NOTE — H&P
[facility]  [facility] Medicine  History & Physical    Patient Name: Tosin Ma  MRN: [id number]  Admission Date: 11/15/2024  Attending Physician: Khris Ocampo IV, MD   Primary Care Provider: Lake Cunningham MD         Patient information was obtained from patient, past medical records, and ER records.       Subjective:     Principal Problem:Colon adenocarcinoma    Chief Complaint:   Chief Complaint   Patient presents with    Needs admission     Pt sent here for admission to start chemo for liver cancer        HPI: Patient is a year old female past medical history of hypertension, hyperlipidemia, diabetes mellitus type 2, history of breast cancer, and stage IV colon cancer that presents the ER due to worsening abdominal pain, vomiting, and initiation of chemotherapy.  Patient reports that she has been having worsening abdominal pain and multiple episodes of vomiting over last 2-3 days.  Patient reports she usually does not vomit but over last 2-3 days she has been vomiting on average 3-4 times per day with nonbloody, nonbilious vomiting.  Due to this poor oral intake, patient feels very fatigued and generalized weakness.  Patient's abdominal pain localized to the right upper quadrant consistent with known metastatic disease.  Patient followed with her oncologist today who recommended that she come to ER for admission for further evaluation and management of stage IV colon cancer and initiation of chemotherapy.    Past Medical History:   Diagnosis Date    Breast cancer 1996    Right    Diabetes mellitus     Hypertension     Liver lesion 11/5/2024       Past Surgical History:   Procedure Laterality Date    BIOPSY OF LIVER WITH ULTRASOUND GUIDANCE N/A 11/5/2024    Procedure: BIOPSY, LIVER, WITH US GUIDANCE;  Surgeon: Gisell Spence MD;  Location: [facility] CATH LAB;  Service: Interventional Radiology;  Laterality: N/A;    BREAST BIOPSY Left 10/06/2017    BREAST BIOPSY Right 1996    BREAST LUMPECTOMY Right  1996    + CA     SECTION      , ,     COLONOSCOPY N/A 2019    Procedure: COLONOSCOPY;  Surgeon: Fredrick Bailey MD;  Location: [facility] ([address] [place] [address]);  Service: Endoscopy;  Laterality: N/A;    HYSTERECTOMY         Review of patient's allergies indicates:  No Known Allergies    No current facility-administered medications on file prior to encounter.     Current Outpatient Medications on File Prior to Encounter   Medication Sig    cyproheptadine (PERIACTIN) 4 mg tablet Take 1 tablet (4 mg total) by mouth 3 (three) times daily. To increase appetite    dicyclomine (BENTYL) 10 MG capsule Take 1 capsule (10 mg total) by mouth before meals as needed (For abdominal pain/bloating).    hydrochlorothiazide (HYDRODIURIL) 25 MG tablet Take 25 mg by mouth once daily.    HYDROcodone-acetaminophen (NORCO) 5-325 mg per tablet Take 1 tablet by mouth every 6 (six) hours as needed for Pain.    LIDOcaine-prilocaine (EMLA) cream Apply topically once. Apply over port site at least 60 minutes prior to chemo for 1 dose    nifedipine (PROCARDIA-XL) 60 MG (OSM) 24 hr tablet Take 60 mg by mouth once daily.    OLANZapine (ZYPREXA) 5 MG tablet Take 1 tablet (5 mg total) by mouth every evening. Take nightly on days 1-3 of each chemotherapy cycle.    ondansetron (ZOFRAN-ODT) 4 MG TbDL Take 1 tablet (4 mg total) by mouth every 6 (six) hours as needed (nausea / vomiting).    ondansetron (ZOFRAN-ODT) 4 MG TbDL Take 1 tablet (4 mg total) by mouth every 6 (six) hours as needed (nausea with vomiting).    oxyCODONE (ROXICODONE) 5 MG immediate release tablet Take 1 tablet (5 mg total) by mouth every 6 (six) hours as needed for Pain.    polyethylene glycol (GLYCOLAX) 17 gram PwPk Take 17 g by mouth once daily.    prochlorperazine (COMPAZINE) 5 MG tablet Take 1 tablet (5 mg total) by mouth every 6 (six) hours as needed for Nausea.     Family History       Problem Relation (Age of Onset)    Breast cancer Sister (48), Cousin, Cousin  (48), Daughter    Pancreatic cancer Mother          Tobacco Use    Smoking status: Never    Smokeless tobacco: Never   Substance and Sexual Activity    Alcohol use: No    Drug use: No    Sexual activity: Yes     Partners: Male     Birth control/protection: See Surgical Hx     Review of Systems   Constitutional:  Positive for activity change, appetite change and fatigue. Negative for chills, diaphoresis and fever.   Eyes:  Negative for visual disturbance.   Respiratory:  Negative for cough, chest tightness, shortness of breath and wheezing.    Cardiovascular:  Negative for chest pain, palpitations and leg swelling.   Gastrointestinal:  Positive for abdominal pain, nausea and vomiting. Negative for constipation and diarrhea.   Genitourinary:  Negative for dysuria, flank pain, frequency and hematuria.   Skin:  Negative for rash and wound.   Neurological:  Negative for dizziness, seizures, weakness, light-headedness and headaches.     Objective:     Vital Signs (Most Recent):  Temp: 98.2 °F (36.8 °C) (11/15/24 1900)  Pulse: 98 (11/15/24 1900)  Resp: 20 (11/15/24 1900)  BP: 126/73 (11/15/24 1900)  SpO2: 99 % (11/15/24 1900) Vital Signs (24h Range):  Temp:  [97.6 °F (36.4 °C)-98.2 °F (36.8 °C)] 98.2 °F (36.8 °C)  Pulse:  [] 98  Resp:  [16-20] 20  SpO2:  [96 %-99 %] 99 %  BP: (119-142)/(73-78) 126/73     Weight: 60.8 kg (134 lb)  Body mass index is 25.32 kg/m².     Physical Exam  Constitutional:       General: She is not in acute distress.     Appearance: Normal appearance. She is ill-appearing.   HENT:      Head: Normocephalic and atraumatic.   Eyes:      Extraocular Movements: Extraocular movements intact.   Cardiovascular:      Rate and Rhythm: Normal rate.   Pulmonary:      Effort: Pulmonary effort is normal. No tachypnea or respiratory distress.   Abdominal:      General: Abdomen is flat. Bowel sounds are decreased. There is no distension.      Tenderness: There is abdominal tenderness in the right upper  "quadrant. There is no guarding or rebound.   Musculoskeletal:         General: Normal range of motion.      Cervical back: Normal range of motion and neck supple.   Skin:     General: Skin is dry.   Neurological:      Mental Status: She is alert and oriented to person, place, and time.   Psychiatric:         Attention and Perception: Attention and perception normal.         Mood and Affect: Mood and affect normal.                Significant Labs: All pertinent labs within the past 24 hours have been reviewed.    Significant Imaging: I have reviewed all pertinent imaging results/findings within the past 24 hours.  Assessment/Plan:     * Colon adenocarcinoma  Admitted to Oncology service.  PICC line ordered for initiation of chemotherapy.  Gentle IVF w/ NS @ 100 cc/hr.  Zofran and Phenergan for nausea PRN.  Oxycodone and Morphine for pain PRN.      Serum total bilirubin elevated  Stable from previous labs.  Continue to monitor with daily labs.      Elevated LFTs  Stable from previous labs.  Continue to monitor with daily labs.      Abdominal pain  Likely due to known mets.  Oxycodone and Morphine PRN for pain.      Essential hypertension  Patients blood pressure range in the last 24 hours was: BP  Min: 119/78  Max: 142/74.The patient's inpatient anti-hypertensive regimen is listed below:  Current Antihypertensives  NIFEdipine 24 hr tablet 60 mg, Daily, Oral    Plan  - BP is controlled, no changes needed to their regimen        HLD (hyperlipidemia)  Diet controlled, off statin.      Type 2 diabetes mellitus with hyperglycemia, without long-term current use of insulin  Patient's FSGs are controlled on current medication regimen.  Last A1c reviewed-   Lab Results   Component Value Date    HGBA1C 4.8 11/04/2024     Most recent fingerstick glucose reviewed- No results for input(s): "POCTGLUCOSE" in the last 24 hours.  Current correctional scale  Medium  Maintain anti-hyperglycemic dose as follows-   Antihyperglycemics " (From admission, onward)      Start     Stop Route Frequency Ordered    11/15/24 2042  insulin aspart U-100 pen 0-10 Units         -- SubQ Before meals & nightly PRN 11/15/24 1943          Hold Oral hypoglycemics while patient is in the hospital.          VTE Risk Mitigation (From admission, onward)           Ordered     enoxaparin injection 40 mg  Daily         11/15/24 1943     IP VTE HIGH RISK PATIENT  Once         11/15/24 1943     Place sequential compression device  Until discontinued         11/15/24 1943                         The attending portion of this evaluation, treatment, and documentation was performed per Darvin Weathers MD via Telemedicine AudioVisual using the secure [employer] software platform with 2 way audio/video. The provider was located off-site and the patient is located in the hospital. The aforementioned video software was utilized to document the relevant history and physical exam            Darvin Weathers MD  Department of Hospital Medicine   [facility] - Emergency Dept

## 2024-11-16 NOTE — ASSESSMENT & PLAN NOTE
Patients blood pressure range in the last 24 hours was: BP  Min: 119/78  Max: 142/74.The patient's inpatient anti-hypertensive regimen is listed below:  Current Antihypertensives  NIFEdipine 24 hr tablet 60 mg, Daily, Oral    Plan  - BP is controlled, no changes needed to their regimen

## 2024-11-16 NOTE — ASSESSMENT & PLAN NOTE
Patient's FSGs are controlled on current medication regimen.  Last A1c reviewed-   Lab Results   Component Value Date    HGBA1C 4.8 11/04/2024     POCT glucose low 11/16 AM requiring oral glucose tabs

## 2024-11-16 NOTE — ASSESSMENT & PLAN NOTE
CT 11/04: Hepatomegaly with multiple hypoattenuating hepatic masses consistent with metastatic disease   On admission ALP 1,549 with bilirubin 10.0 with scleral icterus. Hepatic metastatic lesions likely etiology causing obstruction. Causing right sided abdominal pain and nausea/emesis. Will initiate FOLFOX 11/16 inpatient in hopes of reducing size of hepatic lesions. No increased leukocytosis, hypotension, low suspicion for cholangitis at this time.      Prochlorperazine 5 mg for nausea  oxyCODONE immediate release tablet 5 mg  q6 prn for pain

## 2024-11-16 NOTE — PROGRESS NOTES
[facility] On[facility] ([facility])  Hematology/Oncology  Progress Note    Patient Name: Tosin Ma  Admission Date: 11/15/2024  Hospital Length of Stay: 1 days  Code Status: Full Code     Subjective:     HPI:  Ms Ma is a 70 yo woman with HTN, HLD, history of breast cancer, T2DM. She presented with abdominal pain and bloating worsened with eating or drinking, along with frequent bowel movements after eating. RUQ pain radiating to the back. She was noted to have elevated LFT and was sent to ED. Bilirubin was noted to be 6.7, alk phos 830, , ALT 80. CT abdomen/pelvis on 11/4/24 showed Mid descending colon 4 cm soft tissue mass concerning for primary malignancy. Hepatomegaly with multiple hypoattenuating hepatic masses consistent with metastatic disease given aforementioned findings. Multiple small pulmonary nodules also concerning for metastatic disease. Postsurgical changes and additional findings as detailed above. liver biopsy on 11/5/2024 showed adenocarcinoma with mucinous features. Positive for CDX2, CK20, CK7, negative for TTF1, ER, PAX8, GATA3. Primary site include GI tract, pancreatobiliary tract and among others. MMR-proficient. Tempus tissue pending. Patient presents today for further management. Feeling poorly. +anorexia. Throws up after she eats. Can only tolerate ice cream. +weight loss. +abdominal pain. +jaundice. +weakness.     Pt is being admitted to our service for inpatient chemo in setting of colon cancer with liver mets causing rapidly increasing bilirubin.     No new subjective & objective note has been filed under this hospital service since the last note was generated.    Assessment/Plan:     * Colon adenocarcinoma  Pt admitted to service for inpatient chemo initiation due to rapidly increasing bilirubin.     Plan  - Initiate chemo    Metastasis to liver    CT 11/04: Hepatomegaly with multiple hypoattenuating hepatic masses consistent with metastatic disease   On admission ALP  1,549 with bilirubin 10.0 with scleral icterus. Hepatic metastatic lesions likely etiology causing obstruction. Causing right sided abdominal pain and nausea/emesis. Will initiate FOLFOX 11/16 inpatient in hopes of reducing size of hepatic lesions. No increased leukocytosis, hypotension, low suspicion for cholangitis at this time.      Prochlorperazine 5 mg for nausea  oxyCODONE immediate release tablet 5 mg  q6 prn for pain                Essential hypertension  Vitals:    11/15/24 1656 11/15/24 1900 11/15/24 2000 11/15/24 2100   BP: (!) 142/74 126/73 133/76 133/75    11/15/24 2227 11/16/24 0416 11/16/24 0728 11/16/24 1223   BP: (!) 165/84 127/70 117/74 119/78      Hold home nifedipine     Type 2 diabetes mellitus with hyperglycemia, without long-term current use of insulin  Patient's FSGs are controlled on current medication regimen.  Last A1c reviewed-   Lab Results   Component Value Date    HGBA1C 4.8 11/04/2024     POCT glucose low 11/16 AM requiring oral glucose tabs               Lewis Stewart MD  Hematology/Oncology  [facility] - Oncology ([facility])

## 2024-11-16 NOTE — ED NOTES
Received report from YASMINE Cho. Assumed care of Tosin Ma, a 69 y.o. female at this time.    Triage note:  Chief Complaint   Patient presents with    Needs admission     Pt sent here for admission to start chemo for liver cancer       LOC: The patient is awake, alert and aware of environment with an appropriate affect, oriented x 4 and speaking appropriately.   APPEARANCE: Patient appears comfortable and in no acute distress, patient is clean and well groomed.  NEURO: Pt opens eyes spontaneously, behavior appropriate to situation, follows commands, facial expression symmetrical, bilateral hand grasp equal and even, purposeful motor response noted, normal sensation in all extremities when touched with a finger, PERRLA noted.  HEENT: Head is normocephalic and sits midline on the shoulders, eyes are symmetrical with no blurry vision or glasses; ears are clean, dry, intact, patent with no cerumen blocking the pathway, no hearing aids present; nose is free from clean, dry, intact, without any drainage, both nostrils are patent.   SKIN: The skin is warm and dry, color appears yellow on neck, patient has normal skin turgor and moist mucus membranes, skin intact, no breakdown or bruising noted. No drains or devices.  MUSCULOSKELETAL: Patient moving all extremities spontaneously with AROM in all extremities, no swelling noted. Generalized weakness noted. Pt is able to ambulate independently.  RESPIRATORY: Airway is open and patent, respirations are spontaneous, patient has a normal effort and rate, no accessory muscle use noted, breath sounds are clear and equal bilaterally. The pt denies SOB at this time.  CARDIAC: Cardiac monitoring continued. Patient has a normal rate and regular rhythm, no edema noted, capillary refill < 3 seconds. The pt denies chest pain at this time.  GASTRO: Soft and non tender to palpation, no distention noted, normoactive bowel sounds present in all four quadrants. The pt denies any nausea or  vomiting at this time.  : Pt denies any pain or frequency with urination. Pt verbalizes right sided flank pain that radiates to the back.  VASCULAR: All pulses 2+, all extremities warm, no numbness or tingling to any extremities, no edema noted.

## 2024-11-16 NOTE — ED PROVIDER NOTES
"Encounter Date: 11/15/2024       History     Chief Complaint   Patient presents with    Needs admission     Pt sent here for admission to start chemo for liver cancer     Ms Ma is a 68 yo woman with HTN, HLD, history of breast cancer, adenocarcinoma of the colon with metastases to the liver T2DM. She was diagnosed with colon adenocarcinoma via CT abdomen/pelvis on 11/4/24 showed "Mid descending colon 4 cm soft tissue mass concerning for primary malignancy. Hepatomegaly with multiple hypoattenuating hepatic masses consistent with metastatic disease given aforementioned findings. Liver biopsy on 11/5/2024 showed adenocarcinoma with mucinous features. She had a virtual visit with her oncologist Dr. Mnariquez on 11/15 with complaints of abdominal pain, bloating worsening with eating and drinking and frequent bowel movements. Labs obtained today were notable for Bilirubin 10.9, alk phos 1570, , ALT 82. Patient currently notes she is feeling poorly, with a decreased appetite, inability to tolerate any solids without vomiting, blood streaked stool, RUQ pain that radiates to the back. She was due to start palliative chemotherapy on 11/20 however given symptoms and discussion with oncology team patient recommended to be admitted to the hospital for further workup and management and possible inpatient chemotherapy.    The history is provided by the patient.     Review of patient's allergies indicates:  No Known Allergies  Past Medical History:   Diagnosis Date    Breast cancer 1996    Right    Diabetes mellitus     Hypertension     Liver lesion 11/5/2024     Past Surgical History:   Procedure Laterality Date    BIOPSY OF LIVER WITH ULTRASOUND GUIDANCE N/A 11/5/2024    Procedure: BIOPSY, LIVER, WITH US GUIDANCE;  Surgeon: Gisell Spence MD;  Location: [facility] CATH LAB;  Service: Interventional Radiology;  Laterality: N/A;    BREAST BIOPSY Left 10/06/2017    BREAST BIOPSY Right 1996    BREAST LUMPECTOMY Right 1996    + CA    "  SECTION      , ,     COLONOSCOPY N/A 2019    Procedure: COLONOSCOPY;  Surgeon: Fredrick Bailey MD;  Location: [facility] ([address] [place] [address]);  Service: Endoscopy;  Laterality: N/A;    HYSTERECTOMY       Family History   Problem Relation Name Age of Onset    Breast cancer Sister  48    Breast cancer Cousin mat         30's    Breast cancer Cousin pat 48    Pancreatic cancer Mother      Breast cancer Daughter      Ovarian cancer Neg Hx       Social History     Tobacco Use    Smoking status: Never    Smokeless tobacco: Never   Substance Use Topics    Alcohol use: No    Drug use: No     Review of Systems   Constitutional:  Positive for activity change and appetite change. Negative for chills and fever.   Respiratory:  Negative for cough, chest tightness, shortness of breath and wheezing.    Cardiovascular:  Negative for chest pain, palpitations and leg swelling.   Gastrointestinal:  Positive for abdominal pain (RUQ pain radiating towards back), blood in stool, nausea and vomiting. Negative for abdominal distention, constipation and diarrhea.   Endocrine: Negative for polydipsia and polyuria.   Genitourinary:  Negative for difficulty urinating and dysuria.   Musculoskeletal:  Positive for back pain. Negative for arthralgias.   Neurological:  Positive for weakness.       Physical Exam     Initial Vitals [11/15/24 1656]   BP Pulse Resp Temp SpO2   (!) 142/74 (!) 111 16 98 °F (36.7 °C) 96 %      MAP       --         Physical Exam    Constitutional: She appears well-developed and well-nourished. No distress.   HENT:   Head: Normocephalic and atraumatic.   Eyes: EOM are normal. Scleral icterus is present.   Neck:   Normal range of motion.  Cardiovascular:  Regular rhythm, normal heart sounds and intact distal pulses.   Tachycardia present.   Exam reveals no gallop and no friction rub.       No murmur heard.  Pulmonary/Chest: Breath sounds normal. No respiratory distress. She has no wheezes. She has no  rhonchi. She has no rales. She exhibits no tenderness.   Abdominal: Abdomen is soft. Bowel sounds are normal. There is abdominal tenderness. There is no rebound and no guarding.   Musculoskeletal:         General: No edema. Normal range of motion.      Cervical back: Normal range of motion.     Neurological: She is alert and oriented to person, place, and time.   Skin: Skin is warm and dry.   Psychiatric: She has a normal mood and affect.         ED Course   Procedures  Labs Reviewed   CBC W/ AUTO DIFFERENTIAL - Abnormal       Result Value    WBC 12.67      RBC 3.60 (*)     Hemoglobin 10.1 (*)     Hematocrit 31.4 (*)     MCV 87      MCH 28.1      MCHC 32.2      RDW 18.6 (*)     Platelets 666 (*)     MPV 9.9      Immature Granulocytes 0.7 (*)     Gran # (ANC) 10.5 (*)     Immature Grans (Abs) 0.09 (*)     Lymph # 1.4      Mono # 0.6      Eos # 0.0      Baso # 0.07      nRBC 0      Gran % 82.6 (*)     Lymph % 11.0 (*)     Mono % 5.0      Eosinophil % 0.1      Basophil % 0.6      Differential Method Automated     COMPREHENSIVE METABOLIC PANEL - Abnormal    Sodium 134 (*)     Potassium 3.9      Chloride 89 (*)     CO2 15 (*)     Glucose 97      BUN 28 (*)     Creatinine 1.1      Calcium 10.0      Total Protein 7.3      Albumin 2.1 (*)     Total Bilirubin 10.0 (*)     Alkaline Phosphatase 1,549 (*)      (*)     ALT 82 (*)     eGFR 55.1 (*)     Anion Gap 30 (*)    PROTIME-INR - Abnormal    Prothrombin Time 14.5 (*)     INR 1.3 (*)    ISTAT PROCEDURE - Abnormal    POC PH 7.364      POC PCO2 29.1 (*)     POC PO2 44      POC HCO3 16.6 (*)     POC BE -9 (*)     POC SATURATED O2 79      POC TCO2 17 (*)     Sample VENOUS      Site Other      Allens Test N/A     ISTAT LACTATE - Abnormal    POC Lactate 17.83 (*)     Sample VENOUS      Site Other      Allens Test N/A     SARS-COV-2 RNA AMPLIFICATION, QUAL   POCT GLUCOSE    POCT Glucose 73     POCT GLUCOSE MONITORING CONTINUOUS          Imaging Results    None           Medications   cyproheptadine 4 mg tablet 4 mg (has no administration in time range)   dicyclomine capsule 10 mg (has no administration in time range)   NIFEdipine 24 hr tablet 60 mg (has no administration in time range)   OLANZapine tablet 5 mg (has no administration in time range)   oxyCODONE immediate release tablet 5 mg (has no administration in time range)   polyethylene glycol packet 17 g (has no administration in time range)   sodium chloride 0.9% flush 10 mL (has no administration in time range)   melatonin tablet 6 mg (has no administration in time range)   ondansetron injection 4 mg (has no administration in time range)   senna-docusate 8.6-50 mg per tablet 1 tablet (has no administration in time range)   acetaminophen tablet 650 mg (has no administration in time range)   naloxone 0.4 mg/mL injection 0.02 mg (has no administration in time range)   glucose chewable tablet 16 g (has no administration in time range)   glucose chewable tablet 24 g (has no administration in time range)   glucagon (human recombinant) injection 1 mg (has no administration in time range)   enoxaparin injection 40 mg (40 mg Subcutaneous Given 11/15/24 2017)   prochlorperazine injection Soln 5 mg (has no administration in time range)   morphine injection 2 mg (2 mg Intravenous Given 11/15/24 2018)   insulin aspart U-100 pen 0-10 Units (has no administration in time range)   dextrose 10% bolus 125 mL 125 mL (has no administration in time range)   dextrose 10% bolus 250 mL 250 mL (has no administration in time range)   sodium chloride 0.9% bolus 1,000 mL 1,000 mL (1,000 mLs Intravenous New Bag 11/15/24 2017)     Medical Decision Making  69 y.o F with PMHx colon adenocarcinoma with mets to liver presenting from telemedicine oncology visit for possible inpatient chemotherapy. Vital signs stable, physical exam notable for jaundice, scleral icterus, RUQ abdominal pain. Differential including obstructive jaundice secondary to growing  tumor burden vs cholelithiasis. Labs significant for a bilirubin of 10.0, lactate of 17.83. No notable leukocytosis. Oncology consulted on presentation and on discussion will admit patient to oncology services for further workup and evaluation.    Amount and/or Complexity of Data Reviewed  Labs: ordered.    Risk  Decision regarding hospitalization.                                      Clinical Impression:  Final diagnoses:  [C18.9] Colon adenocarcinoma          ED Disposition Condition    Admit                 Mahad Scherer MD  Resident  11/15/24 2026

## 2024-11-16 NOTE — ED NOTES
Patient identifiers verified and correct for Tosin Ma  LOC: The patient is awake, alert and aware of environment with an appropriate affect, the patient is oriented x 3 and speaking appropriately.   APPEARANCE: Patient appears comfortable and in no acute distress, patient is clean and well groomed.  SKIN: The skin is warm and dry, color consistent with ethnicity, sclera yellow. patient has normal skin turgor and moist mucus membranes, skin intact, no breakdown or bruising noted.   MUSCULOSKELETAL: Patient moving all extremities spontaneously, no swelling noted.  RESPIRATORY: Airway is open and patent, respirations are spontaneous, patient has a normal effort and rate, no accessory muscle use noted, O2 sats noted at 99  % on room air.  CARDIAC: Denies chest pain   GASTRO: Denies nausea or emesis. Belly is soft but tender to touch. Last bowel movement this morning with blood  : Pt denies any pain or frequency with urination.  NEURO: AAOX4 Speech clear. Denies numbness or tingling to extremities

## 2024-11-16 NOTE — PLAN OF CARE
Problem: Adult Inpatient Plan of Care  Goal: Plan of Care Review  Outcome: Progressing     Problem: Adult Inpatient Plan of Care  Goal: Optimal Comfort and Wellbeing  Outcome: Progressing   VSS. Afebrile. Denies any pain or nausea. Aox4. No acute changes overnight. Bed alarm set and call light within reach.

## 2024-11-16 NOTE — NURSING
Blood glucose 54 @ 1102. Pt asymptomatic. Glucose tablets given for hypoglycemic management, MD aware. Blood glucose rechecked at 1215, resulted at 97.

## 2024-11-16 NOTE — HPI
Patient is a year old female past medical history of hypertension, hyperlipidemia, diabetes mellitus type 2, history of breast cancer, and stage IV colon cancer that presents the ER due to worsening abdominal pain, vomiting, and initiation of chemotherapy.  Patient reports that she has been having worsening abdominal pain and multiple episodes of vomiting over last 2-3 days.  Patient reports she usually does not vomit but over last 2-3 days she has been vomiting on average 3-4 times per day with nonbloody, nonbilious vomiting.  Due to this poor oral intake, patient feels very fatigued and generalized weakness.  Patient's abdominal pain localized to the right upper quadrant consistent with known metastatic disease.  Patient followed with her oncologist today who recommended that she come to ER for admission for further evaluation and management of stage IV colon cancer and initiation of chemotherapy.

## 2024-11-16 NOTE — ASSESSMENT & PLAN NOTE
Admitted to Oncology service.  PICC line ordered for initiation of chemotherapy.  Gentle IVF w/ NS @ 100 cc/hr.  Zofran and Phenergan for nausea PRN.  Oxycodone and Morphine for pain PRN.

## 2024-11-16 NOTE — PLAN OF CARE
POC reviewed with patient; understanding verbalized. C1D1 of FOLFOX. Accuchecks ACHS. PICC line placed this shift; CXR verification completed. 1x dose of PRN zofran given for nausea management.  to stay at bedside. Pt. with nonskid footwear on, bed in lowest position, and locked with bed rails up x2.  Pt. instructed to call prior to getting OOB.  Pt. has call light and personal items within reach. Patient ambulates in room independently. VSS and afebrile this shift. All questions and concerns addressed at this time.

## 2024-11-16 NOTE — CONSULTS
JORGE consulted for PICC insertion using modified Seldinger technique via u/s guidance:     Indication:Chemotherapy  Size:5 fr  Lumen:Double  Vein:right brachial   Cm in length:35  Arm circumference:30  Cm exposed:0   Lot#: [id number]    Image saved and uploaded to EMR

## 2024-11-17 LAB
ALBUMIN SERPL BCP-MCNC: 1.9 G/DL (ref 3.5–5.2)
ALP SERPL-CCNC: 1560 U/L (ref 40–150)
ALT SERPL W/O P-5'-P-CCNC: 76 U/L (ref 10–44)
ANION GAP SERPL CALC-SCNC: 29 MMOL/L (ref 8–16)
ANION GAP SERPL CALC-SCNC: 29 MMOL/L (ref 8–16)
AST SERPL-CCNC: 134 U/L (ref 10–40)
BASOPHILS # BLD AUTO: 0.01 K/UL (ref 0–0.2)
BASOPHILS NFR BLD: 0.1 % (ref 0–1.9)
BILIRUB SERPL-MCNC: 9.4 MG/DL (ref 0.1–1)
BUN SERPL-MCNC: 19 MG/DL (ref 8–23)
BUN SERPL-MCNC: 19 MG/DL (ref 8–23)
CALCIUM SERPL-MCNC: 9.1 MG/DL (ref 8.7–10.5)
CALCIUM SERPL-MCNC: 9.1 MG/DL (ref 8.7–10.5)
CHLORIDE SERPL-SCNC: 90 MMOL/L (ref 95–110)
CHLORIDE SERPL-SCNC: 90 MMOL/L (ref 95–110)
CO2 SERPL-SCNC: 13 MMOL/L (ref 23–29)
CO2 SERPL-SCNC: 13 MMOL/L (ref 23–29)
CREAT SERPL-MCNC: 0.8 MG/DL (ref 0.5–1.4)
CREAT SERPL-MCNC: 0.8 MG/DL (ref 0.5–1.4)
DIFFERENTIAL METHOD BLD: ABNORMAL
EOSINOPHIL # BLD AUTO: 0 K/UL (ref 0–0.5)
EOSINOPHIL NFR BLD: 0 % (ref 0–8)
ERYTHROCYTE [DISTWIDTH] IN BLOOD BY AUTOMATED COUNT: 17.4 % (ref 11.5–14.5)
EST. GFR  (NO RACE VARIABLE): >60 ML/MIN/1.73 M^2
EST. GFR  (NO RACE VARIABLE): >60 ML/MIN/1.73 M^2
GLUCOSE SERPL-MCNC: 195 MG/DL (ref 70–110)
GLUCOSE SERPL-MCNC: 195 MG/DL (ref 70–110)
HCT VFR BLD AUTO: 28 % (ref 37–48.5)
HGB BLD-MCNC: 9.1 G/DL (ref 12–16)
IMM GRANULOCYTES # BLD AUTO: 0.07 K/UL (ref 0–0.04)
IMM GRANULOCYTES NFR BLD AUTO: 0.8 % (ref 0–0.5)
LYMPHOCYTES # BLD AUTO: 1 K/UL (ref 1–4.8)
LYMPHOCYTES NFR BLD: 10.7 % (ref 18–48)
MCH RBC QN AUTO: 28.2 PG (ref 27–31)
MCHC RBC AUTO-ENTMCNC: 32.5 G/DL (ref 32–36)
MCV RBC AUTO: 87 FL (ref 82–98)
MONOCYTES # BLD AUTO: 0.3 K/UL (ref 0.3–1)
MONOCYTES NFR BLD: 3.7 % (ref 4–15)
NEUTROPHILS # BLD AUTO: 7.7 K/UL (ref 1.8–7.7)
NEUTROPHILS NFR BLD: 84.7 % (ref 38–73)
NRBC BLD-RTO: 0 /100 WBC
PLATELET # BLD AUTO: 656 K/UL (ref 150–450)
PMV BLD AUTO: 9.4 FL (ref 9.2–12.9)
POCT GLUCOSE: 163 MG/DL (ref 70–110)
POCT GLUCOSE: 215 MG/DL (ref 70–110)
POCT GLUCOSE: 217 MG/DL (ref 70–110)
POCT GLUCOSE: 238 MG/DL (ref 70–110)
POCT GLUCOSE: 257 MG/DL (ref 70–110)
POCT GLUCOSE: 84 MG/DL (ref 70–110)
POCT GLUCOSE: 90 MG/DL (ref 70–110)
POTASSIUM SERPL-SCNC: 3.4 MMOL/L (ref 3.5–5.1)
POTASSIUM SERPL-SCNC: 3.4 MMOL/L (ref 3.5–5.1)
PROT SERPL-MCNC: 6.5 G/DL (ref 6–8.4)
RBC # BLD AUTO: 3.23 M/UL (ref 4–5.4)
SODIUM SERPL-SCNC: 132 MMOL/L (ref 136–145)
SODIUM SERPL-SCNC: 132 MMOL/L (ref 136–145)
WBC # BLD AUTO: 9.03 K/UL (ref 3.9–12.7)

## 2024-11-17 PROCEDURE — 25000003 PHARM REV CODE 250

## 2024-11-17 PROCEDURE — S5010 5% DEXTROSE AND 0.45% SALINE: HCPCS

## 2024-11-17 PROCEDURE — 85025 COMPLETE CBC W/AUTO DIFF WBC: CPT

## 2024-11-17 PROCEDURE — 63600175 PHARM REV CODE 636 W HCPCS

## 2024-11-17 PROCEDURE — 25000003 PHARM REV CODE 250: Performed by: HOSPITALIST

## 2024-11-17 PROCEDURE — 63600175 PHARM REV CODE 636 W HCPCS: Performed by: INTERNAL MEDICINE

## 2024-11-17 PROCEDURE — 20600001 HC STEP DOWN PRIVATE ROOM

## 2024-11-17 PROCEDURE — 99233 SBSQ HOSP IP/OBS HIGH 50: CPT | Mod: ,,, | Performed by: HOSPITALIST

## 2024-11-17 PROCEDURE — 25000003 PHARM REV CODE 250: Performed by: INTERNAL MEDICINE

## 2024-11-17 PROCEDURE — 63600175 PHARM REV CODE 636 W HCPCS: Performed by: HOSPITALIST

## 2024-11-17 PROCEDURE — 80053 COMPREHEN METABOLIC PANEL: CPT

## 2024-11-17 RX ADMIN — POLYETHYLENE GLYCOL 3350 17 G: 17 POWDER, FOR SOLUTION ORAL at 08:11

## 2024-11-17 RX ADMIN — FLUOROURACIL 985 MG: 50 INJECTION, SOLUTION INTRAVENOUS at 08:11

## 2024-11-17 RX ADMIN — POTASSIUM BICARBONATE 30 MEQ: 391 TABLET, EFFERVESCENT ORAL at 11:11

## 2024-11-17 RX ADMIN — ENOXAPARIN SODIUM 40 MG: 40 INJECTION SUBCUTANEOUS at 05:11

## 2024-11-17 RX ADMIN — PROCHLORPERAZINE EDISYLATE 5 MG: 5 INJECTION INTRAMUSCULAR; INTRAVENOUS at 05:11

## 2024-11-17 RX ADMIN — NIFEDIPINE 60 MG: 60 TABLET, FILM COATED, EXTENDED RELEASE ORAL at 08:11

## 2024-11-17 RX ADMIN — DEXTROSE AND SODIUM CHLORIDE: 5; 450 INJECTION, SOLUTION INTRAVENOUS at 05:11

## 2024-11-17 RX ADMIN — CYPROHEPTADINE HYDROCHLORIDE 4 MG: 4 TABLET ORAL at 08:11

## 2024-11-17 RX ADMIN — SODIUM CHLORIDE, POTASSIUM CHLORIDE, SODIUM LACTATE AND CALCIUM CHLORIDE 1000 ML: 600; 310; 30; 20 INJECTION, SOLUTION INTRAVENOUS at 09:11

## 2024-11-17 RX ADMIN — CYPROHEPTADINE HYDROCHLORIDE 4 MG: 4 TABLET ORAL at 02:11

## 2024-11-17 RX ADMIN — ONDANSETRON 8 MG: 2 INJECTION INTRAMUSCULAR; INTRAVENOUS at 02:11

## 2024-11-17 RX ADMIN — OLANZAPINE 5 MG: 2.5 TABLET, FILM COATED ORAL at 08:11

## 2024-11-17 NOTE — HOSPITAL COURSE
Initiated on C1D1 of FOLFOX . Poor PO intake occasionally hypoglycemic requiring D5 mildly hyperglycemic following initiation of steroids, D5 d/c. Hyperglycemia improved following cessation of D5. Mild hypokalemia requiring supplementation. Nausea and vomiting overnight 11/17, relieved with anti-emetics. Completing therapy evening of 11/18, with plans to discharge morning of 11/19. Will follow up with interventional radiology outpatient for port placement for future chemotherapy.

## 2024-11-17 NOTE — PLAN OF CARE
POC reviewed with patient; understanding verbalized. C1D2 of FOLFOX. 1L bolus of LR given this shift; pt tolerated well. PO potassium replaced this shift. D5 infusion d/c'd. Accu checks ACHS. Pt voids independently via hat.  to remain at bedside. Pt. with nonskid footwear on, bed in lowest position, and locked with bed rails up x2.  Pt. instructed to call prior to getting OOB.  Pt. has call light and personal items within reach. Patient ambulates in room independently. VSS and afebrile this shift. All questions and concerns addressed at this time.

## 2024-11-17 NOTE — PLAN OF CARE
POC reviewed with patient at beginning of shift and PRN; understanding verbalized. C1D2 of FOLFOX. Accuchecks  q3H  Pt. with nonskid footwear on, bed in lowest position, and locked with bed rails up x2.  Pt. instructed to call prior to getting OOB.  Pt. has call light and personal items within reach. Patient ambulates in room independently. VSS and afebrile this marilynn

## 2024-11-17 NOTE — SUBJECTIVE & OBJECTIVE
Interval History:     Oncology Treatment Plan:   OP GI mFOLFOX6 (oxaliplatin leucovorin fluorouracil) Q2W    Medications:  Continuous Infusions:  Scheduled Meds:   cyproheptadine  4 mg Oral TID    enoxparin  40 mg Subcutaneous Daily    fluorouraciL (ADRUCIL) 600 mg/m2 = 985 mg in 0.9% NaCl 119.7 mL chemo infusion  600 mg/m2 Intravenous Once    fluorouraciL (ADRUCIL) 600 mg/m2 = 985 mg in 0.9% NaCl 119.7 mL chemo infusion  600 mg/m2 Intravenous Once    NIFEdipine  60 mg Oral Daily    OLANZapine  5 mg Oral QHS    ondansetron  8 mg Intravenous Q12H    polyethylene glycol  17 g Oral Daily     PRN Meds:  Current Facility-Administered Medications:     acetaminophen, 650 mg, Oral, Q4H PRN    alteplase, 2 mg, Intra-Catheter, PRN    dextrose 10%, 12.5 g, Intravenous, PRN    dextrose 10%, 25 g, Intravenous, PRN    dicyclomine, 10 mg, Oral, TID AC PRN    diphenhydrAMINE, 50 mg, Intravenous, Once PRN    EPINEPHrine, 0.3 mg, Intramuscular, Once PRN    glucagon (human recombinant), 1 mg, Intramuscular, PRN    glucose, 16 g, Oral, PRN    glucose, 24 g, Oral, PRN    heparin, porcine (PF), 500 Units, Intravenous, PRN    hydrocortisone sodium succinate, 100 mg, Intravenous, Once PRN    insulin aspart U-100, 0-10 Units, Subcutaneous, QID (AC + HS) PRN    melatonin, 6 mg, Oral, Nightly PRN    morphine, 2 mg, Intravenous, Q4H PRN    naloxone, 0.02 mg, Intravenous, PRN    oxyCODONE, 5 mg, Oral, Q6H PRN    prochlorperazine, 5 mg, Intravenous, Q6H PRN    prochlorperazine, 5 mg, Intravenous, Q6H PRN    senna-docusate 8.6-50 mg, 1 tablet, Oral, BID PRN    sodium chloride 0.9%, 10 mL, Intravenous, Q8H PRN    Flushing PICC/Midline Protocol, , , Until Discontinued **AND** sodium chloride 0.9%, 10 mL, Intravenous, Q12H PRN    sodium chloride 0.9%, 10 mL, Intravenous, PRN     Review of Systems  Objective:     Vital Signs (Most Recent):  Temp: 97.5 °F (36.4 °C) (11/17/24 0715)  Pulse: 85 (11/17/24 0715)  Resp: 18 (11/17/24 0715)  BP: 110/67  (11/17/24 0715)  SpO2: 98 % (11/17/24 0715) Vital Signs (24h Range):  Temp:  [97.4 °F (36.3 °C)-98.4 °F (36.9 °C)] 97.5 °F (36.4 °C)  Pulse:  [] 85  Resp:  [16-20] 18  SpO2:  [95 %-100 %] 98 %  BP: (110-123)/(64-78) 110/67     Weight: 62.4 kg (137 lb 9.1 oz)  Body mass index is 25.99 kg/m².  Body surface area is 1.64 meters squared.      Intake/Output Summary (Last 24 hours) at 11/17/2024 0836  Last data filed at 11/17/2024 0643  Gross per 24 hour   Intake 1678.54 ml   Output --   Net 1678.54 ml        Physical Exam  Constitutional:       General: She is not in acute distress.     Appearance: Normal appearance. She is ill-appearing.   HENT:      Head: Normocephalic and atraumatic.   Eyes:      General: Scleral icterus present.      Extraocular Movements: Extraocular movements intact.   Cardiovascular:      Rate and Rhythm: Normal rate.   Pulmonary:      Effort: Pulmonary effort is normal. No tachypnea or respiratory distress.   Abdominal:      General: Abdomen is flat. Bowel sounds are decreased. There is no distension.      Tenderness: There is abdominal tenderness in the right upper quadrant. There is no guarding or rebound.   Musculoskeletal:         General: Normal range of motion.      Cervical back: Normal range of motion and neck supple.   Skin:     General: Skin is dry.   Neurological:      Mental Status: She is alert and oriented to person, place, and time.   Psychiatric:         Attention and Perception: Attention and perception normal.         Mood and Affect: Mood and affect normal.          Significant Labs:   All pertinent labs from the last 24 hours have been reviewed.    Diagnostic Results:  I have reviewed and interpreted all pertinent imaging results/findings within the past 24 hours.

## 2024-11-17 NOTE — NURSING
Administered  fluorouraciL (ADRUCIL) 600 mg/m2 = 985 mg in 0.9% NaCl 119.7 mL chemo infusion over 23 hours and    oxaliplatin (ELOXATIN) 85 mg/m2 = 139 mg in D5W 592.8 mL chemo infusion  over 2hours with leucovourin infusion concurrently To ROCÍO PICC noted + for blood return. Chemotherapy consent signed and on chart. Chemotherapy dosage and BSA checked by two chemotherapy certified nurses prior to administration. Premedications  given prior to infusion. Chemotherapy education performed with pt and family; verbalized understanding. Chemotherapeutic precautions in place throughout therapy.  Will continue to monitor.

## 2024-11-17 NOTE — ASSESSMENT & PLAN NOTE
Patient's FSGs are controlled on current medication regimen.  Last A1c reviewed-   Lab Results   Component Value Date    HGBA1C 4.8 11/04/2024     POCT glucose low 11/16 AM requiring oral glucose tabs then D5. Became hyperglycemic while on D5 and steroids. Glucose improving. Has had poor PO intake.

## 2024-11-17 NOTE — PROGRESS NOTES
[facility] - Oncology ([facility])  Hematology/Oncology  Progress Note    Patient Name: Tosin Ma  Admission Date: 11/15/2024  Hospital Length of Stay: 2 days  Code Status: Full Code     Subjective:     HPI:  Ms Ma is a 68 yo woman with HTN, HLD, history of breast cancer, T2DM. She presented with abdominal pain and bloating worsened with eating or drinking, along with frequent bowel movements after eating. RUQ pain radiating to the back. She was noted to have elevated LFT and was sent to ED. Bilirubin was noted to be 6.7, alk phos 830, , ALT 80. CT abdomen/pelvis on 11/4/24 showed Mid descending colon 4 cm soft tissue mass concerning for primary malignancy. Hepatomegaly with multiple hypoattenuating hepatic masses consistent with metastatic disease given aforementioned findings. Multiple small pulmonary nodules also concerning for metastatic disease. Postsurgical changes and additional findings as detailed above. liver biopsy on 11/5/2024 showed adenocarcinoma with mucinous features. Positive for CDX2, CK20, CK7, negative for TTF1, ER, PAX8, GATA3. Primary site include GI tract, pancreatobiliary tract and among others. MMR-proficient. Tempus tissue pending. Patient presents today for further management. Feeling poorly. +anorexia. Throws up after she eats. Can only tolerate ice cream. +weight loss. +abdominal pain. +jaundice. +weakness.     Pt is being admitted to our service for inpatient chemo in setting of colon cancer with liver mets causing rapidly increasing bilirubin.     Interval History:     Oncology Treatment Plan:   OP GI mFOLFOX6 (oxaliplatin leucovorin fluorouracil) Q2W    Medications:  Continuous Infusions:  Scheduled Meds:   cyproheptadine  4 mg Oral TID    enoxparin  40 mg Subcutaneous Daily    fluorouraciL (ADRUCIL) 600 mg/m2 = 985 mg in 0.9% NaCl 119.7 mL chemo infusion  600 mg/m2 Intravenous Once    fluorouraciL (ADRUCIL) 600 mg/m2 = 985 mg in 0.9% NaCl 119.7 mL chemo infusion  600  mg/m2 Intravenous Once    NIFEdipine  60 mg Oral Daily    OLANZapine  5 mg Oral QHS    ondansetron  8 mg Intravenous Q12H    polyethylene glycol  17 g Oral Daily     PRN Meds:  Current Facility-Administered Medications:     acetaminophen, 650 mg, Oral, Q4H PRN    alteplase, 2 mg, Intra-Catheter, PRN    dextrose 10%, 12.5 g, Intravenous, PRN    dextrose 10%, 25 g, Intravenous, PRN    dicyclomine, 10 mg, Oral, TID AC PRN    diphenhydrAMINE, 50 mg, Intravenous, Once PRN    EPINEPHrine, 0.3 mg, Intramuscular, Once PRN    glucagon (human recombinant), 1 mg, Intramuscular, PRN    glucose, 16 g, Oral, PRN    glucose, 24 g, Oral, PRN    heparin, porcine (PF), 500 Units, Intravenous, PRN    hydrocortisone sodium succinate, 100 mg, Intravenous, Once PRN    insulin aspart U-100, 0-10 Units, Subcutaneous, QID (AC + HS) PRN    melatonin, 6 mg, Oral, Nightly PRN    morphine, 2 mg, Intravenous, Q4H PRN    naloxone, 0.02 mg, Intravenous, PRN    oxyCODONE, 5 mg, Oral, Q6H PRN    prochlorperazine, 5 mg, Intravenous, Q6H PRN    prochlorperazine, 5 mg, Intravenous, Q6H PRN    senna-docusate 8.6-50 mg, 1 tablet, Oral, BID PRN    sodium chloride 0.9%, 10 mL, Intravenous, Q8H PRN    Flushing PICC/Midline Protocol, , , Until Discontinued **AND** sodium chloride 0.9%, 10 mL, Intravenous, Q12H PRN    sodium chloride 0.9%, 10 mL, Intravenous, PRN     Review of Systems  Objective:     Vital Signs (Most Recent):  Temp: 97.5 °F (36.4 °C) (11/17/24 0715)  Pulse: 85 (11/17/24 0715)  Resp: 18 (11/17/24 0715)  BP: 110/67 (11/17/24 0715)  SpO2: 98 % (11/17/24 0715) Vital Signs (24h Range):  Temp:  [97.4 °F (36.3 °C)-98.4 °F (36.9 °C)] 97.5 °F (36.4 °C)  Pulse:  [] 85  Resp:  [16-20] 18  SpO2:  [95 %-100 %] 98 %  BP: (110-123)/(64-78) 110/67     Weight: 62.4 kg (137 lb 9.1 oz)  Body mass index is 25.99 kg/m².  Body surface area is 1.64 meters squared.      Intake/Output Summary (Last 24 hours) at 11/17/2024 0836  Last data filed at 11/17/2024  0643  Gross per 24 hour   Intake 1678.54 ml   Output --   Net 1678.54 ml        Physical Exam  Constitutional:       General: She is not in acute distress.     Appearance: Normal appearance. She is ill-appearing.   HENT:      Head: Normocephalic and atraumatic.   Eyes:      General: Scleral icterus present.      Extraocular Movements: Extraocular movements intact.   Cardiovascular:      Rate and Rhythm: Normal rate.   Pulmonary:      Effort: Pulmonary effort is normal. No tachypnea or respiratory distress.   Abdominal:      General: Abdomen is flat. Bowel sounds are decreased. There is no distension.      Tenderness: There is abdominal tenderness in the right upper quadrant. There is no guarding or rebound.   Musculoskeletal:         General: Normal range of motion.      Cervical back: Normal range of motion and neck supple.   Skin:     General: Skin is dry.   Neurological:      Mental Status: She is alert and oriented to person, place, and time.   Psychiatric:         Attention and Perception: Attention and perception normal.         Mood and Affect: Mood and affect normal.          Significant Labs:   All pertinent labs from the last 24 hours have been reviewed.    Diagnostic Results:  I have reviewed and interpreted all pertinent imaging results/findings within the past 24 hours.  Assessment/Plan:     * Colon adenocarcinoma  Pt admitted to service for inpatient chemo initiation due to rapidly increasing bilirubin.     Plan  - Initiate chemo    Metastasis to liver    CT 11/04: Hepatomegaly with multiple hypoattenuating hepatic masses consistent with metastatic disease   On admission ALP 1,549 with bilirubin 10.0 with scleral icterus. Hepatic metastatic lesions likely etiology causing obstruction. Causing right sided abdominal pain and nausea/emesis. Will initiate FOLFOX 11/16 inpatient in hopes of reducing size of hepatic lesions. No increased leukocytosis, hypotension, low suspicion for cholangitis at this  time.fluorouraciLchemo infusion over 23 hours and oxaliplatin  2hours with leucovourin infusion concurrently initiated 11/16 night at 2033. Therapy for 46 hours     - FU bilirubin     Prochlorperazine 5 mg for nausea  oxyCODONE immediate release tablet 5 mg  q6 prn for pain                Essential hypertension  Vitals:    11/15/24 1656 11/15/24 1900 11/15/24 2000 11/15/24 2100   BP: (!) 142/74 126/73 133/76 133/75    11/15/24 2227 11/16/24 0416 11/16/24 0728 11/16/24 1223   BP: (!) 165/84 127/70 117/74 119/78      Hold home nifedipine     Type 2 diabetes mellitus with hyperglycemia, without long-term current use of insulin  Patient's FSGs are controlled on current medication regimen.  Last A1c reviewed-   Lab Results   Component Value Date    HGBA1C 4.8 11/04/2024     POCT glucose low 11/16 AM requiring oral glucose tabs then D5. Became hyperglycemic while on D5 and steroids. Glucose improving. Has had poor PO intake.               Lewis Stewart MD  Hematology/Oncology  [facility] - Oncology ([facility])

## 2024-11-17 NOTE — ASSESSMENT & PLAN NOTE
CT 11/04: Hepatomegaly with multiple hypoattenuating hepatic masses consistent with metastatic disease   On admission ALP 1,549 with bilirubin 10.0 with scleral icterus. Hepatic metastatic lesions likely etiology causing obstruction. Causing right sided abdominal pain and nausea/emesis. Will initiate FOLFOX 11/16 inpatient in hopes of reducing size of hepatic lesions. No increased leukocytosis, hypotension, low suspicion for cholangitis at this time.fluorouraciLchemo infusion over 23 hours and oxaliplatin  2hours with leucovourin infusion concurrently initiated 11/16 night at 2033. Therapy for 46 hours     - FU bilirubin     Prochlorperazine 5 mg for nausea  oxyCODONE immediate release tablet 5 mg  q6 prn for pain

## 2024-11-18 LAB
ANION GAP SERPL CALC-SCNC: 29 MMOL/L (ref 8–16)
BASOPHILS # BLD AUTO: 0.03 K/UL (ref 0–0.2)
BASOPHILS NFR BLD: 0.3 % (ref 0–1.9)
BUN SERPL-MCNC: 21 MG/DL (ref 8–23)
CALCIUM SERPL-MCNC: 9.1 MG/DL (ref 8.7–10.5)
CHLORIDE SERPL-SCNC: 93 MMOL/L (ref 95–110)
CO2 SERPL-SCNC: 11 MMOL/L (ref 23–29)
CREAT SERPL-MCNC: 0.9 MG/DL (ref 0.5–1.4)
DIFFERENTIAL METHOD BLD: ABNORMAL
EOSINOPHIL # BLD AUTO: 0 K/UL (ref 0–0.5)
EOSINOPHIL NFR BLD: 0.2 % (ref 0–8)
ERYTHROCYTE [DISTWIDTH] IN BLOOD BY AUTOMATED COUNT: 18 % (ref 11.5–14.5)
EST. GFR  (NO RACE VARIABLE): >60 ML/MIN/1.73 M^2
GLUCOSE SERPL-MCNC: 108 MG/DL (ref 70–110)
HCT VFR BLD AUTO: 26.5 % (ref 37–48.5)
HGB BLD-MCNC: 8.7 G/DL (ref 12–16)
IMM GRANULOCYTES # BLD AUTO: 0.07 K/UL (ref 0–0.04)
IMM GRANULOCYTES NFR BLD AUTO: 0.6 % (ref 0–0.5)
LYMPHOCYTES # BLD AUTO: 1.2 K/UL (ref 1–4.8)
LYMPHOCYTES NFR BLD: 9.8 % (ref 18–48)
MCH RBC QN AUTO: 28.6 PG (ref 27–31)
MCHC RBC AUTO-ENTMCNC: 32.8 G/DL (ref 32–36)
MCV RBC AUTO: 87 FL (ref 82–98)
MONOCYTES # BLD AUTO: 0.7 K/UL (ref 0.3–1)
MONOCYTES NFR BLD: 5.9 % (ref 4–15)
NEUTROPHILS # BLD AUTO: 9.9 K/UL (ref 1.8–7.7)
NEUTROPHILS NFR BLD: 83.2 % (ref 38–73)
NRBC BLD-RTO: 0 /100 WBC
PLATELET # BLD AUTO: 642 K/UL (ref 150–450)
PMV BLD AUTO: 9.7 FL (ref 9.2–12.9)
POCT GLUCOSE: 61 MG/DL (ref 70–110)
POCT GLUCOSE: 68 MG/DL (ref 70–110)
POCT GLUCOSE: 76 MG/DL (ref 70–110)
POCT GLUCOSE: 77 MG/DL (ref 70–110)
POCT GLUCOSE: 89 MG/DL (ref 70–110)
POTASSIUM SERPL-SCNC: 3.5 MMOL/L (ref 3.5–5.1)
RBC # BLD AUTO: 3.04 M/UL (ref 4–5.4)
SODIUM SERPL-SCNC: 133 MMOL/L (ref 136–145)
WBC # BLD AUTO: 11.92 K/UL (ref 3.9–12.7)

## 2024-11-18 PROCEDURE — 25000003 PHARM REV CODE 250: Performed by: INTERNAL MEDICINE

## 2024-11-18 PROCEDURE — 20600001 HC STEP DOWN PRIVATE ROOM

## 2024-11-18 PROCEDURE — 99233 SBSQ HOSP IP/OBS HIGH 50: CPT | Mod: ,,, | Performed by: HOSPITALIST

## 2024-11-18 PROCEDURE — 80048 BASIC METABOLIC PNL TOTAL CA: CPT

## 2024-11-18 PROCEDURE — 63600175 PHARM REV CODE 636 W HCPCS: Performed by: INTERNAL MEDICINE

## 2024-11-18 PROCEDURE — 63600175 PHARM REV CODE 636 W HCPCS: Performed by: HOSPITALIST

## 2024-11-18 PROCEDURE — 25000003 PHARM REV CODE 250: Performed by: NURSE PRACTITIONER

## 2024-11-18 PROCEDURE — 25000003 PHARM REV CODE 250: Performed by: HOSPITALIST

## 2024-11-18 PROCEDURE — 85025 COMPLETE CBC W/AUTO DIFF WBC: CPT

## 2024-11-18 RX ORDER — DICYCLOMINE HYDROCHLORIDE 10 MG/1
10 CAPSULE ORAL
Qty: 40 CAPSULE | Refills: 0 | Status: ON HOLD | OUTPATIENT
Start: 2024-11-18 | End: 2024-12-02

## 2024-11-18 RX ORDER — LIDOCAINE AND PRILOCAINE 25; 25 MG/G; MG/G
CREAM TOPICAL ONCE
Qty: 30 G | Refills: 0 | Status: SHIPPED | OUTPATIENT
Start: 2024-11-18 | End: 2024-11-20

## 2024-11-18 RX ORDER — MUPIROCIN 20 MG/G
OINTMENT TOPICAL 2 TIMES DAILY
Status: DISCONTINUED | OUTPATIENT
Start: 2024-11-18 | End: 2024-11-19 | Stop reason: HOSPADM

## 2024-11-18 RX ADMIN — CYPROHEPTADINE HYDROCHLORIDE 4 MG: 4 TABLET ORAL at 09:11

## 2024-11-18 RX ADMIN — OLANZAPINE 5 MG: 2.5 TABLET, FILM COATED ORAL at 09:11

## 2024-11-18 RX ADMIN — ONDANSETRON 8 MG: 2 INJECTION INTRAMUSCULAR; INTRAVENOUS at 02:11

## 2024-11-18 RX ADMIN — NIFEDIPINE 60 MG: 60 TABLET, FILM COATED, EXTENDED RELEASE ORAL at 08:11

## 2024-11-18 RX ADMIN — CYPROHEPTADINE HYDROCHLORIDE 4 MG: 4 TABLET ORAL at 02:11

## 2024-11-18 RX ADMIN — MUPIROCIN: 20 OINTMENT TOPICAL at 09:11

## 2024-11-18 RX ADMIN — OXYCODONE 5 MG: 5 TABLET ORAL at 02:11

## 2024-11-18 RX ADMIN — ENOXAPARIN SODIUM 40 MG: 40 INJECTION SUBCUTANEOUS at 05:11

## 2024-11-18 RX ADMIN — Medication 16 G: at 05:11

## 2024-11-18 RX ADMIN — POLYETHYLENE GLYCOL 3350 17 G: 17 POWDER, FOR SOLUTION ORAL at 08:11

## 2024-11-18 RX ADMIN — CYPROHEPTADINE HYDROCHLORIDE 4 MG: 4 TABLET ORAL at 08:11

## 2024-11-18 NOTE — ASSESSMENT & PLAN NOTE
Vitals:    11/17/24 0715 11/17/24 1133 11/17/24 1631 11/17/24 1908   BP: 110/67 115/66 120/69 (!) 116/57    11/17/24 2333 11/18/24 0446 11/18/24 0735 11/18/24 0857   BP: 118/69 111/65 107/68 112/68    11/18/24 0900 11/18/24 1250   BP: 112/68 119/72      - Home nifedipine

## 2024-11-18 NOTE — ASSESSMENT & PLAN NOTE
CT 11/04: Hepatomegaly with multiple hypoattenuating hepatic masses consistent with metastatic disease   On admission ALP 1,549 with bilirubin 10.0 with scleral icterus. Hepatic metastatic lesions likely etiology causing obstruction. Causing right sided abdominal pain and nausea/emesis. Will initiate FOLFOX 11/16 inpatient in hopes of reducing size of hepatic lesions. No increased leukocytosis, hypotension, low suspicion for cholangitis at this time.fluorouraciLchemo infusion over 23 hours and oxaliplatin  2hours with leucovourin infusion concurrently initiated 11/16 night at 2033. Therapy for 46 hours     - F/U bilirubin with daily CMP  - Complete chemotherapy tonight 11/18  - Zofran 8mg BID  - Olanzapine 5mg qhs x 3 days  - Prochlorperazine 5 mg q6h PRN for nausea  - Oxycodone 5 mg q6h PRN for moderate pain  - Morphine 2mg q4h PRN for severe pain

## 2024-11-18 NOTE — SUBJECTIVE & OBJECTIVE
Interval History: One episode of emesis overnight from drinking too much cranberry juice at once. Does not endorse further nausea, encouraged to take PRN anti-emetics even with mild nausea symptoms.    Oncology Treatment Plan:   OP GI mFOLFOX6 (oxaliplatin leucovorin fluorouracil) Q2W    Medications:  Continuous Infusions:  Scheduled Meds:   cyproheptadine  4 mg Oral TID    enoxparin  40 mg Subcutaneous Daily    fluorouraciL (ADRUCIL) 600 mg/m2 = 985 mg in 0.9% NaCl 119.7 mL chemo infusion  600 mg/m2 Intravenous Once    mupirocin   Nasal BID    NIFEdipine  60 mg Oral Daily    OLANZapine  5 mg Oral QHS    ondansetron  8 mg Intravenous Q12H    polyethylene glycol  17 g Oral Daily     PRN Meds:  Current Facility-Administered Medications:     acetaminophen, 650 mg, Oral, Q4H PRN    alteplase, 2 mg, Intra-Catheter, PRN    dextrose 10%, 12.5 g, Intravenous, PRN    dextrose 10%, 25 g, Intravenous, PRN    dicyclomine, 10 mg, Oral, TID AC PRN    diphenhydrAMINE, 50 mg, Intravenous, Once PRN    EPINEPHrine, 0.3 mg, Intramuscular, Once PRN    glucagon (human recombinant), 1 mg, Intramuscular, PRN    glucose, 16 g, Oral, PRN    glucose, 24 g, Oral, PRN    heparin, porcine (PF), 500 Units, Intravenous, PRN    hydrocortisone sodium succinate, 100 mg, Intravenous, Once PRN    insulin aspart U-100, 0-10 Units, Subcutaneous, QID (AC + HS) PRN    melatonin, 6 mg, Oral, Nightly PRN    morphine, 2 mg, Intravenous, Q4H PRN    naloxone, 0.02 mg, Intravenous, PRN    oxyCODONE, 5 mg, Oral, Q6H PRN    prochlorperazine, 5 mg, Intravenous, Q6H PRN    senna-docusate 8.6-50 mg, 1 tablet, Oral, BID PRN    sodium chloride 0.9%, 10 mL, Intravenous, Q8H PRN    Flushing PICC/Midline Protocol, , , Until Discontinued **AND** sodium chloride 0.9%, 10 mL, Intravenous, Q12H PRN    sodium chloride 0.9%, 10 mL, Intravenous, PRN     Review of Systems   Constitutional:  Negative for chills and fever.   Respiratory:  Negative for cough and shortness of  breath.    Cardiovascular:  Negative for chest pain and leg swelling.   Gastrointestinal:  Positive for abdominal pain and vomiting. Negative for abdominal distention.        Last BM 11/15, but endorses passing flatus   Genitourinary: Negative.    Musculoskeletal: Negative.      Objective:     Vital Signs (Most Recent):  Temp: 98.1 °F (36.7 °C) (11/18/24 1250)  Pulse: 105 (11/18/24 1250)  Resp: 20 (11/18/24 1250)  BP: 119/72 (11/18/24 1250)  SpO2: 98 % (11/18/24 1250) Vital Signs (24h Range):  Temp:  [97.4 °F (36.3 °C)-98.2 °F (36.8 °C)] 98.1 °F (36.7 °C)  Pulse:  [] 105  Resp:  [16-20] 20  SpO2:  [95 %-100 %] 98 %  BP: (107-120)/(57-72) 119/72     Weight: 62.4 kg (137 lb 9.1 oz)  Body mass index is 25.99 kg/m².  Body surface area is 1.64 meters squared.      Intake/Output Summary (Last 24 hours) at 11/18/2024 1343  Last data filed at 11/18/2024 1136  Gross per 24 hour   Intake 1597.42 ml   Output 700 ml   Net 897.42 ml        Physical Exam  Vitals and nursing note reviewed.   Constitutional:       General: She is not in acute distress.     Appearance: Normal appearance.   HENT:      Head: Normocephalic and atraumatic.   Eyes:      General: Scleral icterus present.      Extraocular Movements: Extraocular movements intact.   Cardiovascular:      Rate and Rhythm: Normal rate and regular rhythm.      Heart sounds: Normal heart sounds.   Pulmonary:      Effort: Pulmonary effort is normal. No tachypnea or respiratory distress.   Abdominal:      General: Abdomen is flat. Bowel sounds are decreased. There is no distension.      Tenderness: There is abdominal tenderness. There is no guarding or rebound.   Musculoskeletal:         General: Normal range of motion.      Cervical back: Normal range of motion and neck supple.      Right lower leg: No edema.      Left lower leg: No edema.   Skin:     General: Skin is warm and dry.   Neurological:      Mental Status: She is alert and oriented to person, place, and time.       Cranial Nerves: No cranial nerve deficit.      Motor: No weakness.   Psychiatric:         Attention and Perception: Attention and perception normal.         Mood and Affect: Mood and affect normal.          Significant Labs:   All pertinent labs from the last 24 hours have been reviewed.    Diagnostic Results:  I have reviewed all pertinent imaging results/findings within the past 24 hours.

## 2024-11-18 NOTE — CARE UPDATE
Unit [facility] Care Support Interaction      I have reviewed the chart of Tosin Ma who is hospitalized for Colon adenocarcinoma. The patient is currently located in the following unit: ONC/BMT        I have assisted the primary physician in management of the following:      MRSA Decolonization - Mupirocin ordered and CHG ordered        BERENICE YOUNG  Unit Based [facility]

## 2024-11-18 NOTE — PROGRESS NOTES
Admit Assessment    Patient Identification  Tosin Ma   :  1955  Admit Date:  11/15/2024  Attending Provider:  Khris Ocampo IV, MD              Referral:   Pt was admitted to ED with a diagnosis of Chest pain [R07.9], and was admitted to med/onc this hospital stay due to Colon adenocarcinoma [C18.9].   is involved in this case. Pt was referred to the Social Work Department via routine referral.     Patient presents as a 69 y.o. year old  female.    Persons interviewed with patient's permission: daughter(s) and .    Living Situation:    Pt and  reside at [address] [place] [address], phone: [phone] (home).    This is a single story home.    Pt and her  have a son (Brandon [phone]), who lives in [place], and a daughter (Florence [phone]), who lives locally and is very involved in patient's care.    Per pt's chart:  (RETIRED) Functional Status Prior  Ambulation Prior: 0-->independent  Transferrin-->independent  Toiletin-->independent  Bathin-->independent  Dressin-->independent  Eatin-->independent  Communication: understands/communicates without difficulty  Swallowing: swallows foods/liquids without difficulty    Current or Past Agencies and Description of Services/Supplies    DME  Agency Name: none used previously  Agency Phone Number: none  Pt requests a shower chair upon discharge.       Home Health  Agency Name: none  Agency Phone Number: none  Services: none    IV Infusion  Agency Name: none  Agency Phone Number: none    Nutrition:   Pt has type 2 diabetes that is controlled. She checks her glucose at home.    Outpatient Pharmacy:     [facility]  [id number] [facility] [address]  Phone: [phone] Fax: [phone]    [facility]   [address] [place] [id number]  Phone: [phone] Fax: [phone]    Patient Preference of agencies include:  None  specified    Patient/Caregiver informed of right to choose providers or agencies.  Patient provides permission to release any necessary information to [facility] and to Non[facility] agencies as needed to facilitate patient care, treatment planning, and patient discharge planning.  Written and verbal resources provided.      Coping  Pt and family seem to be coping well. During this visit, pt was accompanied by her daughter and her spouse. Pt and family stated that they feel very supported by friends and family. They said they have spiritual/Orthodoxy practices, but specifics were not mentioned. When feeling better, pt enjoys watching TV (particularly the [employer]), and going to [employer]. For her recent birthday, she went to [employer]. 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    Adjustment to Diagnosis and Treatment  No issues noted at this time.    Emotional/Behavioral/Cognitive Issues  No issues at this time.    History/Current Symptoms of Anxiety/Depression:   No    Per pt's chart:  History/Current Substance Use:   Social History     Tobacco Use    Smoking status: Never    Smokeless tobacco: Never   Substance and Sexual Activity    Alcohol use: No    Drug use: No    Sexual activity: Yes     Partners: Male     Birth control/protection: See Surgical Hx       Per pt's chart:  Indications of Abuse/Neglect:   No  Abuse Screen (yes response referral indicated)  Feels Unsafe at Home or Work/School: no  Feels Threatened by Someone: no  Does anyone try to keep you from having contact with others or doing things outside your home?: no  Physical Signs of Abuse Present: no    Financial:  Payer/Plan Subscr  Sex Relation Sub. Ins. ID Effective Group Num   1. BLUE CROSS BL* WASHBURNZAKIYA 1955 Female Self [id number]                                   [address] [address]       Other identified concerns/needs:  Aside from requesting a shower chair, pt will  need care at home for either her PICC line, or a port, if that is placed prior to going home. Pt said the port might be put in outpatient. Pt's daughter, Florence, said she will do the care. Pt's nurse was present at the time of this interview and explained to Florence that she will train her how to flush the PICC and change dressings before pt goes home.    Plan:    Interventions/Referrals:  None at this time    Patient/caregiver engaged in treatment planning process.     is providing psychosocial and supportive counseling, resources, education, assistance and discharge planning as appropriate.  Patient/caregiver stated understanding of 's available resources.  is following this case and remains available.    SW left pt and her daughter with business cards, so they can contact SW as needed in the future.    Christo Martinez, [facility]  Oncology Social Worker  [facility]  [phone]

## 2024-11-18 NOTE — PROGRESS NOTES
[facility] - Oncology ([facility])  Hematology/Oncology  Progress Note    Patient Name: Tosin Ma  Admission Date: 11/15/2024  Hospital Length of Stay: 3 days  Code Status: Full Code     Subjective:     HPI:  Ms Ma is a 70 yo woman with HTN, HLD, history of breast cancer, T2DM. She presented with abdominal pain and bloating worsened with eating or drinking, along with frequent bowel movements after eating. RUQ pain radiating to the back. She was noted to have elevated LFT and was sent to ED. Bilirubin was noted to be 6.7, alk phos 830, , ALT 80. CT abdomen/pelvis on 11/4/24 showed Mid descending colon 4 cm soft tissue mass concerning for primary malignancy. Hepatomegaly with multiple hypoattenuating hepatic masses consistent with metastatic disease given aforementioned findings. Multiple small pulmonary nodules also concerning for metastatic disease. Postsurgical changes and additional findings as detailed above. liver biopsy on 11/5/2024 showed adenocarcinoma with mucinous features. Positive for CDX2, CK20, CK7, negative for TTF1, ER, PAX8, GATA3. Primary site include GI tract, pancreatobiliary tract and among others. MMR-proficient. Tempus tissue pending. Patient presents today for further management. Feeling poorly. +anorexia. Throws up after she eats. Can only tolerate ice cream. +weight loss. +abdominal pain. +jaundice. +weakness.     Pt is being admitted to our service for inpatient chemo in setting of colon cancer with liver mets causing rapidly increasing bilirubin.     Interval History: One episode of emesis overnight from drinking too much cranberry juice at once. Does not endorse further nausea, encouraged to take PRN anti-emetics even with mild nausea symptoms.    Oncology Treatment Plan:   OP GI [id number] (oxaliplatin leucovorin fluorouracil) Q2W    Medications:  Continuous Infusions:  Scheduled Meds:   cyproheptadine  4 mg Oral TID    enoxparin  40 mg Subcutaneous Daily    fluorouraciL  (ADRUCIL) 600 mg/m2 = 985 mg in 0.9% NaCl 119.7 mL chemo infusion  600 mg/m2 Intravenous Once    mupirocin   Nasal BID    NIFEdipine  60 mg Oral Daily    OLANZapine  5 mg Oral QHS    ondansetron  8 mg Intravenous Q12H    polyethylene glycol  17 g Oral Daily     PRN Meds:  Current Facility-Administered Medications:     acetaminophen, 650 mg, Oral, Q4H PRN    alteplase, 2 mg, Intra-Catheter, PRN    dextrose 10%, 12.5 g, Intravenous, PRN    dextrose 10%, 25 g, Intravenous, PRN    dicyclomine, 10 mg, Oral, TID AC PRN    diphenhydrAMINE, 50 mg, Intravenous, Once PRN    EPINEPHrine, 0.3 mg, Intramuscular, Once PRN    glucagon (human recombinant), 1 mg, Intramuscular, PRN    glucose, 16 g, Oral, PRN    glucose, 24 g, Oral, PRN    heparin, porcine (PF), 500 Units, Intravenous, PRN    hydrocortisone sodium succinate, 100 mg, Intravenous, Once PRN    insulin aspart U-100, 0-10 Units, Subcutaneous, QID (AC + HS) PRN    melatonin, 6 mg, Oral, Nightly PRN    morphine, 2 mg, Intravenous, Q4H PRN    naloxone, 0.02 mg, Intravenous, PRN    oxyCODONE, 5 mg, Oral, Q6H PRN    prochlorperazine, 5 mg, Intravenous, Q6H PRN    senna-docusate 8.6-50 mg, 1 tablet, Oral, BID PRN    sodium chloride 0.9%, 10 mL, Intravenous, Q8H PRN    Flushing PICC/Midline Protocol, , , Until Discontinued **AND** sodium chloride 0.9%, 10 mL, Intravenous, Q12H PRN    sodium chloride 0.9%, 10 mL, Intravenous, PRN     Review of Systems   Constitutional:  Negative for chills and fever.   Respiratory:  Negative for cough and shortness of breath.    Cardiovascular:  Negative for chest pain and leg swelling.   Gastrointestinal:  Positive for abdominal pain and vomiting. Negative for abdominal distention.        Last BM 11/15, but endorses passing flatus   Genitourinary: Negative.    Musculoskeletal: Negative.      Objective:     Vital Signs (Most Recent):  Temp: 98.1 °F (36.7 °C) (11/18/24 1250)  Pulse: 105 (11/18/24 1250)  Resp: 20 (11/18/24 1250)  BP: 119/72  (11/18/24 1250)  SpO2: 98 % (11/18/24 1250) Vital Signs (24h Range):  Temp:  [97.4 °F (36.3 °C)-98.2 °F (36.8 °C)] 98.1 °F (36.7 °C)  Pulse:  [] 105  Resp:  [16-20] 20  SpO2:  [95 %-100 %] 98 %  BP: (107-120)/(57-72) 119/72     Weight: 62.4 kg (137 lb 9.1 oz)  Body mass index is 25.99 kg/m².  Body surface area is 1.64 meters squared.      Intake/Output Summary (Last 24 hours) at 11/18/2024 1343  Last data filed at 11/18/2024 1136  Gross per 24 hour   Intake 1597.42 ml   Output 700 ml   Net 897.42 ml        Physical Exam  Vitals and nursing note reviewed.   Constitutional:       General: She is not in acute distress.     Appearance: Normal appearance.   HENT:      Head: Normocephalic and atraumatic.   Eyes:      General: Scleral icterus present.      Extraocular Movements: Extraocular movements intact.   Cardiovascular:      Rate and Rhythm: Normal rate and regular rhythm.      Heart sounds: Normal heart sounds.   Pulmonary:      Effort: Pulmonary effort is normal. No tachypnea or respiratory distress.   Abdominal:      General: Abdomen is flat. Bowel sounds are decreased. There is no distension.      Tenderness: There is abdominal tenderness. There is no guarding or rebound.   Musculoskeletal:         General: Normal range of motion.      Cervical back: Normal range of motion and neck supple.      Right lower leg: No edema.      Left lower leg: No edema.   Skin:     General: Skin is warm and dry.   Neurological:      Mental Status: She is alert and oriented to person, place, and time.      Cranial Nerves: No cranial nerve deficit.      Motor: No weakness.   Psychiatric:         Attention and Perception: Attention and perception normal.         Mood and Affect: Mood and affect normal.          Significant Labs:   All pertinent labs from the last 24 hours have been reviewed.    Diagnostic Results:  I have reviewed all pertinent imaging results/findings within the past 24 hours.  Assessment/Plan:     * Colon  adenocarcinoma  Pt admitted to service for inpatient chemo initiation due to rapidly increasing bilirubin.     Plan  - Initiate chemo    Serum total bilirubin elevated  F/u daily labs.    Metastasis to liver    CT 11/04: Hepatomegaly with multiple hypoattenuating hepatic masses consistent with metastatic disease   On admission ALP 1,549 with bilirubin 10.0 with scleral icterus. Hepatic metastatic lesions likely etiology causing obstruction. Causing right sided abdominal pain and nausea/emesis. Will initiate FOLFOX 11/16 inpatient in hopes of reducing size of hepatic lesions. No increased leukocytosis, hypotension, low suspicion for cholangitis at this time.fluorouraciLchemo infusion over 23 hours and oxaliplatin  2hours with leucovourin infusion concurrently initiated 11/16 night at 2033. Therapy for 46 hours     - F/U bilirubin with daily CMP  - Complete chemotherapy tonight 11/18  - Zofran 8mg BID  - Olanzapine 5mg qhs x 3 days  - Prochlorperazine 5 mg q6h PRN for nausea  - Oxycodone 5 mg q6h PRN for moderate pain  - Morphine 2mg q4h PRN for severe pain    Abdominal pain  See Metastasis to liver     Essential hypertension  Vitals:    11/17/24 0715 11/17/24 1133 11/17/24 1631 11/17/24 1908   BP: 110/67 115/66 120/69 (!) 116/57    11/17/24 2333 11/18/24 0446 11/18/24 0735 11/18/24 0857   BP: 118/69 111/65 107/68 112/68    11/18/24 0900 11/18/24 1250   BP: 112/68 119/72      - Home nifedipine    Type 2 diabetes mellitus with hyperglycemia, without long-term current use of insulin  Patient's FSGs are controlled on current medication regimen.  Last A1c reviewed-   Lab Results   Component Value Date    HGBA1C 4.8 11/04/2024     POCT glucose low 11/16 AM requiring oral glucose tabs then D5. Became hyperglycemic while on D5 and steroids. Glucose improving. Has had poor PO intake.    Most recent fingerstick glucose reviewed-   Recent Labs   Lab 11/17/24  1704 11/17/24  2058 11/18/24  0837 11/18/24  1246   POCTGLUCOSE 90 84  89 77     Current correctional scale  Medium  Maintain anti-hyperglycemic dose as follows-   Antihyperglycemics (From admission, onward)      Start     Stop Route Frequency Ordered    11/15/24 2042  insulin aspart U-100 pen 0-10 Units         -- SubQ Before meals & nightly PRN 11/15/24 1943          Hold Oral hypoglycemics while patient is in the hospital.             Christi Lu MD  Hematology/Oncology  [facility] - Oncology ([facility])

## 2024-11-18 NOTE — PLAN OF CARE
C1D3 of FOLFOX; no acute events this shift. Afebrile & VSS on room air. No PRNs needed. Electrolytes closely monitored. Continuous chemotherapy infusing at 5.2ml/hr. Ambulates independently to bathroom; educated on importance of I/O recording. CHG wipes provided and encouraged use. Tolerating fair diet, voiding without difficulty. Pt with one episode of emesis following breakfast, denying antiemetics as pt states she is not nauseous. BG monitored ACHS.  and daughter remain at bedside. Pt remaining free from falls or injury throughout shift; bed locked and in lowest position; call light within reach. POC reviewed with patient and family at bedside. All needs addressed. Frequent rounding performed.

## 2024-11-18 NOTE — PLAN OF CARE
POC reviewed with patient; understanding verbalized. C1D3 FOLFOX.. Accu check@ HS. Pt voids independently via hat. Pt. with nonskid footwear on, bed in lowest position, and locked with bed rails up x2.  Pt. instructed to call prior to getting OOB.  to remain at bedside. Pt. has call light and personal items within reach. Patient ambulates w walker. VSS and afebrile this shift. Reinforced importance of accurate I&O's by voiding in hat will continue to monitor

## 2024-11-18 NOTE — ASSESSMENT & PLAN NOTE
Patient's FSGs are controlled on current medication regimen.  Last A1c reviewed-   Lab Results   Component Value Date    HGBA1C 4.8 11/04/2024     POCT glucose low 11/16 AM requiring oral glucose tabs then D5. Became hyperglycemic while on D5 and steroids. Glucose improving. Has had poor PO intake.    Most recent fingerstick glucose reviewed-   Recent Labs   Lab 11/17/24  1704 11/17/24  2058 11/18/24  0837 11/18/24  1246   POCTGLUCOSE 90 84 89 77     Current correctional scale  Medium  Maintain anti-hyperglycemic dose as follows-   Antihyperglycemics (From admission, onward)      Start     Stop Route Frequency Ordered    11/15/24 2042  insulin aspart U-100 pen 0-10 Units         -- SubQ Before meals & nightly PRN 11/15/24 1943          Hold Oral hypoglycemics while patient is in the hospital.

## 2024-11-19 VITALS
WEIGHT: 137.56 LBS | DIASTOLIC BLOOD PRESSURE: 80 MMHG | SYSTOLIC BLOOD PRESSURE: 126 MMHG | BODY MASS INDEX: 25.97 KG/M2 | OXYGEN SATURATION: 96 % | HEIGHT: 61 IN | RESPIRATION RATE: 18 BRPM | TEMPERATURE: 98 F | HEART RATE: 98 BPM

## 2024-11-19 LAB
ALBUMIN SERPL BCP-MCNC: 1.8 G/DL (ref 3.5–5.2)
ALP SERPL-CCNC: 1582 U/L (ref 40–150)
ALT SERPL W/O P-5'-P-CCNC: 98 U/L (ref 10–44)
ANION GAP SERPL CALC-SCNC: 30 MMOL/L (ref 8–16)
AST SERPL-CCNC: 214 U/L (ref 10–40)
BASOPHILS # BLD AUTO: 0.02 K/UL (ref 0–0.2)
BASOPHILS NFR BLD: 0.2 % (ref 0–1.9)
BILIRUB SERPL-MCNC: 11 MG/DL (ref 0.1–1)
BUN SERPL-MCNC: 23 MG/DL (ref 8–23)
CALCIUM SERPL-MCNC: 9.3 MG/DL (ref 8.7–10.5)
CHLORIDE SERPL-SCNC: 95 MMOL/L (ref 95–110)
CO2 SERPL-SCNC: 11 MMOL/L (ref 23–29)
CREAT SERPL-MCNC: 0.9 MG/DL (ref 0.5–1.4)
DIFFERENTIAL METHOD BLD: ABNORMAL
EOSINOPHIL # BLD AUTO: 0 K/UL (ref 0–0.5)
EOSINOPHIL NFR BLD: 0 % (ref 0–8)
ERYTHROCYTE [DISTWIDTH] IN BLOOD BY AUTOMATED COUNT: 17.9 % (ref 11.5–14.5)
EST. GFR  (NO RACE VARIABLE): >60 ML/MIN/1.73 M^2
GLUCOSE SERPL-MCNC: 93 MG/DL (ref 70–110)
HCT VFR BLD AUTO: 26.7 % (ref 37–48.5)
HGB BLD-MCNC: 8.7 G/DL (ref 12–16)
IMM GRANULOCYTES # BLD AUTO: 0.07 K/UL (ref 0–0.04)
IMM GRANULOCYTES NFR BLD AUTO: 0.6 % (ref 0–0.5)
LYMPHOCYTES # BLD AUTO: 1.2 K/UL (ref 1–4.8)
LYMPHOCYTES NFR BLD: 10.6 % (ref 18–48)
MCH RBC QN AUTO: 28.5 PG (ref 27–31)
MCHC RBC AUTO-ENTMCNC: 32.6 G/DL (ref 32–36)
MCV RBC AUTO: 88 FL (ref 82–98)
MONOCYTES # BLD AUTO: 0.4 K/UL (ref 0.3–1)
MONOCYTES NFR BLD: 3.7 % (ref 4–15)
NEUTROPHILS # BLD AUTO: 9.6 K/UL (ref 1.8–7.7)
NEUTROPHILS NFR BLD: 84.9 % (ref 38–73)
NRBC BLD-RTO: 0 /100 WBC
PLATELET # BLD AUTO: 637 K/UL (ref 150–450)
PMV BLD AUTO: 9.1 FL (ref 9.2–12.9)
POCT GLUCOSE: 88 MG/DL (ref 70–110)
POCT GLUCOSE: 96 MG/DL (ref 70–110)
POCT GLUCOSE: 96 MG/DL (ref 70–110)
POTASSIUM SERPL-SCNC: 3.6 MMOL/L (ref 3.5–5.1)
PROT SERPL-MCNC: 6.3 G/DL (ref 6–8.4)
RBC # BLD AUTO: 3.05 M/UL (ref 4–5.4)
SODIUM SERPL-SCNC: 136 MMOL/L (ref 136–145)
WBC # BLD AUTO: 11.32 K/UL (ref 3.9–12.7)

## 2024-11-19 PROCEDURE — 63600175 PHARM REV CODE 636 W HCPCS: Performed by: HOSPITALIST

## 2024-11-19 PROCEDURE — 80053 COMPREHEN METABOLIC PANEL: CPT

## 2024-11-19 PROCEDURE — 25000003 PHARM REV CODE 250: Performed by: INTERNAL MEDICINE

## 2024-11-19 PROCEDURE — 85025 COMPLETE CBC W/AUTO DIFF WBC: CPT

## 2024-11-19 RX ADMIN — CYPROHEPTADINE HYDROCHLORIDE 4 MG: 4 TABLET ORAL at 09:11

## 2024-11-19 RX ADMIN — NIFEDIPINE 60 MG: 60 TABLET, FILM COATED, EXTENDED RELEASE ORAL at 09:11

## 2024-11-19 RX ADMIN — ONDANSETRON 8 MG: 2 INJECTION INTRAMUSCULAR; INTRAVENOUS at 03:11

## 2024-11-19 RX ADMIN — MUPIROCIN: 20 OINTMENT TOPICAL at 09:11

## 2024-11-19 NOTE — ASSESSMENT & PLAN NOTE
CT 11/04: Hepatomegaly with multiple hypoattenuating hepatic masses consistent with metastatic disease   On admission ALP 1,549 with bilirubin 10.0 with scleral icterus. Hepatic metastatic lesions likely etiology causing obstruction. Causing right sided abdominal pain and nausea/emesis. Will initiate FOLFOX 11/16 inpatient in hopes of reducing size of hepatic lesions. No increased leukocytosis, hypotension, low suspicion for cholangitis at this time.fluorouraciLchemo infusion over 23 hours and oxaliplatin  2hours with leucovourin infusion concurrently initiated 11/16 night at 2033. Therapy for 46 hours     - Completed chemotherapy 11/18  - Zofran 8mg BID  - Olanzapine 5mg qhs x 3 days  - Prochlorperazine 5 mg q6h PRN for nausea  - Oxycodone 5 mg q6h PRN for moderate pain  - Morphine 2mg q4h PRN for severe pain

## 2024-11-19 NOTE — ASSESSMENT & PLAN NOTE
Patient's FSGs are controlled on current medication regimen.  Last A1c reviewed-   Lab Results   Component Value Date    HGBA1C 4.8 11/04/2024     POCT glucose low 11/16 AM requiring oral glucose tabs then D5. Became hyperglycemic while on D5 and steroids. Glucose improving. Has had poor PO intake.    Most recent fingerstick glucose reviewed-   Recent Labs   Lab 11/18/24  1759 11/19/24  0039 11/19/24  0900 11/19/24  1116   POCTGLUCOSE 76 96 88 96       Current correctional scale  Medium  Maintain anti-hyperglycemic dose as follows-   Antihyperglycemics (From admission, onward)    Start     Stop Route Frequency Ordered    11/15/24 2042  insulin aspart U-100 pen 0-10 Units         -- SubQ Before meals & nightly PRN 11/15/24 1943        Hold Oral hypoglycemics while patient is in the hospital.

## 2024-11-19 NOTE — ASSESSMENT & PLAN NOTE
F/u daily labs.    Recent Labs   Lab 11/19/24  0509   ALT 98*   *   ALKPHOS 1,582*   BILITOT 11.0*   PROT 6.3   ALBUMIN 1.8*

## 2024-11-19 NOTE — ASSESSMENT & PLAN NOTE
Vitals:    11/18/24 0735 11/18/24 0857 11/18/24 0900 11/18/24 1250   BP: 107/68 112/68 112/68 119/72    11/18/24 1621 11/18/24 1944 11/19/24 0015 11/19/24 0501   BP: 126/77 120/65 118/70 112/69    11/19/24 0734 11/19/24 1114   BP: 123/73 126/80      - Home nifedipine

## 2024-11-19 NOTE — DISCHARGE SUMMARY
[facility] - Oncology ([facility])  Hematology/Oncology  Discharge Summary      Patient Name: Tosin Ma  MRN: [id number]  Admission Date: 11/15/2024  Hospital Length of Stay: 4 days  Discharge Date and Time:  11/19/2024 2:19 PM  Attending Physician: Khris Ocampo IV, MD   Discharging Provider: Christi Lu MD  Primary Care Provider: Lake Cunningham MD    HPI: Ms Ma is a 68 yo woman with HTN, HLD, history of breast cancer, T2DM. She presented with abdominal pain and bloating worsened with eating or drinking, along with frequent bowel movements after eating. RUQ pain radiating to the back. She was noted to have elevated LFT and was sent to ED. Bilirubin was noted to be 6.7, alk phos 830, , ALT 80. CT abdomen/pelvis on 11/4/24 showed Mid descending colon 4 cm soft tissue mass concerning for primary malignancy. Hepatomegaly with multiple hypoattenuating hepatic masses consistent with metastatic disease given aforementioned findings. Multiple small pulmonary nodules also concerning for metastatic disease. Postsurgical changes and additional findings as detailed above. liver biopsy on 11/5/2024 showed adenocarcinoma with mucinous features. Positive for CDX2, CK20, CK7, negative for TTF1, ER, PAX8, GATA3. Primary site include GI tract, pancreatobiliary tract and among others. MMR-proficient. Tempus tissue pending. Patient presents today for further management. Feeling poorly. +anorexia. Throws up after she eats. Can only tolerate ice cream. +weight loss. +abdominal pain. +jaundice. +weakness.     Pt is being admitted to our service for inpatient chemo in setting of colon cancer with liver mets causing rapidly increasing bilirubin.     * No surgery found *     Hospital Course: Initiated on C1D1 of FOLFOX . Poor PO intake occasionally hypoglycemic requiring D5 mildly hyperglycemic following initiation of steroids, D5 d/c. Hyperglycemia improved following cessation of D5. Mild hypokalemia requiring  "supplementation. Nausea and vomiting overnight 11/17, relieved with anti-emetics. Completing therapy evening of 11/18, with plans to discharge morning of 11/19. Will follow up with interventional radiology outpatient for port placement for future chemotherapy.    Goals of Care Treatment Preferences:  Code Status: Full Code      Consults:   Consults (From admission, onward)          Status Ordering Provider     Inpatient consult to PICC team (JORGE)  Once        Provider:  (Not yet assigned)    Completed ALEJANDRA PRO            Significant Diagnostic Studies: Labs: CMP   Recent Labs   Lab 11/18/24 0348 11/19/24  0509   * 136   K 3.5 3.6   CL 93* 95   CO2 11* 11*    93   BUN 21 23   CREATININE 0.9 0.9   CALCIUM 9.1 9.3   PROT  --  6.3   ALBUMIN  --  1.8*   BILITOT  --  11.0*   ALKPHOS  --  1,582*   AST  --  214*   ALT  --  98*   ANIONGAP 29* 30*    and CBC   Recent Labs   Lab 11/18/24 0348 11/19/24  0456   WBC 11.92 11.32   HGB 8.7* 8.7*   HCT 26.5* 26.7*   * 637*       Pending Diagnostic Studies:       None          Final Active Diagnoses:    Diagnosis Date Noted POA    PRINCIPAL PROBLEM:  Colon adenocarcinoma [C18.9] 11/14/2024 Yes    Metastasis to liver [C78.7] 11/05/2024 Yes    Serum total bilirubin elevated [R17] 11/05/2024 Yes    Type 2 diabetes mellitus with hyperglycemia, without long-term current use of insulin [E11.65] 11/04/2024 Yes    Abdominal pain [R10.9] 11/04/2024 Yes    Essential hypertension [I10] 09/03/2020 Yes      Problems Resolved During this Admission:      Discharged Condition: stable    Disposition: Home or Self Care    Follow Up:    Patient Instructions:      BATH/SHOWER CHAIR FOR HOME USE     Order Specific Question Answer Comments   Height: 5' 1" (1.549 m)    Weight: 62.4 kg (137 lb 9.1 oz)    Length of need (1-99 months): 6    Type: With back      Ambulatory referral/consult to Interventional RAD   Standing Status: Future   Referral Priority: Urgent Referral Type: " Consultation   Number of Visits Requested: 1     Medications:  Reconciled Home Medications:      Medication List        CONTINUE taking these medications      cyproheptadine 4 mg tablet  Commonly known as: PERIACTIN  Take 1 tablet (4 mg total) by mouth 3 (three) times daily. To increase appetite     dicyclomine 10 MG capsule  Commonly known as: BENTYL  Take 1 capsule (10 mg total) by mouth before meals as needed (For abdominal pain/bloating).     hydroCHLOROthiazide 25 MG tablet  Commonly known as: HYDRODIURIL  Take 25 mg by mouth once daily.     LIDOcaine-prilocaine cream  Commonly known as: EMLA  Apply topically once. Apply over port site at least 60 minutes prior to chemo for 1 dose     NIFEdipine 60 MG (OSM) 24 hr tablet  Commonly known as: PROCARDIA-XL  Take 60 mg by mouth once daily.     OLANZapine 5 MG tablet  Commonly known as: ZyPREXA  Take 1 tablet (5 mg total) by mouth every evening. Take nightly on days 1-3 of each chemotherapy cycle.     * ondansetron 4 MG Tbdl  Commonly known as: ZOFRAN-ODT  Take 1 tablet (4 mg total) by mouth every 6 (six) hours as needed (nausea / vomiting).     * ondansetron 4 MG Tbdl  Commonly known as: ZOFRAN-ODT  Take 1 tablet (4 mg total) by mouth every 6 (six) hours as needed (nausea with vomiting).     oxyCODONE 5 MG immediate release tablet  Commonly known as: ROXICODONE  Take 1 tablet (5 mg total) by mouth every 6 (six) hours as needed for Pain.     polyethylene glycol 17 gram Pwpk  Commonly known as: GLYCOLAX  Take 17 g by mouth once daily.     prochlorperazine 5 MG tablet  Commonly known as: COMPAZINE  Take 1 tablet (5 mg total) by mouth every 6 (six) hours as needed for Nausea.           * This list has 2 medication(s) that are the same as other medications prescribed for you. Read the directions carefully, and ask your doctor or other care provider to review them with you.                STOP taking these medications      HYDROcodone-acetaminophen 5-325 mg per  tablet  Commonly known as: JOSIE Lu MD  Hematology/Oncology  [facility] - Oncology ([facility])

## 2024-11-19 NOTE — NURSING
Pt discharged to home per MD order. Discharge instructions given and explained to pt. PICC line removed. Pt with one episode of emesis following breakfast, denying antiemetics as pt states she is not nauseous. ACHS. BG monitored. Pt ambulating in room with steady gait; tolerating fair diet; voiding without difficulty. All VSS; Afebrile, AAOx4, O2 sats WNL. Pt left floor by wheelchair via hospital transportation; accompanied by spouse from the floor.

## 2024-11-19 NOTE — ASSESSMENT & PLAN NOTE
Pt admitted to service for inpatient chemo initiation due to rapidly increasing bilirubin.     Plan  - Port placement with IR outpatient  - Continue with chemo outpatient

## 2024-11-19 NOTE — NURSING
PCT notified RN at 1708 of pt's dinner time BG of 61. Pt asymptomatic resting in bed. PRN glucose tablets given at 1715. BG increased to 76 post glucose tablets. Resident notified. Pt up eating dinner.    Pt also had 1 episode of emesis following dinner. Pt continues to state she is not nauseous and refusing antiemetics at this time. Pt and  stating they notice she is having episodes of emesis after drinking water. Resident notified and verbal to discuss with team tomorrow.

## 2024-11-23 ENCOUNTER — NURSE TRIAGE (OUTPATIENT)
Dept: ADMINISTRATIVE | Facility: CLINIC | Age: 69
End: 2024-11-23
Payer: COMMERCIAL

## 2024-11-23 PROBLEM — N17.9 AKI (ACUTE KIDNEY INJURY): Status: ACTIVE | Noted: 2024-11-23

## 2024-11-23 PROBLEM — R19.7 DIARRHEA: Status: ACTIVE | Noted: 2024-01-01

## 2024-11-23 NOTE — ASSESSMENT & PLAN NOTE
Vomiting, likely due to chemotherapy  - Zofran 8mg BID  - Prochlorperazine 5 mg q6h PRN for nausea

## 2024-11-23 NOTE — ED TRIAGE NOTES
Tosin Ma, a 69 y.o. female presents to the ED w/ complaint of emesis     Triage note:  Chief Complaint   Patient presents with    Emesis     Hx of colon cancer on chemo     Review of patient's allergies indicates:  No Known Allergies  Past Medical History:   Diagnosis Date    Breast cancer 1996    Right    Diabetes mellitus     Hypertension     Liver lesion 11/5/2024

## 2024-11-23 NOTE — HPI
Ms. Ma is a pleasant 68 yo woman with PMHx of HTN, HLD, T2DM, breast cancer, and colon adenocarcinoma with mets to the liver on OP GI mFOLFOX6 (oxaliplatin leucovorin fluorouracil) Q2W, Day 1 Cycle 2 on hold, patient is known to Dr. Parham. She was recently discharged on 11/19/2024. [place] [facility] [place] today with the c/o vomiting and diarrhea. She said she has not being able to keep food or fluid down since she was discharged, and also having loose stools. She denies fever, chest pain, dysuria, headache, or abdominal pain. However endorses nausea and weakness.    In ED today, her BP was on the soft side (115/58), normal pulse, saturating well on RA. Labs were remarkable for Hb of 8.7, platelet of 614, CR OF 1.3 (Her baseline line was 0.9), Glucose of 65, ALP of 1663, Hi- 17.1, AST 0f 173, ALT of 105. CT abdo with contrast performewd today at the ED shows markedly enlarged heterogeneous appearance of the liver consistent with biopsy-proven metastasis, mass effect of the left hepatic lobe on the gastric fundus which appears more prominent compared to prior study, and fluid in the distal esophagus suggesting either reflux or poor esophageal motility.    We will admit to Hem Onc for further management.

## 2024-11-23 NOTE — ASSESSMENT & PLAN NOTE
Creatinine 1.3 on admit, baseline around 0.9, likely d/o to dehydration from fluid loss (Vomit/diarrhea)., Creatinine jumped from 0.9 to 1.3    Plan:   Lab Results   Component Value Date    CREATININE 1.3 11/23/2024   - IVF   - Strict input and output  - Avoid nephrotoxic agents such as NSAIDs, gadolinium and IV radiocontrast.  - Renally dose meds to current GFR.  - Maintain MAP > 65.

## 2024-11-23 NOTE — CONSULTS
Consult received, chart reviewed, and patient evaluated at bedside.  Patient to be admitted to Medical Oncology.  Full H&P to follow.    Adan Mitchell M.D.  Hematology & Medical Oncology Fellow  [facility]

## 2024-11-23 NOTE — SUBJECTIVE & OBJECTIVE
Oncology Treatment Plan:   OP GI mFOLFOX6 (oxaliplatin leucovorin fluorouracil) Q2W    Medications:  Continuous Infusions:  Scheduled Meds:   cyproheptadine  4 mg Oral TID    heparin (porcine)  5,000 Units Subcutaneous Q8H    hydroCHLOROthiazide  25 mg Oral Daily    NIFEdipine  60 mg Oral Daily    OLANZapine  5 mg Oral QHS     PRN Meds:  Current Facility-Administered Medications:     dextrose 10%, 12.5 g, Intravenous, PRN    dextrose 10%, 25 g, Intravenous, PRN    dicyclomine, 10 mg, Oral, TID AC PRN    glucagon (human recombinant), 1 mg, Intramuscular, PRN    glucose, 16 g, Oral, PRN    glucose, 24 g, Oral, PRN    naloxone, 0.02 mg, Intravenous, PRN    ondansetron, 4 mg, Intravenous, Q8H PRN    oxyCODONE, 5 mg, Oral, Q6H PRN    sodium chloride 0.9%, 10 mL, Intravenous, Q12H PRN     Review of patient's allergies indicates:  No Known Allergies     Past Medical History:   Diagnosis Date    Breast cancer     Right    Diabetes mellitus     Hypertension     Liver lesion 2024     Past Surgical History:   Procedure Laterality Date    BIOPSY OF LIVER WITH ULTRASOUND GUIDANCE N/A 2024    Procedure: BIOPSY, LIVER, WITH US GUIDANCE;  Surgeon: Gisell Spence MD;  Location: [facility] CATH LAB;  Service: Interventional Radiology;  Laterality: N/A;    BREAST BIOPSY Left 10/06/2017    BREAST BIOPSY Right 1996    BREAST LUMPECTOMY Right 1996    + CA     SECTION      , ,     COLONOSCOPY N/A 2019    Procedure: COLONOSCOPY;  Surgeon: Fredrick Bailey MD;  Location: [address]);  Service: Endoscopy;  Laterality: N/A;    HYSTERECTOMY       Family History       Problem Relation (Age of Onset)    Breast cancer Sister (48), Cousin, Cousin (48), Daughter    Pancreatic cancer Mother          Tobacco Use    Smoking status: Never    Smokeless tobacco: Never   Substance and Sexual Activity    Alcohol use: No    Drug use: No    Sexual activity: Yes     Partners: Male     Birth control/protection: See  Surgical Hx       Review of Systems   Constitutional:  Positive for fatigue. Negative for fever.   HENT:  Negative for congestion.    Cardiovascular:  Negative for leg swelling.   Gastrointestinal:  Positive for diarrhea and vomiting.   Psychiatric/Behavioral:  Negative for confusion.      Objective:     Vital Signs (Most Recent):  Temp: 97.9 °F (36.6 °C) (11/23/24 1010)  Pulse: 94 (11/23/24 1302)  Resp: (!) 22 (11/23/24 1302)  BP: (!) 115/58 (11/23/24 1302)  SpO2: 99 % (11/23/24 1302) Vital Signs (24h Range):  Temp:  [97.9 °F (36.6 °C)] 97.9 °F (36.6 °C)  Pulse:  [] 94  Resp:  [16-22] 22  SpO2:  [99 %] 99 %  BP: (115-125)/(58-60) 115/58     Weight: 62.1 kg (137 lb)  Body mass index is 25.89 kg/m².  Body surface area is 1.63 meters squared.      Intake/Output Summary (Last 24 hours) at 11/23/2024 1716  Last data filed at 11/23/2024 1305  Gross per 24 hour   Intake 1082.25 ml   Output --   Net 1082.25 ml        Physical Exam  Vitals and nursing note reviewed.   Constitutional:       Appearance: She is ill-appearing.   Eyes:      General: Scleral icterus present.   Cardiovascular:      Rate and Rhythm: Normal rate and regular rhythm.      Pulses: Normal pulses.   Pulmonary:      Effort: Pulmonary effort is normal. No respiratory distress.      Breath sounds: Normal breath sounds.   Abdominal:      General: Abdomen is flat. There is no distension.      Palpations: Abdomen is soft.      Tenderness: There is no abdominal tenderness.   Musculoskeletal:      Right lower leg: No edema.      Left lower leg: No edema.   Neurological:      Mental Status: She is alert and oriented to person, place, and time.      Motor: No weakness.   Psychiatric:         Mood and Affect: Mood normal.         Behavior: Behavior normal.          Significant Labs:   All pertinent labs from the last 24 hours have been reviewed.    Diagnostic Results:  I have reviewed all pertinent imaging results/findings within the past 24 hours.  I have  reviewed and interpreted all pertinent imaging results/findings within the past 24 hours.

## 2024-11-23 NOTE — ED NOTES
"Pt presents to ED via personal transport w/ family member w/ multiple complaints. Hx colon cancer + received chemo on 11/18/24. Pt reports nausea, vomiting, generalized weakness, and decreased PO intake since received chemo on Monday. States, "I can't keep anything down." Also endorsing intermittent shortness of breath w/ exertion and generalized abdominal pain. Referred by oncology team for further evaluation. Denies headache, fevers, chills, chest pain, dysuria.    Patient identifiers for Tosin Ma 69 y.o. female checked and correct.  Chief Complaint   Patient presents with    Emesis     Hx of colon cancer on chemo     Past Medical History:   Diagnosis Date    Breast cancer 1996    Right    Diabetes mellitus     Hypertension     Liver lesion 11/5/2024     Allergies reported: Review of patient's allergies indicates:  No Known Allergies    "

## 2024-11-23 NOTE — CLINICAL REVIEW
Sepsis Screen (most recent)       Sepsis Screen () - 11/23/24 1526       Is the patient's history or complaint suggestive of a possible infection? Yes  -    Are there at least two of the following signs and symptoms present? Yes  -    Sepsis signs/symptoms - Tachycardia Tachycardia     >90  -    Sepsis signs/symptoms - Tachypnea Tachypnea     >20  -    Are any of the following organ dysfunction criteria present and not considered to be due to a chronic condition? Yes   recent chemo -    Organ Dysfunction Criteria Total Bilirubin > 2.0 Platelet count < 100,000  -    Organ Dysfunction Criteria Lactate > 2.0  -    Initiate Sepsis Protocol No  -    Reason sepsis not considered Pt. receiving appropriate management   w/up in process, UCx ordered, not currently on abx -              User Key  (r) = Recorded By, (t) = Taken By, (c) = Cosigned By      Initials Name    Caitlyn Wakefield RN

## 2024-11-23 NOTE — ED NOTES
Patient identifiers for Tosin Ma 69 y.o. female checked and correct.  Chief Complaint   Patient presents with    Emesis     Hx of colon cancer on chemo     Past Medical History:   Diagnosis Date    Breast cancer 1996    Right    Diabetes mellitus     Hypertension     Liver lesion 11/5/2024     Allergies reported: Review of patient's allergies indicates:  No Known Allergies    Appearance: Pt awake, alert & oriented to person, place & time. Pt in no acute distress at present time. Pt is clean and well groomed with clothes appropriately fastened.   Skin: Skin warm, dry & intact. Color consistent with ethnicity. Mucous membranes moist. No breakdown or brusing noted. jaundice  Musculoskeletal: Patient moving all extremities well, no obvious swelling or deformities noted.   Respiratory: Respirations spontaneous, even, and non-labored. Visible chest rise noted. Airway is open and patent. No accessory muscle use noted.   Neurologic: Sensation is intact. Speech is clear and appropriate. Eyes open spontaneously, behavior appropriate to situation, follows commands, facial expression symmetrical, bilateral hand grasp equal and even, purposeful motor response noted.  Cardiac: All peripheral pulses present. No Bilateral lower extremity edema. Cap refill is <3 seconds.  Abdomen: Abdomen soft, non distended, non tender to palpation.   : Pt voids independently, denies dysuria, hematuria, frequency.

## 2024-11-23 NOTE — H&P
[facility] - Emergency Dept  Hematology/Oncology  H&P    Patient Name: Tosin Ma  MRN: [id number]  Admission Date: 11/23/2024  Code Status: Full Code   Attending Provider: Justin Han MD  Primary Care Physician: Lake Cunningham MD  Principal Problem:<principal problem not specified>    Subjective:     HPI: Ms. Ma is a pleasant 70 yo woman with PMHx of HTN, HLD, T2DM, breast cancer, and colon adenocarcinoma with mets to the liver on OP GI mFOLFOX6 (oxaliplatin leucovorin fluorouracil) Q2W, Day 1 Cycle 2 on hold, patient is known to Dr. Parham. She was recently discharged on 11/19/2024. Presented to [facility] ED today with the c/o vomiting and diarrhea. She said she has not being able to keep food or fluid down since she was discharged, and also having loose stools. She denies fever, chest pain, dysuria, headache, or abdominal pain. However endorses nausea and weakness.    In ED today, her BP was on the soft side (115/58), normal pulse, saturating well on RA. Labs were remarkable for Hb of 8.7, platelet of 614, CR OF 1.3 (Her baseline line was 0.9), Glucose of 65, ALP of 1663, Hi- 17.1, AST 0f 173, ALT of 105. CT abdo with contrast performewd today at the ED shows markedly enlarged heterogeneous appearance of the liver consistent with biopsy-proven metastasis, mass effect of the left hepatic lobe on the gastric fundus which appears more prominent compared to prior study, and fluid in the distal esophagus suggesting either reflux or poor esophageal motility.    We will admit to Hem Onc for further management.     Oncology Treatment Plan:   OP GI mFOLFOX6 (oxaliplatin leucovorin fluorouracil) Q2W    Medications:  Continuous Infusions:  Scheduled Meds:   cyproheptadine  4 mg Oral TID    heparin (porcine)  5,000 Units Subcutaneous Q8H    hydroCHLOROthiazide  25 mg Oral Daily    NIFEdipine  60 mg Oral Daily    OLANZapine  5 mg Oral QHS     PRN Meds:  Current Facility-Administered Medications:      dextrose 10%, 12.5 g, Intravenous, PRN    dextrose 10%, 25 g, Intravenous, PRN    dicyclomine, 10 mg, Oral, TID AC PRN    glucagon (human recombinant), 1 mg, Intramuscular, PRN    glucose, 16 g, Oral, PRN    glucose, 24 g, Oral, PRN    naloxone, 0.02 mg, Intravenous, PRN    ondansetron, 4 mg, Intravenous, Q8H PRN    oxyCODONE, 5 mg, Oral, Q6H PRN    sodium chloride 0.9%, 10 mL, Intravenous, Q12H PRN     Review of patient's allergies indicates:  No Known Allergies     Past Medical History:   Diagnosis Date    Breast cancer     Right    Diabetes mellitus     Hypertension     Liver lesion 2024     Past Surgical History:   Procedure Laterality Date    BIOPSY OF LIVER WITH ULTRASOUND GUIDANCE N/A 2024    Procedure: BIOPSY, LIVER, WITH US GUIDANCE;  Surgeon: Gisell Spence MD;  Location: [facility] CATH LAB;  Service: Interventional Radiology;  Laterality: N/A;    BREAST BIOPSY Left 10/06/2017    BREAST BIOPSY Right     BREAST LUMPECTOMY Right     + CA     SECTION      , ,     COLONOSCOPY N/A 2019    Procedure: COLONOSCOPY;  Surgeon: Fredrick Bailey MD;  Location: [facility] ENDO ([address] [place] [address]);  Service: Endoscopy;  Laterality: N/A;    HYSTERECTOMY       Family History       Problem Relation (Age of Onset)    Breast cancer Sister (48), Cousin, Cousin (48), Daughter    Pancreatic cancer Mother          Tobacco Use    Smoking status: Never    Smokeless tobacco: Never   Substance and Sexual Activity    Alcohol use: No    Drug use: No    Sexual activity: Yes     Partners: Male     Birth control/protection: See Surgical Hx       Review of Systems   Constitutional:  Positive for fatigue. Negative for fever.   HENT:  Negative for congestion.    Cardiovascular:  Negative for leg swelling.   Gastrointestinal:  Positive for diarrhea and vomiting.   Psychiatric/Behavioral:  Negative for confusion.      Objective:     Vital Signs (Most Recent):  Temp: 97.9 °F (36.6 °C) (24 1010)  Pulse: 94  (11/23/24 1302)  Resp: (!) 22 (11/23/24 1302)  BP: (!) 115/58 (11/23/24 1302)  SpO2: 99 % (11/23/24 1302) Vital Signs (24h Range):  Temp:  [97.9 °F (36.6 °C)] 97.9 °F (36.6 °C)  Pulse:  [] 94  Resp:  [16-22] 22  SpO2:  [99 %] 99 %  BP: (115-125)/(58-60) 115/58     Weight: 62.1 kg (137 lb)  Body mass index is 25.89 kg/m².  Body surface area is 1.63 meters squared.      Intake/Output Summary (Last 24 hours) at 11/23/2024 1716  Last data filed at 11/23/2024 1305  Gross per 24 hour   Intake 1082.25 ml   Output --   Net 1082.25 ml        Physical Exam  Vitals and nursing note reviewed.   Constitutional:       Appearance: She is ill-appearing.   Eyes:      General: Scleral icterus present.   Cardiovascular:      Rate and Rhythm: Normal rate and regular rhythm.      Pulses: Normal pulses.   Pulmonary:      Effort: Pulmonary effort is normal. No respiratory distress.      Breath sounds: Normal breath sounds.   Abdominal:      General: Abdomen is flat. There is no distension.      Palpations: Abdomen is soft.      Tenderness: There is no abdominal tenderness.   Musculoskeletal:      Right lower leg: No edema.      Left lower leg: No edema.   Neurological:      Mental Status: She is alert and oriented to person, place, and time.      Motor: No weakness.   Psychiatric:         Mood and Affect: Mood normal.         Behavior: Behavior normal.          Significant Labs:   All pertinent labs from the last 24 hours have been reviewed.    Diagnostic Results:  I have reviewed all pertinent imaging results/findings within the past 24 hours.  I have reviewed and interpreted all pertinent imaging results/findings within the past 24 hours.  Assessment/Plan:     Diarrhea  Vomiting, likely due to chemotherapy  - Zofran 8mg BID  - Prochlorperazine 5 mg q6h PRN for nausea    ANDRES (acute kidney injury)  Creatinine 1.3 on admit, baseline around 0.9, likely d/o to dehydration from fluid loss (Vomit/diarrhea)., Creatinine jumped from 0.9 to  1.3    Plan:   Lab Results   Component Value Date    CREATININE 1.3 11/23/2024   - IVF   - Strict input and output  - Avoid nephrotoxic agents such as NSAIDs, gadolinium and IV radiocontrast.  - Renally dose meds to current GFR.  - Maintain MAP > 65.      Colon adenocarcinoma  Chemotherapy is currently on hold due to rapidly increasing bilirubin.      Plan  -- Monitor LFTs    Serum total bilirubin elevated  Bilirubin 17.1    Plan:  --Daily LFTs  -- Monitor    Metastasis to liver    CT 11/04: Hepatomegaly with multiple hypoattenuating hepatic masses consistent with metastatic disease   On admission ALP 1,549 with bilirubin 10.0 with scleral icterus. Hepatic metastatic lesions likely etiology causing obstruction. Causing right sided abdominal pain and nausea/emesis. Will initiate FOLFOX 11/16 inpatient in hopes of reducing size of hepatic lesions. No increased leukocytosis, hypotension, low suspicion for cholangitis at this time.fluorouraciLchemo infusion over 23 hours and oxaliplatin  2hours with leucovourin infusion concurrently initiated 11/16 night at 2033. Therapy for 46 hours      - Completed chemotherapy 11/18  - Zofran 8mg BID  - Olanzapine 5mg qhs x 3 days  - Prochlorperazine 5 mg q6h PRN for nausea  - Oxycodone 5 mg q6h PRN for moderate pain  - Morphine 2mg q4h PRN for severe pain    Type 2 diabetes mellitus with hyperglycemia, without long-term current use of insulin  Known T2DM  Plan:  -- Hold Oral hyperglycemic meds        Anjali Valenzuela MD  Hematology/Oncology  [facility] - Emergency Dept

## 2024-11-24 PROBLEM — R11.2 NAUSEA & VOMITING: Status: ACTIVE | Noted: 2024-01-01

## 2024-11-24 PROBLEM — E87.20 METABOLIC ACIDOSIS: Status: ACTIVE | Noted: 2024-01-01

## 2024-11-24 PROBLEM — K59.00 CONSTIPATION: Status: ACTIVE | Noted: 2024-01-01

## 2024-11-24 NOTE — NURSING
Pt direct admit from ED. AAOx4. All VSS, Afebrile. Pt involved in plan of care and communicating needs throughout shift. Pt is not tolerating diet due to not being able to keep down fluids and food. Voiding without difficulty and via purwick. NS running continuously at 100mL/hr. No acute events so far this shift. Pt remaining free from falls or injury throughout shift; bed locked and in lowest position; call light within reach. Bed alarm on. Pt instructed to call for assistance as needed.  Q1H rounding done on pt.

## 2024-11-24 NOTE — ASSESSMENT & PLAN NOTE
68 yo woman with newly diagnosed metastatic adenocarcinoma to the liver. CT A/P showed a mass in the descending colon. Large disease burden in the liver. High bilirubin due to disease burden.     CT 11/04: Hepatomegaly with multiple hypoattenuating hepatic masses consistent with metastatic disease   On admission ALP 1,549 with bilirubin 10.0 with scleral icterus. Hepatic metastatic lesions likely etiology causing obstruction. Causing right sided abdominal pain and nausea/emesis. Will initiate FOLFOX 11/16 inpatient in hopes of reducing size of hepatic lesions. No increased leukocytosis, hypotension, low suspicion for cholangitis at this time.fluorouraciLchemo infusion over 23 hours and oxaliplatin  2hours with leucovourin infusion concurrently initiated 11/16 night at 2033. Therapy for 46 hours     - Zofran 8 mg q8h PRN for nausea  - Prochlorperazine 5 mg q6h PRN for nausea  - Oxycodone 5 mg q6h PRN for moderate to severe pain  - Morphine 2mg q4h PRN for breakthrough

## 2024-11-24 NOTE — ASSESSMENT & PLAN NOTE
Ms Ma is a 70 yo woman with newly diagnosed metastatic adenocarcinoma to the liver. CT A/P showed a mass in the descending colon. Large disease burden in the liver. High bilirubin due to disease burden. Discussed with patient and family the prognosis of a stage IV colon cancer with extensive liver mets. Palliative FOLFOX last admission due to rising LFTs and Tbili.     Plan  - Monitor LFTs  - CEA pending

## 2024-11-24 NOTE — ASSESSMENT & PLAN NOTE
Unable to tolerate PO. Immediately vomiting anything she eats/drinks. Likely 2/2 to hepatomegaly compressing stomach.  - D5W  - Advance diet as tolerated  - Optimize bowel regimen

## 2024-11-24 NOTE — PLAN OF CARE
BG @0754 was 58 MD notified, glucose tabs and juice given rechecked BG increased after every check.Started on continuous D5W @100ml/hr.Tolerated morning meds slowly one by one. Prn antiemetic given for nausea, denies emesis this shift .Up to BS with assist x1.Remain free from falls/injuries.Nonskid footwear on with bed locked and low,bed rails up x2. Family remain at bedside.Call light and personal items within reach.

## 2024-11-24 NOTE — ASSESSMENT & PLAN NOTE
Bilirubin 17.1 upon admission. Was elevated between 9-11 last admission.    Plan:  - Daily LFTs  - Continue to monitor

## 2024-11-24 NOTE — PLAN OF CARE
Pt A&Ox4, VSS, afebrile. Pt was given one dose of oxycodone 5mg for pain, it was effective. PT now has a diabetic diet in place. Pt has been turned as tolerated and has used the bedside commode as a one person assist. Pt was able to take pills slowly one at a time. PT currently resting, pt care ongoing.

## 2024-11-24 NOTE — PROGRESS NOTES
[facility]avez - Oncology ([facility])  Hematology/Oncology  Progress Note    Patient Name: Tosin Ma  Admission Date: 11/23/2024  Hospital Length of Stay: 1 days  Code Status: Full Code     Subjective:     HPI:  Ms. Ma is a pleasant 70 yo woman with PMHx of HTN, HLD, T2DM, breast cancer, and colon adenocarcinoma with mets to the liver on OP GI mFOLFOX6 (oxaliplatin leucovorin fluorouracil) Q2W, Day 1 Cycle 2 on hold, patient is known to Dr. Parham. She was recently discharged on 11/19/2024. Presented to [facility] ED today with the c/o vomiting and diarrhea. She said she has not being able to keep food or fluid down since she was discharged, and also having loose stools. She denies fever, chest pain, dysuria, headache, or abdominal pain. However endorses nausea and weakness.    In ED today, her BP was on the soft side (115/58), normal pulse, saturating well on RA. Labs were remarkable for Hb of 8.7, platelet of 614, CR OF 1.3 (Her baseline line was 0.9), Glucose of 65, ALP of 1663, Hi- 17.1, AST 0f 173, ALT of 105. CT abdo with contrast performewd today at the ED shows markedly enlarged heterogeneous appearance of the liver consistent with biopsy-proven metastasis, mass effect of the left hepatic lobe on the gastric fundus which appears more prominent compared to prior study, and fluid in the distal esophagus suggesting either reflux or poor esophageal motility.    We will admit to Hem Onc for further management.    No new subjective & objective note has been filed under this hospital service since the last note was generated.    Assessment/Plan:     * Nausea & vomiting  Unable to tolerate PO. Immediately vomiting anything she eats/drinks. Likely 2/2 to hepatomegaly compressing stomach.  - D5W  - Advance diet as tolerated  - Optimize bowel regimen    Metabolic acidosis  - POC lactate 13.75  - VBG pH 7.19, CO2 32.7, HCO3 12.5  - WBC 13.07 from 11.35  - Afebrile since admission, denies fever/chills  - Blood cx  pending  - Urine cx pending  - Holding on abx until cx return      Constipation  Last Bowel Movement: 11/19/24  - Optimize bowel regimen    ANDRES (acute kidney injury)  ANDRES is likely due to pre-renal azotemia due to dehydration. Baseline creatinine is  0.9-1.0 . Most recent creatinine and eGFR are listed below.  Recent Labs     11/23/24  1210 11/24/24  0509   CREATININE 1.3 1.6*   EGFRNORACEVR 45.1* 35.1*      Plan  - ANDRES is worsening. Will continue current treatment  - Avoid nephrotoxins and renally dose meds for GFR listed above  - Monitor urine output, serial BMP, and adjust therapy as needed  - 2L in ED. Continuous @100cc/hr x5 hours yesterday after admission.   - D5W 100cc/hr x10hr  - Urine studies ordered    Colon adenocarcinoma  Ms Ma is a 70 yo woman with newly diagnosed metastatic adenocarcinoma to the liver. CT A/P showed a mass in the descending colon. Large disease burden in the liver. High bilirubin due to disease burden. Discussed with patient and family the prognosis of a stage IV colon cancer with extensive liver mets. Palliative FOLFOX last admission due to rising LFTs and Tbili.     Plan  - Monitor LFTs  - CEA pending    Serum total bilirubin elevated  Bilirubin 17.1 upon admission. Was elevated between 9-11 last admission.    Plan:  - Daily LFTs  - Continue to monitor    Metastasis to liver  70 yo woman with newly diagnosed metastatic adenocarcinoma to the liver. CT A/P showed a mass in the descending colon. Large disease burden in the liver. High bilirubin due to disease burden.     CT 11/04: Hepatomegaly with multiple hypoattenuating hepatic masses consistent with metastatic disease   On admission ALP 1,549 with bilirubin 10.0 with scleral icterus. Hepatic metastatic lesions likely etiology causing obstruction. Causing right sided abdominal pain and nausea/emesis. Will initiate FOLFOX 11/16 inpatient in hopes of reducing size of hepatic lesions. No increased leukocytosis, hypotension, low  suspicion for cholangitis at this time.fluorouraciLchemo infusion over 23 hours and oxaliplatin  2hours with leucovourin infusion concurrently initiated 11/16 night at 2033. Therapy for 46 hours     - Zofran 8 mg q8h PRN for nausea  - Prochlorperazine 5 mg q6h PRN for nausea  - Oxycodone 5 mg q6h PRN for moderate to severe pain  - Morphine 2mg q4h PRN for breakthrough    Type 2 diabetes mellitus with hyperglycemia, without long-term current use of insulin  Patient's FSGs are uncontrolled due to hypoglycemia on current medication regimen.  Last A1c reviewed-   Lab Results   Component Value Date    HGBA1C 4.8 11/04/2024     Most recent fingerstick glucose reviewed-   Recent Labs   Lab 11/24/24  0754 11/24/24  0912 11/24/24  1019 11/24/24  1046   POCTGLUCOSE 58* 63* 70 78     Current correctional scale  Low  Maintain anti-hyperglycemic dose as follows-   Antihyperglycemics (From admission, onward)      Start     Stop Route Frequency Ordered    11/24/24 0816  insulin aspart U-100 pen 0-5 Units         -- SubQ Before meals & nightly PRN 11/24/24 0716          Hold Oral hypoglycemics while patient is in the hospital.  D5W due to poor PO intake.             Christi Lu MD  Hematology/Oncology  [facility] - Oncology ([facility])

## 2024-11-24 NOTE — SUBJECTIVE & OBJECTIVE
Interval History: NAEON. Patient's glucose dropped to 58, received glucose tabs. Started on D5W infusion due to inability to tolerate PO.    Oncology Treatment Plan:   OP GI mFOLFOX6 (oxaliplatin leucovorin fluorouracil) Q2W    Medications:  Continuous Infusions:   D5W   Intravenous Continuous 100 mL/hr at 11/24/24 1115 New Bag at 11/24/24 1115     Scheduled Meds:   cyproheptadine  4 mg Oral TID    heparin (porcine)  5,000 Units Subcutaneous Q8H    hydroCHLOROthiazide  25 mg Oral Daily    NIFEdipine  60 mg Oral Daily PM    polyethylene glycol  17 g Oral Daily    senna-docusate 8.6-50 mg  1 tablet Oral Daily     PRN Meds:  Current Facility-Administered Medications:     dextrose 10%, 12.5 g, Intravenous, PRN    dextrose 10%, 25 g, Intravenous, PRN    dicyclomine, 10 mg, Oral, TID AC PRN    glucagon (human recombinant), 1 mg, Intramuscular, PRN    glucose, 16 g, Oral, PRN    glucose, 24 g, Oral, PRN    insulin aspart U-100, 0-5 Units, Subcutaneous, QID (AC + HS) PRN    naloxone, 0.02 mg, Intravenous, PRN    ondansetron, 8 mg, Oral, Q8H PRN    oxyCODONE, 5 mg, Oral, Q6H PRN    prochlorperazine, 5 mg, Oral, TID PRN    sodium chloride 0.9%, 10 mL, Intravenous, Q12H PRN     Review of Systems   Constitutional:  Positive for appetite change and fatigue. Negative for chills and fever.   HENT:  Negative for congestion.    Respiratory:  Negative for cough, choking and shortness of breath.    Cardiovascular:  Negative for chest pain and leg swelling.   Gastrointestinal:  Positive for abdominal distention, abdominal pain, constipation and vomiting. Negative for diarrhea.   Genitourinary:  Negative for difficulty urinating and dysuria.   Neurological:  Negative for syncope, speech difficulty, numbness and headaches.   Psychiatric/Behavioral:  Negative for confusion.      Objective:     Vital Signs (Most Recent):  Temp: 97.4 °F (36.3 °C) (11/24/24 1058)  Pulse: 93 (11/24/24 1058)  Resp: 18 (11/24/24 1058)  BP: 100/67 (11/24/24  1058)  SpO2: 95 % (11/24/24 1058) Vital Signs (24h Range):  Temp:  [97.3 °F (36.3 °C)-97.9 °F (36.6 °C)] 97.4 °F (36.3 °C)  Pulse:  [] 93  Resp:  [13-22] 18  SpO2:  [95 %-100 %] 95 %  BP: (100-139)/(58-82) 100/67     Weight: 63.3 kg (139 lb 8.8 oz)  Body mass index is 26.37 kg/m².  Body surface area is 1.65 meters squared.      Intake/Output Summary (Last 24 hours) at 11/24/2024 1153  Last data filed at 11/24/2024 0852  Gross per 24 hour   Intake 1698.98 ml   Output 750 ml   Net 948.98 ml        Physical Exam  Vitals and nursing note reviewed.   Constitutional:       General: She is not in acute distress.     Appearance: She is underweight. She is ill-appearing.   HENT:      Head: Normocephalic and atraumatic.      Mouth/Throat:      Mouth: Mucous membranes are dry.      Pharynx: Oropharynx is clear.   Eyes:      General: Scleral icterus present.      Extraocular Movements: Extraocular movements intact.   Cardiovascular:      Rate and Rhythm: Normal rate and regular rhythm.      Heart sounds: Normal heart sounds.   Pulmonary:      Effort: Pulmonary effort is normal. No respiratory distress.      Breath sounds: Normal breath sounds.   Abdominal:      General: Abdomen is flat. There is distension.      Palpations: Abdomen is soft. There is mass (palpable liver).      Tenderness: There is abdominal tenderness (upper abdomen).   Musculoskeletal:      Right lower leg: No edema.      Left lower leg: No edema.   Skin:     General: Skin is warm and dry.   Neurological:      General: No focal deficit present.      Mental Status: She is oriented to person, place, and time and easily aroused. She is lethargic.      Motor: No weakness.   Psychiatric:         Mood and Affect: Mood normal.         Behavior: Behavior normal. Behavior is cooperative.          Significant Labs:   All pertinent labs from the last 24 hours have been reviewed.    Diagnostic Results:  I have reviewed all pertinent imaging results/findings within  the past 24 hours.

## 2024-11-24 NOTE — ASSESSMENT & PLAN NOTE
Patient's FSGs are uncontrolled due to hypoglycemia on current medication regimen.  Last A1c reviewed-   Lab Results   Component Value Date    HGBA1C 4.8 11/04/2024     Most recent fingerstick glucose reviewed-   Recent Labs   Lab 11/24/24  0754 11/24/24  0912 11/24/24  1019 11/24/24  1046   POCTGLUCOSE 58* 63* 70 78     Current correctional scale  Low  Maintain anti-hyperglycemic dose as follows-   Antihyperglycemics (From admission, onward)      Start     Stop Route Frequency Ordered    11/24/24 0816  insulin aspart U-100 pen 0-5 Units         -- SubQ Before meals & nightly PRN 11/24/24 0716          Hold Oral hypoglycemics while patient is in the hospital.  D5W due to poor PO intake.

## 2024-11-24 NOTE — ASSESSMENT & PLAN NOTE
- POC lactate 13.75  - VBG pH 7.19, CO2 32.7, HCO3 12.5  - WBC 13.07 from 11.35  - Afebrile since admission, denies fever/chills  - Blood cx pending  - Urine cx pending  - Holding on abx until cx return

## 2024-11-24 NOTE — PROGRESS NOTES
[facility]avez - Oncology ([facility])  Hematology/Oncology  Progress Note    Patient Name: Tosin Ma  Admission Date: 11/23/2024  Hospital Length of Stay: 1 days  Code Status: Full Code     Subjective:     HPI:  Ms. Ma is a pleasant 70 yo woman with PMHx of HTN, HLD, T2DM, breast cancer, and colon adenocarcinoma with mets to the liver on OP GI mFOLFOX6 (oxaliplatin leucovorin fluorouracil) Q2W, Day 1 Cycle 2 on hold, patient is known to Dr. Parham. She was recently discharged on 11/19/2024. Presented to [facility] ED today with the c/o vomiting and diarrhea. She said she has not being able to keep food or fluid down since she was discharged, and also having loose stools. She denies fever, chest pain, dysuria, headache, or abdominal pain. However endorses nausea and weakness.    In ED today, her BP was on the soft side (115/58), normal pulse, saturating well on RA. Labs were remarkable for Hb of 8.7, platelet of 614, CR OF 1.3 (Her baseline line was 0.9), Glucose of 65, ALP of 1663, Hi- 17.1, AST 0f 173, ALT of 105. CT abdo with contrast performewd today at the ED shows markedly enlarged heterogeneous appearance of the liver consistent with biopsy-proven metastasis, mass effect of the left hepatic lobe on the gastric fundus which appears more prominent compared to prior study, and fluid in the distal esophagus suggesting either reflux or poor esophageal motility.    We will admit to Hem Onc for further management.    Interval History: NAEON. Patient's glucose dropped to 58, received glucose tabs. Started on D5W infusion due to inability to tolerate PO.    Oncology Treatment Plan:   OP GI mFOLFOX6 (oxaliplatin leucovorin fluorouracil) Q2W    Medications:  Continuous Infusions:   D5W   Intravenous Continuous 100 mL/hr at 11/24/24 1115 New Bag at 11/24/24 1115     Scheduled Meds:   cyproheptadine  4 mg Oral TID    heparin (porcine)  5,000 Units Subcutaneous Q8H    hydroCHLOROthiazide  25 mg Oral Daily    NIFEdipine   60 mg Oral Daily PM    polyethylene glycol  17 g Oral Daily    senna-docusate 8.6-50 mg  1 tablet Oral Daily     PRN Meds:  Current Facility-Administered Medications:     dextrose 10%, 12.5 g, Intravenous, PRN    dextrose 10%, 25 g, Intravenous, PRN    dicyclomine, 10 mg, Oral, TID AC PRN    glucagon (human recombinant), 1 mg, Intramuscular, PRN    glucose, 16 g, Oral, PRN    glucose, 24 g, Oral, PRN    insulin aspart U-100, 0-5 Units, Subcutaneous, QID (AC + HS) PRN    naloxone, 0.02 mg, Intravenous, PRN    ondansetron, 8 mg, Oral, Q8H PRN    oxyCODONE, 5 mg, Oral, Q6H PRN    prochlorperazine, 5 mg, Oral, TID PRN    sodium chloride 0.9%, 10 mL, Intravenous, Q12H PRN     Review of Systems   Constitutional:  Positive for appetite change and fatigue. Negative for chills and fever.   HENT:  Negative for congestion.    Respiratory:  Negative for cough, choking and shortness of breath.    Cardiovascular:  Negative for chest pain and leg swelling.   Gastrointestinal:  Positive for abdominal distention, abdominal pain, constipation and vomiting. Negative for diarrhea.   Genitourinary:  Negative for difficulty urinating and dysuria.   Neurological:  Negative for syncope, speech difficulty, numbness and headaches.   Psychiatric/Behavioral:  Negative for confusion.      Objective:     Vital Signs (Most Recent):  Temp: 97.4 °F (36.3 °C) (11/24/24 1058)  Pulse: 93 (11/24/24 1058)  Resp: 18 (11/24/24 1058)  BP: 100/67 (11/24/24 1058)  SpO2: 95 % (11/24/24 1058) Vital Signs (24h Range):  Temp:  [97.3 °F (36.3 °C)-97.9 °F (36.6 °C)] 97.4 °F (36.3 °C)  Pulse:  [] 93  Resp:  [13-22] 18  SpO2:  [95 %-100 %] 95 %  BP: (100-139)/(58-82) 100/67     Weight: 63.3 kg (139 lb 8.8 oz)  Body mass index is 26.37 kg/m².  Body surface area is 1.65 meters squared.      Intake/Output Summary (Last 24 hours) at 11/24/2024 1153  Last data filed at 11/24/2024 0852  Gross per 24 hour   Intake 1698.98 ml   Output 750 ml   Net 948.98 ml         Physical Exam  Vitals and nursing note reviewed.   Constitutional:       General: She is not in acute distress.     Appearance: She is underweight. She is ill-appearing.   HENT:      Head: Normocephalic and atraumatic.      Mouth/Throat:      Mouth: Mucous membranes are dry.      Pharynx: Oropharynx is clear.   Eyes:      General: Scleral icterus present.      Extraocular Movements: Extraocular movements intact.   Cardiovascular:      Rate and Rhythm: Normal rate and regular rhythm.      Heart sounds: Normal heart sounds.   Pulmonary:      Effort: Pulmonary effort is normal. No respiratory distress.      Breath sounds: Normal breath sounds.   Abdominal:      General: Abdomen is flat. There is distension.      Palpations: Abdomen is soft. There is mass (palpable liver).      Tenderness: There is abdominal tenderness (upper abdomen).   Musculoskeletal:      Right lower leg: No edema.      Left lower leg: No edema.   Skin:     General: Skin is warm and dry.   Neurological:      General: No focal deficit present.      Mental Status: She is oriented to person, place, and time and easily aroused. She is lethargic.      Motor: No weakness.   Psychiatric:         Mood and Affect: Mood normal.         Behavior: Behavior normal. Behavior is cooperative.          Significant Labs:   All pertinent labs from the last 24 hours have been reviewed.    Diagnostic Results:  I have reviewed all pertinent imaging results/findings within the past 24 hours.  Assessment/Plan:     * Nausea & vomiting  Unable to tolerate PO. Immediately vomiting anything she eats/drinks. Likely 2/2 to hepatomegaly compressing stomach.  - D5W  - Advance diet as tolerated  - Optimize bowel regimen    Metabolic acidosis  - POC lactate 13.75  - VBG pH 7.19, CO2 32.7, HCO3 12.5  - WBC 13.07 from 11.35  - Afebrile since admission, denies fever/chills  - Blood cx pending  - Urine cx pending  - Holding on abx until cx return      Constipation  Last Bowel  Movement: 11/19/24  - Optimize bowel regimen    ANDRES (acute kidney injury)  ANDRES is likely due to pre-renal azotemia due to dehydration. Baseline creatinine is  0.9-1.0 . Most recent creatinine and eGFR are listed below.  Recent Labs     11/23/24  1210 11/24/24  0509   CREATININE 1.3 1.6*   EGFRNORACEVR 45.1* 35.1*      Plan  - ANDRES is worsening. Will continue current treatment  - Avoid nephrotoxins and renally dose meds for GFR listed above  - Monitor urine output, serial BMP, and adjust therapy as needed  - 2L in ED. Continuous @100cc/hr x5 hours yesterday after admission.   - D5W 100cc/hr x10hr  - Urine studies ordered    Colon adenocarcinoma  Ms Ma is a 68 yo woman with newly diagnosed metastatic adenocarcinoma to the liver. CT A/P showed a mass in the descending colon. Large disease burden in the liver. High bilirubin due to disease burden. Discussed with patient and family the prognosis of a stage IV colon cancer with extensive liver mets. Palliative FOLFOX last admission due to rising LFTs and Tbili.     Plan  - Monitor LFTs  - CEA pending    Serum total bilirubin elevated  Bilirubin 17.1 upon admission. Was elevated between 9-11 last admission.    Plan:  - Daily LFTs  - Continue to monitor    Metastasis to liver  68 yo woman with newly diagnosed metastatic adenocarcinoma to the liver. CT A/P showed a mass in the descending colon. Large disease burden in the liver. High bilirubin due to disease burden.     CT 11/04: Hepatomegaly with multiple hypoattenuating hepatic masses consistent with metastatic disease   On admission ALP 1,549 with bilirubin 10.0 with scleral icterus. Hepatic metastatic lesions likely etiology causing obstruction. Causing right sided abdominal pain and nausea/emesis. Will initiate FOLFOX 11/16 inpatient in hopes of reducing size of hepatic lesions. No increased leukocytosis, hypotension, low suspicion for cholangitis at this time.fluorouraciLchemo infusion over 23 hours and oxaliplatin   2hours with leucovourin infusion concurrently initiated 11/16 night at 2033. Therapy for 46 hours     - Zofran 8 mg q8h PRN for nausea  - Prochlorperazine 5 mg q6h PRN for nausea  - Oxycodone 5 mg q6h PRN for moderate to severe pain  - Morphine 2mg q4h PRN for breakthrough    Type 2 diabetes mellitus with hyperglycemia, without long-term current use of insulin  Patient's FSGs are uncontrolled due to hypoglycemia on current medication regimen.  Last A1c reviewed-   Lab Results   Component Value Date    HGBA1C 4.8 11/04/2024     Most recent fingerstick glucose reviewed-   Recent Labs   Lab 11/24/24  0754 11/24/24  0912 11/24/24  1019 11/24/24  1046   POCTGLUCOSE 58* 63* 70 78     Current correctional scale  Low  Maintain anti-hyperglycemic dose as follows-   Antihyperglycemics (From admission, onward)      Start     Stop Route Frequency Ordered    11/24/24 0816  insulin aspart U-100 pen 0-5 Units         -- SubQ Before meals & nightly PRN 11/24/24 0716          Hold Oral hypoglycemics while patient is in the hospital.  D5W due to poor PO intake.             Christi Lu MD  Hematology/Oncology  [facility] - Oncology ([facility])

## 2024-11-24 NOTE — ASSESSMENT & PLAN NOTE
ANDRES is likely due to pre-renal azotemia due to dehydration. Baseline creatinine is  0.9-1.0 . Most recent creatinine and eGFR are listed below.  Recent Labs     11/23/24  1210 11/24/24  0509   CREATININE 1.3 1.6*   EGFRNORACEVR 45.1* 35.1*      Plan  - ANDRES is worsening. Will continue current treatment  - Avoid nephrotoxins and renally dose meds for GFR listed above  - Monitor urine output, serial BMP, and adjust therapy as needed  - 2L in ED. Continuous @100cc/hr x5 hours yesterday after admission.   - D5W 100cc/hr x10hr  - Urine studies ordered

## 2024-11-25 PROBLEM — R19.7 DIARRHEA: Status: ACTIVE | Noted: 2024-01-01

## 2024-11-25 PROBLEM — Z71.89 GOALS OF CARE, COUNSELING/DISCUSSION: Status: ACTIVE | Noted: 2024-01-01

## 2024-11-25 PROBLEM — Z51.5 PALLIATIVE CARE ENCOUNTER: Status: ACTIVE | Noted: 2024-01-01

## 2024-11-25 PROBLEM — G93.41 ENCEPHALOPATHY, METABOLIC: Status: ACTIVE | Noted: 2024-01-01

## 2024-11-25 PROBLEM — A41.9 SEPSIS WITH ACUTE ORGAN DYSFUNCTION: Status: ACTIVE | Noted: 2024-01-01

## 2024-11-25 PROBLEM — R65.20 SEPSIS WITH ACUTE ORGAN DYSFUNCTION: Status: ACTIVE | Noted: 2024-01-01

## 2024-11-25 NOTE — ASSESSMENT & PLAN NOTE
Advance Care Planning      Spoke with  (Dylan Ma) briefly at bedside about transfer to the MICU for worsening ANDRES and lactic acidosis requiring trialysis line placement and SLED initiation. Relayed concerns and worries regarding patient's poor prognosis s/t cancers with metastases and new dialysis requirement.  appears understanding of patient's critical illness; appropriately upset. Will remain full code at this time and initiate CRRT in hopes of improving kidney function.    - Recommend palliative consult for ongoing GOC

## 2024-11-25 NOTE — ASSESSMENT & PLAN NOTE
Takes hydrochlorothiazide and nifedipine at home.    - Continue home medications  - Inpatient SBP goal <180

## 2024-11-25 NOTE — HPI
Tosin Ma is a 69-year-old woman with a history of stage IV colon adenocarcinoma with metastasis to the liver (followed by Dr. Manriquez), breast cancer, DM2, who was admitted for intractable nausea/vomiting, course complicated by oliguric acute renal failure and stepped up to the MICU for CRRT. Unfortunately despite aggressive medical management, Mrs. Ma has approached end of life and family agreed to focus on comfort measures, including discontinuation of life-sustaining measures to avoid prolongation of suffering. Palliative and Supportive Care was consulted to explore goals of care and transfer to the inpatient hospice/palliative care unit.

## 2024-11-25 NOTE — ASSESSMENT & PLAN NOTE
"Hx of breast cancer, colon adenocarcinoma, and recent metastases to the liver. CT from 11/4/24 consistent with "hepatomegaly with multiple hypo-attenuating hepatic masses consistent with metastatic disease".     On admission, ALP 1549, bili 10.0 with associated scleral icterus. Hepatic metastatic lesions likely etiology causing obstruction and right-sided abd pain along with n/v. Was started on FOLFOX on 11/16 per Onc in hopes of reducing size of hepatic lesions.     Overnight on 11/24/25, pt. With lactic acidosis >12 and ammonia 120.    - Completed chemo 11/18  - Antiemetics PRN for nausea/vomiting  - Oxycodone PRN for moderate pain  - Morphine PRN for severe pain  - Med/Onc following; appreciate assistance  - Daily CMP; trend LFTs  - Continue lactulose  "

## 2024-11-25 NOTE — ASSESSMENT & PLAN NOTE
Hx of DM2. Not on home insulin. A1C 4.8 on 11/4/24.    - Inpatient goal 140-180  - LD SSI  - Hypoglycemia protocol

## 2024-11-25 NOTE — ASSESSMENT & PLAN NOTE
Impression:  Tosin Ma is a 69 y.o. female with PMHx of HTN, HLD, DM2, breast cancer, and colon adenocarcinoma with mets to the liver (on OP GI mFOLFOX6 (oxaliplatin leucovorin fluorouracil) Q2W, Day 1 Cycle 2 on hold). Pt. Is known to Dr. Parham. She presented to the ED on 11/23/24 with c/o vomiting, diarrhea, generalized weakness, and inability to keep food or fluid down since 11/19/24. Patient was initially admitted to Hem/Onc services, but subsequently transferred to MICU for worsening kidney function, increased lactic acidosis, and hypotension.    Patient is currently resting in bed. Appears to be asleep. Awakens with gently touch, but falls back asleep shortly after. Patient is in the MICU, on pressors, and SLED. Patient's , Mr. Brandon Ma, is at bedside. He is amenable to Palliative Medicine.      Palliative Medicine was consulted by primary, Dr. Levine, for goals of care and advanced care planning discussions. During discussion, we called each of patient's adult children, Brandon Alfred And Vianey, at different times. Tentative family meeting scheduled for 1pm on 11/25/24.      Advance Care Planning     Date: 11/25/2024    Today a voluntary meeting took place: bedside    Patient Participation: Patient is unable to participate     Attendees (Name and  Relationship to patient): Legal surrogate decision-maker: Mr. Brandon Ma  and daughter (Vianey), son (Donavan Alfred), sister, sister-in-law (Neela who is a nurse of 50 years), brother-in-law (Mr. Carballo), daughter-in-law, and grandson    Staff attendees (Name and  Role): FLAVIO Chacon DNP of Palliative Medicine    ACP Conversation (General): I engaged the family in a voluntary conversation about advance care planning and we specifically addressed what the goals of care would be moving forward, in light of the patient's change in clinical status, specifically patient's colon adenocarcinoma with extensive metastasis to liver, sepsis, ANDRES  on SLED, and need for vasopressors.  We did specifically address the patient's likely prognosis, which appears to be poor.  We explored the patient's values and preferences for future care.       Code Status: In light of the patients advanced and life limiting illness, I engaged the family in a voluntary conversation about the patient's preferences for care  at the very end of life. The patient wishes to have a natural, peaceful death.  Along those lines, the patient does not wish to have CPR or other invasive treatments performed when her heart and/or breathing stops. I communicated to the family that a DNR order would be placed in her medical record to reflect this preference.    Goals of care: The family endorses that what is most important right now is to focus on symptom/pain control, quality of life, even if it means sacrificing a little time, and comfort and QOL     Accordingly, we have decided that the best plan to meet the patient's goals includes pivot to comfort-focused care      Recommendations/  Follow-up tasks: Other (specify below) will reassess patient on 11/26/24 for candidacy for HPU if needed. Family appreciative of the aggressive care they have been getting while in MICU and are amenable to staying under the care of MICU at this time.                  Life Limiting Diagnosis  Metabolic Encephalopathy  - pivot to comfort-focused care    ANDRES  - on SLED  - pivot to comfort-focused care    Sepsis  - pivot to comfort-focused care    Colon adenocarcinoma  - Metastasis to liver  - pivot to comfort-focused care        Symptom Management  Dyspnea  - morphine 4mg q15min PRN RR >30    Pain  - morphine 4mg q15min PRN pain/pain behaviors    Anxiety  - lorazepam 0.5mg q30min PRN anxiety/HR >100    Vomiting/Nausea  - ondansetron 4mg    Recommendations  - Continue management per primary team  - Pivot to comfort-focused care. Consider utilizing comfort-focused care order set.  - I will continue to follow as  patient is transitioning to comfort focused care.       Thank you for consulting Palliative Medicine.

## 2024-11-25 NOTE — PROGRESS NOTES
Per chart screen, pt has a hx of cancer and chemo completed on 11/18. Pt admitted to HemOnc on 11/23 for mets to liver, ANDRES, diarrhea. Rapid called on 11/24 to eval for neuro, circulatory. Pt transferred to MICU for trialysis placement and SLED.  Levo continues.    Barriers: not medically clear  Discharge plan A:  pending clinical outcome  LUKE: 11/29      CM will assess for discharge needs.     Santana Holm RN CM  11/25/2024

## 2024-11-25 NOTE — HPI
Ms. Ma, a 69F with PMHx of HTN, HLD, T2DM, breast cancer, and metastatic colon adenocarcinoma (liver mets, on mFOLFOX6, Cycle 2 on hold), known to Dr. Parham, presented to [facility] ED with vomiting and diarrhea since discharge on 11/19/2024. She reports inability to tolerate food or fluids, loose stools, nausea, and weakness but denies fever, chest pain, dysuria, headache, or abdominal pain.  In the ED, BP was 115/58, pulse normal, SpO2 on RA. Labs notable for Hb 8.7, platelets 614, Cr 1.3 (baseline 0.9), glucose 65, ALP 1663, bilirubin 17.1, , . CT abdomen showed markedly enlarged liver with heterogeneous metastases, increased mass effect of the left hepatic lobe on the gastric fundus, and distal esophageal fluid suggestive of reflux or poor motility.    Nephrology consulted for Oliguric ANDRES with HAGMA, lactic acidosis.

## 2024-11-25 NOTE — EICU
Intervention Initiated From:  Bedside    Malik intervened regarding:  Documentation and Time-Out    Comments: Called into room via eLert to do time-out for RIJ Trialysis placement & for assistance w/ documentation (see time-out record).  LDA added in Epic. CXR order placed.

## 2024-11-25 NOTE — PROGRESS NOTES
[facility] - Medical ICU  Initial Discharge Assessment       Primary Care Provider: Lake Cunningham MD    Admission Diagnosis: Dehydration [E86.0]  Lactic acidosis [E87.20]  Colon cancer [C18.9]  Metabolic acidosis [E87.20]  Chest pain [R07.9]  Nausea and vomiting, unspecified vomiting type [R11.2]    Admission Date: 11/23/2024  Expected Discharge Date: 11/29/2024    Transition of Care Barriers: None    Payor: [employer] / Plan: [employer] [place] / Product Type: HMO /     Extended Emergency Contact Information  Primary Emergency Contact: Dylan Ma   [facility]  Home Phone: [phone]  Relation: Spouse    Discharge Plan A: Inpatient Hospice  Discharge Plan B: Hospice/home      [facility] [place] [address] [address] [address]  Phone: [phone] Fax: [phone]    [id number] [address] AT [address] [address]  Phone: [phone] Fax: [phone]      Initial Assessment (most recent)       Adult Discharge Assessment - 11/25/24 1500          Discharge Assessment    Assessment Type Discharge Planning Assessment     Source of Information health record     Communicated LUKE with patient/caregiver Date not available/Unable to determine     Reason For Admission ANDRES     People in Home spouse     Facility Arrived From: Home     Do you expect to return to your current living situation? --   Palliative consulted and plan is to transition to comfort care.    Prior to hospitilization cognitive status: Alert/Oriented     Current cognitive status: Unable to Assess     Equipment Currently Used at Home shower chair     Readmission within 30 days? Yes     Do you take prescription medications? Yes     Do you have prescription coverage? Yes     Coverage [facility]     Who is going to help you get home at discharge? family      How do you get to doctors appointments? family or friend will provide     Are you on dialysis? --   SLED this admission    Discharge Plan A Inpatient Hospice     Discharge Plan B Hospice/home     Transition of Care Barriers None                   Initial assessment complete by chart screen in light of family's decision to transition pt to comfort care.  Pt lives alone with spouse and recently completed chemo on 11/18 for colon cancer. CM, as part of the interdisciplinary team, will remain available and assess for discharge needs.         Santana Holm RN CM  11/25/2024

## 2024-11-25 NOTE — ASSESSMENT & PLAN NOTE
Baseline Scr 0.9, at consult 2.3  K on Istat at consult >9, HCO3 11, PH 7.2, Lactate > 12  Oliguria, rising BUN  Receiving Chemotherapy  Advanced colonic Ca with Liver Mets    Plan/Recommendations;  -Consult Critical Care for Trialysis Line placement  -Will start her on SLED 4 hours (Consent placed in Chart)  -Meanwhile shift K  =Strict I/Os

## 2024-11-25 NOTE — SUBJECTIVE & OBJECTIVE
Past Medical History:   Diagnosis Date    Breast cancer     Right    Diabetes mellitus     Hypertension     Liver lesion 2024       Past Surgical History:   Procedure Laterality Date    BIOPSY OF LIVER WITH ULTRASOUND GUIDANCE N/A 2024    Procedure: BIOPSY, LIVER, WITH US GUIDANCE;  Surgeon: Gisell Spence MD;  Location: [facility] CATH LAB;  Service: Interventional Radiology;  Laterality: N/A;    BREAST BIOPSY Left 10/06/2017    BREAST BIOPSY Right 1996    BREAST LUMPECTOMY Right     + CA     SECTION      , ,     COLONOSCOPY N/A 2019    Procedure: COLONOSCOPY;  Surgeon: Fredrick Bailey MD;  [facility]  Service: Endoscopy;  Laterality: N/A;    HYSTERECTOMY         Review of patient's allergies indicates:  No Known Allergies    Family History       Problem Relation (Age of Onset)    Breast cancer Sister (48), Cousin, Cousin (48), Daughter    Pancreatic cancer Mother          Tobacco Use    Smoking status: Never    Smokeless tobacco: Never   Substance and Sexual Activity    Alcohol use: No    Drug use: No    Sexual activity: Yes     Partners: Male     Birth control/protection: See Surgical Hx      Review of Systems   Unable to perform ROS: Acuity of condition     Objective:     Vital Signs (Most Recent):  Temp: 97.8 °F (36.6 °C) (24 030)  Pulse: 103 (24 040)  Resp: 12 (24)  BP: (!) 94/55 (24)  SpO2: 100 % (24) Vital Signs (24h Range):  Temp:  [97.3 °F (36.3 °C)-98.2 °F (36.8 °C)] 97.8 °F (36.6 °C)  Pulse:  [] 103  Resp:  [12-21] 12  SpO2:  [95 %-100 %] 100 %  BP: ()/(51-76) 94/55   Weight: 63.3 kg (139 lb 8.8 oz)  Body mass index is 26.37 kg/m².      Intake/Output Summary (Last 24 hours) at 2024 0441  Last data filed at 2024 2217  Gross per 24 hour   Intake 1085.95 ml   Output 300 ml   Net 785.95 ml          Physical Exam  Vitals and nursing note reviewed.   Constitutional:        Appearance: She is ill-appearing.   HENT:      Mouth/Throat:      Mouth: Mucous membranes are dry.      Pharynx: Oropharynx is clear.   Eyes:      General: Scleral icterus present.      Extraocular Movements: Extraocular movements intact.      Pupils: Pupils are equal, round, and reactive to light.      Comments: 3+ bilaterally   Neck:      Comments: R. IJ trialysis  Cardiovascular:      Rate and Rhythm: Regular rhythm. Tachycardia present.      Pulses: Normal pulses.      Heart sounds: Normal heart sounds.      Comments: ST  Pulmonary:      Effort: Pulmonary effort is normal. Tachypnea present.      Breath sounds: Normal breath sounds.   Abdominal:      General: There is distension.      Palpations: Abdomen is soft.   Genitourinary:     Comments: External walker  Musculoskeletal:         General: Normal range of motion.   Skin:     General: Skin is warm and dry.      Capillary Refill: Capillary refill takes less than 2 seconds.      Findings: Bruising present.   Neurological:      General: No focal deficit present.      Mental Status: She is easily aroused. She is lethargic and disoriented.      GCS: GCS eye subscore is 3. GCS verbal subscore is 4. GCS motor subscore is 6.   Psychiatric:         Behavior: Behavior is cooperative.          Vents:     Lines/Drains/Airways       Central Venous Catheter Line  Duration             Trialysis (Dialysis) Catheter 11/25/24 0218 right internal jugular <1 day              Drain  Duration             Female External Urinary Catheter w/ Suction 11/23/24 1407 1 day              Peripheral Intravenous Line  Duration                  Peripheral IV - Single Lumen 11/23/24 1100 Anterior;Proximal;Right Forearm 1 day                  Significant Labs:    CBC/Anemia Profile:  Recent Labs   Lab 11/24/24  0509 11/24/24  2045 11/24/24  2219 11/24/24  2357 11/25/24  0345   WBC 13.07* 14.63*  --   --  15.04*   HGB 8.3* 8.4*  --   --  8.5*   HCT 26.8* 26.8* 30* 52 26.6*   * 577*  --    --  576*   MCV 92 90  --   --  90   RDW 18.4* 18.0*  --   --  18.2*        Chemistries:  Recent Labs   Lab 11/24/24  0509 11/24/24  2045 11/24/24  2343 11/25/24  0345    134* 132* 134*  134*   K 3.8 4.1 4.1 4.2  4.2   CL 98 95 95 96  96   CO2 10* 9* 8* 9*  9*   BUN 56* 65* 61* 68*  68*   CREATININE 1.6* 2.3* 2.3* 2.5*  2.5*   CALCIUM 9.2 8.9 8.9 8.7  8.7   ALBUMIN 1.6* 1.5*  --  1.5*  1.5*   PROT 5.8* 5.6*  --  5.6*   BILITOT 17.3* 16.5*  --  18.1*   ALKPHOS 1,595* 1,719*  --  1,798*   ALT 99* 109*  --  116*   * 183*  --  220*   MG 2.7*  --   --  2.6  2.6   PHOS 2.8  --   --  3.4  3.4       All pertinent labs within the past 24 hours have been reviewed.    Significant Imaging: I have reviewed all pertinent imaging results/findings within the past 24 hours.

## 2024-11-25 NOTE — PROGRESS NOTES
I saw patient at the bedside. Patient altered, GSC 14 with Aox2. Poor historian.  at bedside.    Gen - appears ill  Pulm - no resp distress  Abd - non distended  Extr - (+)2 bl le edema  Neuro - aox2    Acute encephalopathy - tachycardic otherwise stable vitals. White count 14. H&H 8/26, plts 577. CMP shows Co2 9, AG 30, creatinine 2.3. Elevated AP and TB. AST//109. Ammonia 120. Lactic 12. Negative acetaminophen and salicylate levels. Negative acetaminophen levels. VBG shows pH 7.213, co2 27, bicarb 11, potassium >9, na 123. CT head and chest pending. Patient has a (+) fluid balance of 3 Liters and is now oliguric. Patient given Ammonia. Infection and uremia on ddx. Recommend dialysis. Empirical antibiotics given.   Anemia - likely associated with CKD. No transfusion indicated at this time.   ANDRES - creatinine 2.3. Patient is overloaded. I consulted nephrology who recommends emergent dialysis. Intesivist consulted. May need transfer to ICU  Metabolic acidosis - co2 11, pH 7.2, lactic >12. Bicarb given. Recommend HD.   SIRS - white count 14, pt tachycardic. No source identified so far. Blood cultures from earlier today negative. Start antibiotics.   Hyponatremia - na 123. HD recommended.

## 2024-11-25 NOTE — CONSULTS
Patient seen and evaluated by critical care medicine. To be admitted to ICU for further management. Full H&P to follow.     OBDULIO Henry-BC  Pulmonary Critical Care  11/25/2024

## 2024-11-25 NOTE — PLAN OF CARE
Problem: Adult Inpatient Plan of Care  Goal: Plan of Care Review  Outcome: Progressing  Goal: Patient-Specific Goal (Individualized)  Outcome: Progressing  Goal: Absence of Hospital-Acquired Illness or Injury  Outcome: Progressing  Goal: Optimal Comfort and Wellbeing  Outcome: Progressing  Goal: Readiness for Transition of Care  Outcome: Progressing     Problem: Diabetes Comorbidity  Goal: Blood Glucose Level Within Targeted Range  Outcome: Progressing     Problem: Acute Kidney Injury/Impairment  Goal: Fluid and Electrolyte Balance  Outcome: Progressing  Goal: Improved Oral Intake  Outcome: Progressing  Goal: Effective Renal Function  Outcome: Progressing     Problem: Skin Injury Risk Increased  Goal: Skin Health and Integrity  Outcome: Progressing     Problem: CRRT (Continuous Renal Replacement Therapy)  Goal: Safe, Effective Therapy Delivery  Outcome: Progressing  Goal: Hemodynamic Stability  Outcome: Progressing  Goal: Body Temperature Maintained in Desired Range  Outcome: Progressing  Goal: Absence of Infection Signs and Symptoms  Outcome: Progressing     Problem: Infection  Goal: Absence of Infection Signs and Symptoms  Outcome: Progressing     Problem: Sepsis/Septic Shock  Goal: Optimal Coping  Outcome: Progressing  Goal: Absence of Bleeding  Outcome: Progressing  Goal: Blood Glucose Level Within Targeted Range  Outcome: Progressing  Goal: Absence of Infection Signs and Symptoms  Outcome: Progressing  Goal: Optimal Nutrition Intake  Outcome: Progressing     Problem: Coping Ineffective  Goal: Effective Coping  Outcome: Progressing     MICU DAILY GOALS     Family/Goals of care/Code Status   Code Status: DNR    24H Vital Sign Range  Temp:  [96.4 °F (35.8 °C)-97.9 °F (36.6 °C)]   Pulse:  []   Resp:  [11-21]   BP: ()/(51-70)   SpO2:  [95 %-100 %]      Shift Events (include procedures and significant events)   Pt transitioned to comfort care    AWAKE RASS: Goal -    Actual -      Restraint necessity:  Not necessary   BREATHE SBT: Not intubated    Coordinate A & B, analgesics/sedatives Pain: managed   SAT: Not intubated   Delirium CAM-ICU: Overall CAM-ICU: Positive   Early(intubated/ Progressive (non-intubated) Mobility MOVE Screen (INTUBATED ONLY): Not intubated    Activity: Activity Management: Rolling - L1   Feeding/Nutrition Diet order: Diet/Nutrition Received: NPO,     Thrombus DVT prophylaxis: VTE Core Measure: Per order contraindicated for SCDs/Anticoagulants   HOB Elevation Head of Bed (HOB) Positioning: HOB elevated   Ulcer Prophylaxis GI: comfort care initiated   Glucose control managed Glycemic Management: blood glucose monitored   Skin Skin assessment:     Sacrum intact/not altered? Yes  Heels intact/not altered? Yes  Surgical wound? Yes    CHECK ONE!   (no altered skin or altered skin) and sub boxes:  [x] No Altered Skin Integrity Present    []Prevention Measures Documented    [] Altered Skin Integrity Present or Discovered   [] LDA present in EPIC, daily doc completed              [] LDA added if not in EPIC (describe wound).                    When describing wound, do not stage, use descriptive words only.    [] Wound Image Taken (required on admit,                   transfer/discharge and every Tuesday)    Wound Care Consulted? No   Bowel Function constipation    Indwelling Catheter Necessity      Trialysis (Dialysis) Catheter 11/25/24 0218 right internal jugular-Line Necessity Review: CRRT/HD       De-escalation Antibiotics Yes        VS and assessment per flow sheet, patient progressing towards goals as tolerated, plan of care reviewed with [unfilled] and family, all concerns addressed, will continue to monitor.

## 2024-11-25 NOTE — CARE UPDATE
Family conference held with spouse, Dylan Ma, son, daughter, and extended family this afternoon. They were updated on Mrs. Ma current medical conditions: colon cancer with metastasis to liver, liver dysfunction, encephalopathy, acute renal failure, and shock. I explained that Mrs. Ma is in critical condition, currently requiring multiple forms of life support. All questions were answered. Family  stated that it would not be in alignment with Ms. Ma wishes to continue with these interventions. At the end of our discussion, at the request of family, the decision was made to withdraw life support and transition to comfort measures. Above plan discussed with .     Family conference held with Marni Palliative care NP. Appreciative of palliative care's assistance.     Carol Lebron, RUTH  Pulmonary Critical Care

## 2024-11-25 NOTE — PROGRESS NOTES
11/25/24 1130   Treatment   Treatment Type SLED   Treatment Status Discontinued treatment;Blood returned   Dialyzer Time (hours) 4.16   BVP (Liters) 37.2 L     SLED completed. Blood returned by primary RN. Machine disinfected at this time.

## 2024-11-25 NOTE — SUBJECTIVE & OBJECTIVE
Past Medical History:   Diagnosis Date    Breast cancer     Right    Diabetes mellitus     Hypertension     Liver lesion 2024       Past Surgical History:   Procedure Laterality Date    BIOPSY OF LIVER WITH ULTRASOUND GUIDANCE N/A 2024    Procedure: BIOPSY, LIVER, WITH US GUIDANCE;  Surgeon: Gisell Spence MD;  Location: [facility] CATH LAB;  Service: Interventional Radiology;  Laterality: N/A;    BREAST BIOPSY Left 10/06/2017    BREAST BIOPSY Right 1996    BREAST LUMPECTOMY Right     + CA     SECTION      , ,     COLONOSCOPY N/A 2019    Procedure: COLONOSCOPY;  Surgeon: Fredrick Bailey MD;  [facility]  Service: Endoscopy;  Laterality: N/A;    HYSTERECTOMY         Review of patient's allergies indicates:  No Known Allergies  Current Facility-Administered Medications   Medication Frequency    albuterol sulfate nebulizer solution 7.5 mg Q4H PRN    calcium gluconate 100 mg/mL (10%) injection (apheresis) 1,000 mg Once    ceFEPIme injection 2 g Q24H    cyproheptadine 4 mg tablet 4 mg TID    dextrose 10% bolus 125 mL 125 mL PRN    dextrose 10% bolus 125 mL 125 mL PRN    dextrose 10% bolus 250 mL 250 mL PRN    dextrose 10% bolus 250 mL 250 mL PRN    dextrose 5 % and 0.45 % NaCl infusion Continuous    dicyclomine capsule 10 mg TID AC PRN    glucagon (human recombinant) injection 1 mg PRN    glucose chewable tablet 16 g PRN    glucose chewable tablet 24 g PRN    heparin (porcine) injection 5,000 Units Q8H    hydroCHLOROthiazide tablet 25 mg Daily    insulin aspart U-100 pen 0-5 Units QID (AC + HS) PRN    insulin regular injection 10 Units 0.1 mL Once    lactulose 20 gram/30 mL solution Soln 30 g TID    morphine injection 2 mg Q4H PRN    naloxone 0.4 mg/mL injection 0.4 mg PRN    NIFEdipine 24 hr tablet 60 mg Daily PM    ondansetron tablet 8 mg Q8H PRN    oxyCODONE immediate release tablet 5 mg Q6H PRN    polyethylene glycol packet 17 g Daily    prochlorperazine  tablet 5 mg TID PRN    senna-docusate 8.6-50 mg per tablet 1 tablet Daily    sodium bicarbonate solution 100 mEq Once    sodium chloride 0.9% bolus 3,000 mL 3,000 mL Once    sodium chloride 0.9% flush 10 mL Q12H PRN     Family History       Problem Relation (Age of Onset)    Breast cancer Sister (48), Cousin, Cousin (48), Daughter    Pancreatic cancer Mother          Tobacco Use    Smoking status: Never    Smokeless tobacco: Never   Substance and Sexual Activity    Alcohol use: No    Drug use: No    Sexual activity: Yes     Partners: Male     Birth control/protection: See Surgical Hx     Review of Systems   Constitutional:  Positive for fatigue.   Respiratory: Negative.     Cardiovascular: Negative.    Endocrine: Negative.    Genitourinary: Negative.    Neurological: Negative.      Objective:     Vital Signs (Most Recent):  Temp: 97.8 °F (36.6 °C) (11/24/24 2230)  Pulse: 103 (11/24/24 2329)  Resp: 16 (11/24/24 2230)  BP: 114/63 (11/24/24 2230)  SpO2: 98 % (11/24/24 2230) Vital Signs (24h Range):  Temp:  [97.3 °F (36.3 °C)-98.2 °F (36.8 °C)] 97.8 °F (36.6 °C)  Pulse:  [] 103  Resp:  [16-18] 16  SpO2:  [95 %-100 %] 98 %  BP: (100-115)/(63-76) 114/63     Weight: 63.3 kg (139 lb 8.8 oz) (11/23/24 2326)  Body mass index is 26.37 kg/m².  Body surface area is 1.65 meters squared.    I/O last 3 completed shifts:  In: 2999 [P.O.:50; I.V.:582.6; IV Piggyback:2366.4]  Out: 750 [Urine:750]     Physical Exam  Constitutional:       Appearance: She is ill-appearing.   Pulmonary:      Effort: Pulmonary effort is normal. No respiratory distress.   Musculoskeletal:      Right lower leg: No edema.      Left lower leg: No edema.   Skin:     General: Skin is warm.   Neurological:      Mental Status: She is alert.          Significant Labs:  ABGs:   Recent Labs   Lab 11/24/24 2357   PH 7.210*   PCO2 29.2*   HCO3 11.7*   POCSATURATED 91   BE -16*     BMP:   Recent Labs   Lab 11/24/24  0509 11/24/24 2045 11/24/24  2343   GLU 58*    < > 116*      < > 132*   K 3.8   < > 4.1   CL 98   < > 95   CO2 10*   < > 8*   BUN 56*   < > 61*   CREATININE 1.6*   < > 2.3*   CALCIUM 9.2   < > 8.9   MG 2.7*  --   --     < > = values in this interval not displayed.     CBC:   Recent Labs   Lab 11/24/24  2045 11/24/24  2219 11/24/24  2357   WBC 14.63*  --   --    RBC 2.97*  --   --    HGB 8.4*  --   --    HCT 26.8*   < > 52   *  --   --    MCV 90  --   --    MCH 28.3  --   --    MCHC 31.3*  --   --     < > = values in this interval not displayed.       Significant Imaging:  Labs: Reviewed

## 2024-11-25 NOTE — PROGRESS NOTES
Pharmacist Renal Dose Adjustment Note    Tosin Ma is a 69 y.o. female being treated with the medication cefepime.    Patient Data:    Vital Signs (Most Recent):  Temp: 97.5 °F (36.4 °C) (11/24/24 1936)  Pulse: 105 (11/24/24 1936)  Resp: 16 (11/24/24 1936)  BP: 114/70 (11/24/24 1936)  SpO2: 97 % (11/24/24 1936) Vital Signs (72h Range):  Temp:  [97.3 °F (36.3 °C)-98.2 °F (36.8 °C)]   Pulse:  []   Resp:  [13-22]   BP: (100-139)/(58-82)   SpO2:  [95 %-100 %]      Recent Labs   Lab 11/23/24  1210 11/24/24  0509 11/24/24 2045   CREATININE 1.3 1.6* 2.3*     Serum creatinine: 2.3 mg/dL (H) 11/24/24 2045  Estimated creatinine clearance: 20.2 mL/min (A)    Cefepime 2 grams every 8 hours was changed to cefepime 1 gram every 12 hours.     Pharmacist's Name: Goldie Fitzgerald  Pharmacist's Extension: [phone]

## 2024-11-25 NOTE — HPI
Ms. Ma is an extremely pleasant 69yoF with PMHx significant for HTN, HLD, DM2, breast cancer, and colon adenocarcinoma with mets to the liver (on OP GI mFOLFOX6 (oxaliplatin leucovorin fluorouracil) Q2W, Day 1 Cycle 2 on hold). Pt. Is known to Dr. Parham. She presented to the ED on 11/23/24 with c/o vomiting, diarrhea, generalized weakness, and inability to keep food or fluid down since 11/19/24. She denied fever, chills, chest pain, shortness of breath, dysuria, headache, or abd pain.     While in the ED, HDS. Labs significant for Hgb 8.7, plts 614, creatinine of 1.3 (baseline ~0.9), glucose 65, ALP 1663, bili 17.1, AST//105. CT abd/pelvis with markedly enlarged heterogeneous appearance of the liver consistent with biopsy-proven metastasis, mass effect of the left hepatic lobe on the gastric fundus which appears more prominent compared to prior studies, and fluid in the distal esophagus suggesting either reflux of poor esophageal motility. Was admitted initially to Hem/Onc for management.    CCM consulted on the morning of 11/25/24 for worsening renal function with oliguria (creat 2.5, CO2 9), lactic acidosis (LA >12), and encephalopathy. Transferred to MICU for trialysis line placement for CRRT per nephrology.

## 2024-11-25 NOTE — TELEPHONE ENCOUNTER
Dr. Cunningham is out of the office today, 11/25/24, and will return tomorrow 11/26/23. Will forward this encounter to him.

## 2024-11-25 NOTE — PROGRESS NOTES
11/25/24 0623   Treatment   Treatment Type SLED   Treatment Status New start   Dialysis Machine Number [id number]   Dialyzer Time (hours) 0   BVP (Liters) 0 L   Solutions Labeled and Current  Yes   Access Temporary Cath   Catheter Dressing Intact  Yes   Alarms Engaged Yes   CRRT Comments 4 hr sled initiated   Prescription   Time (Hours) Other  (4 hrs)   Dialysate K + (mEq/L) 4   Dialysate CA + (mEq/L) 2.5   Dialysate HCO3 - (Bicarb) (mEq/L) 40   Dialysate Na + (mEq/L) 138   Cartridge Type Other  (revaclear 300)   Dialysate Flow Rate (mL/min) 200   CRRT Hourly Documentation   Blood Flow (mL/min) 150   UF Rate 200 cc/hr   Arterial Pressure (mmHg) 50 mmHg   Venous Pressure (mmHg) 40 mmHg   Effluent Pressure (EP) (mmHg) 30 mmHg     Received report from primary rn, 4 hr sled initiated, accessed via right ij, flushed well, lines secured, r

## 2024-11-25 NOTE — ASSESSMENT & PLAN NOTE
"Somnolent upon exam. Awakens briefly to loud verbal/noxious stimuli but quickly falls back to sleep. Suspect likely s/t hyperammonemia (120) and/or infection. No unilateral weakness or facial droop appreciated upon exam. POC CB.    - CTH without acute abnormalities; "patchy areas of hypoattenuation the periventricular white matter which is nonspecific but favored to represent chronic microvascular ischemic disease"  - MRI recommended to correlate if remains encephalopathic   - Delirium precautions  - Fall precautions  "

## 2024-11-25 NOTE — CONSULTS
[facility] - Medical ICU  Palliative Medicine  Consult Note    Patient Name: Tosin Ma  MRN: [id number]  Admission Date: 11/23/2024  Hospital Length of Stay: 2 days  Code Status: DNR   Attending Provider: Giles Levine*  Consulting Provider: Marni Chacon NP  Primary Care Physician: Lake Cunningham MD  Principal Problem:ANDRES (acute kidney injury)    Patient information was obtained from spouse/SO, relative(s), caregiver / friend, past medical records, and primary team.      Inpatient consult to Palliative Care  Consult performed by: Marni Chacon NP  Consult ordered by: Carol Lebron DNP  Reason for consult: goals of care and advanced care planning discussions      Inpatient consult to Palliative Care  Consult performed by: Marni Chacon NP  Consult ordered by: Carol Lebron DNP  Reason for consult: goals of care and advanced care planning discussions        Assessment/Plan:     Palliative Care  Palliative care encounter  Impression:  Tosin Ma is a 69 y.o. female with PMHx of HTN, HLD, DM2, breast cancer, and colon adenocarcinoma with mets to the liver (on OP GI mFOLFOX6 (oxaliplatin leucovorin fluorouracil) Q2W, Day 1 Cycle 2 on hold). Pt. Is known to Dr. Parham. She presented to the ED on 11/23/24 with c/o vomiting, diarrhea, generalized weakness, and inability to keep food or fluid down since 11/19/24. Patient was initially admitted to Hem/Onc services, but subsequently transferred to MICU for worsening kidney function, increased lactic acidosis, and hypotension.    Patient is currently resting in bed. Appears to be asleep. Awakens with gently touch, but falls back asleep shortly after. Patient is in the MICU, on pressors, and SLED. Patient's , Mr. Brandon Ma, is at bedside. He is amenable to Palliative Medicine.      Palliative Medicine was consulted by primary, Dr. Levine, for goals of care and advanced care planning discussions. During discussion, we called  each of patient's adult children, Brandon Alfred And Vianey, at different times. Tentative family meeting scheduled for 1pm on 11/25/24.      Advance Care Planning    Date: 11/25/2024    Today a voluntary meeting took place: bedside    Patient Participation: Patient is unable to participate     Attendees (Name and  Relationship to patient): Legal surrogate decision-maker: Mr. Brandon Ma  and daughter (Vianey), son (Donavan Alfred), sister, sister-in-law (Neela who is a nurse of 50 years), brother-in-law (Mr. Carballo), daughter-in-law, and grandson    Staff attendees (Name and  Role): FLAVIO Chacon DNP of Palliative Medicine    ACP Conversation (General): I engaged the family in a voluntary conversation about advance care planning and we specifically addressed what the goals of care would be moving forward, in light of the patient's change in clinical status, specifically patient's colon adenocarcinoma with extensive metastasis to liver, sepsis, ANDRES on SLED, and need for vasopressors.  We did specifically address the patient's likely prognosis, which appears to be poor.  We explored the patient's values and preferences for future care.       Code Status: In light of the patients advanced and life limiting illness, I engaged the family in a voluntary conversation about the patient's preferences for care  at the very end of life. The patient wishes to have a natural, peaceful death.  Along those lines, the patient does not wish to have CPR or other invasive treatments performed when her heart and/or breathing stops. I communicated to the family that a DNR order would be placed in her medical record to reflect this preference.    Goals of care: The family endorses that what is most important right now is to focus on symptom/pain control, quality of life, even if it means sacrificing a little time, and comfort and QOL     Accordingly, we have decided that the best plan to meet the patient's goals includes pivot to  "comfort-focused care      Recommendations/  Follow-up tasks: Other (specify below) will reassess patient on 11/26/24 for candidacy for HPU if needed. Family appreciative of the aggressive care they have been getting while in MICU and are amenable to staying under the care of MICU at this time.                  Life Limiting Diagnosis  Metabolic Encephalopathy  - pivot to comfort-focused care    ANDRES  - on SLED  - pivot to comfort-focused care    Sepsis  - pivot to comfort-focused care    Colon adenocarcinoma  - Metastasis to liver  - pivot to comfort-focused care        Symptom Management  Dyspnea  - morphine 4mg q15min PRN RR >30    Pain  - morphine 4mg q15min PRN pain/pain behaviors    Anxiety  - lorazepam 0.5mg q30min PRN anxiety/HR >100    Vomiting/Nausea  - ondansetron 4mg    Recommendations  - Continue management per primary team  - Pivot to comfort-focused care. Consider utilizing comfort-focused care order set.  - I will continue to follow as patient is transitioning to comfort focused care.       Thank you for consulting Palliative Medicine.          Thank you for your consult. I will follow-up with patient. Please contact us if you have any additional questions.    Subjective:     HPI:   As per H&P, "Tosin Ma is a 69 y.o. female with a  PMHx significant for HTN, HLD, DM2, breast cancer, and colon adenocarcinoma with mets to the liver (on OP GI mFOLFOX6 (oxaliplatin leucovorin fluorouracil) Q2W, Day 1 Cycle 2 on hold). Pt. Is known to Dr. Parham. She presented to the ED on 11/23/24 with c/o vomiting, diarrhea, generalized weakness, and inability to keep food or fluid down since 11/19/24. She denied fever, chills, chest pain, shortness of breath, dysuria, headache, or abd pain.      While in the ED, HDS. Labs significant for Hgb 8.7, plts 614, creatinine of 1.3 (baseline ~0.9), glucose 65, ALP 1663, bili 17.1, AST//105. CT abd/pelvis with markedly enlarged heterogeneous appearance of the " "liver consistent with biopsy-proven metastasis, mass effect of the left hepatic lobe on the gastric fundus which appears more prominent compared to prior studies, and fluid in the distal esophagus suggesting either reflux of poor esophageal motility. Was admitted initially to Hem/Onc for management.     CCM consulted on the morning of 24 for worsening renal function with oliguria (creat 2.5, CO2 9), lactic acidosis (LA >12), and encephalopathy. Transferred to MICU for trialysis line placement for CRRT per nephrology."    Palliative Medicine was consulted by primary, Dr. Levine, for goals of care and advanced care planning discussions.    Hospital Course:  No notes on file    Interval History: Patient currently in ICU. On CRRT. Requiring 0.06mcg/kg/min of levo. Patient is confused and drowsy.  is at bedside and is amenable to Palliative Medicine.    Palliative Medicine introduced to patient and patient's family.    Past Medical History:   Diagnosis Date    Breast cancer     Right    Diabetes mellitus     Goals of care, counseling/discussion 2024    Hypertension     Liver lesion 2024       Past Surgical History:   Procedure Laterality Date    BIOPSY OF LIVER WITH ULTRASOUND GUIDANCE N/A 2024    Procedure: BIOPSY, LIVER, WITH US GUIDANCE;  Surgeon: Gisell Spence MD;  Location: [facility] CATH LAB;  Service: Interventional Radiology;  Laterality: N/A;    BREAST BIOPSY Left 10/06/2017    BREAST BIOPSY Right 1996    BREAST LUMPECTOMY Right     + CA     SECTION      , ,     COLONOSCOPY N/A 2019    Procedure: COLONOSCOPY;  Surgeon: Fredrick Bailey MD;  Location: [address]);  Service: Endoscopy;  Laterality: N/A;    HYSTERECTOMY         Review of patient's allergies indicates:  No Known Allergies    Medications:  Continuous Infusions:   sodium chloride 0.9%   Intravenous Continuous 200 mL/hr at 24 1000 Rate Verify at 24 1000    " NORepinephrine bitartrate-D5W  0-3 mcg/kg/min Intravenous Continuous 14.2 mL/hr at 11/25/24 1000 0.06 mcg/kg/min at 11/25/24 1000     Scheduled Meds:   ceFEPime IV (PEDS and ADULTS)  1 g Intravenous Q12H    cyproheptadine  4 mg Oral TID    heparin (porcine)  5,000 Units Subcutaneous Q8H    lactulose  30 g Oral TID    NORepinephrine bitartrate-NaCl        ondansetron        polyethylene glycol  17 g Oral Daily    senna-docusate 8.6-50 mg  1 tablet Oral Daily     PRN Meds:  Current Facility-Administered Medications:     albuterol sulfate, 7.5 mg, Nebulization, Q4H PRN    dextrose 10%, 12.5 g, Intravenous, PRN    dextrose 10%, 25 g, Intravenous, PRN    dicyclomine, 10 mg, Oral, TID AC PRN    glucagon (human recombinant), 1 mg, Intramuscular, PRN    glucose, 16 g, Oral, PRN    glucose, 24 g, Oral, PRN    magnesium sulfate IVPB, 2 g, Intravenous, PRN    morphine, 2 mg, Intravenous, Q4H PRN    naloxone, 0.4 mg, Intravenous, PRN    NORepinephrine bitartrate-NaCl, , ,     ondansetron, , ,     ondansetron, 8 mg, Oral, Q8H PRN    oxyCODONE, 5 mg, Oral, Q6H PRN    prochlorperazine, 5 mg, Oral, TID PRN    sodium chloride 0.9%, 10 mL, Intravenous, Q12H PRN    sodium phosphate 20.01 mmol in D5W 250 mL IVPB, 20.01 mmol, Intravenous, PRN    sodium phosphate 30 mmol in D5W 250 mL IVPB, 30 mmol, Intravenous, PRN    sodium phosphate 39.99 mmol in D5W 250 mL IVPB, 39.99 mmol, Intravenous, PRN    Family History       Problem Relation (Age of Onset)    Breast cancer Sister (48), Cousin, Cousin (48), Daughter    Pancreatic cancer Mother          Tobacco Use    Smoking status: Never    Smokeless tobacco: Never   Substance and Sexual Activity    Alcohol use: No    Drug use: No    Sexual activity: Yes     Partners: Male     Birth control/protection: See Surgical Hx       Review of Systems   Unable to perform ROS: Acuity of condition     Objective:     Vital Signs (Most Recent):  Temp: 97.9 °F (36.6 °C) (11/25/24 1200)  Pulse: 89 (11/25/24  1200)  Resp: 11 (11/25/24 1200)  BP: (!) 100/56 (11/25/24 1200)  SpO2: 100 % (11/25/24 1200) Vital Signs (24h Range):  Temp:  [96.4 °F (35.8 °C)-98.2 °F (36.8 °C)] 97.9 °F (36.6 °C)  Pulse:  [] 89  Resp:  [11-21] 11  SpO2:  [95 %-100 %] 100 %  BP: ()/(51-76) 100/56     Weight: 63.3 kg (139 lb 8.8 oz)  Body mass index is 26.37 kg/m².       Physical Exam  Vitals and nursing note reviewed.   Constitutional:       Appearance: She is ill-appearing and toxic-appearing.   HENT:      Head: Normocephalic and atraumatic.      Mouth/Throat:      Mouth: Mucous membranes are dry.   Cardiovascular:      Rate and Rhythm: Normal rate.   Pulmonary:      Effort: Pulmonary effort is normal.   Skin:     General: Skin is warm and dry.   Neurological:      Mental Status: She is disoriented.      Motor: Weakness present.            Review of Symptoms      Symptom Assessment (ESAS 0-10 Scale)  Unable to complete assessment due to Acuity of condition     CAM / Delirium:  Negative  Constipation:  Negative  Diarrhea:  Positive and Negative      Bowel Management Plan (BMP):  Yes      Pain Assessment in Advanced Demential Scale (PAINAD)   Breathing - Independent of vocalization:  0  Negative vocalization:  0  Facial expression:  0  Body language:  0  Consolability:  0  Total:  0    Performance Status:  30    Living Arrangements:  Lives with spouse    Psychosocial/Cultural:   See Palliative Psychosocial Note: Yes  -  to Mr. Brandon Ma for 47 years, they have known each other for 50 years.   - 3 children. 1 child passed away. 2 living children (Vianey and Brandon Alfred)  - Vianey lives <5 miles away from patient and .  - Brandon Alfred Lives in [place]  - 1 grandson that attends [employer].  - worked for >35 years at [employer] in the "[employer] department  - enjoyed being around family, cooking, and cleaning  - described as being a sweet and loving mother and wife  **Primary  to  Follow**  Palliative Care  Consult: Yes    Spiritual:  F - Tyra and Belief:  Caodaism  I - Importance:  Typically fasts during Ramadan, but was not able to fast during the past Ramadan as she has been sick.  C - Community:  Attends [employer] regularly  A - Address in Care:  Patient does not eat pork.  Jacque support offered.  welcomes all prayers.        Advance Care Planning  Advance Directives:   Living Will: No    LaPOST: No    Do Not Resuscitate Status: Yes    Medical Power of : No      Decision Making:  Family answered questions and Patient unable to communicate due to disease severity/cognitive impairment  Goals of Care: The family endorses that what is most important right now is to focus on symptom/pain control, quality of life, even if it means sacrificing a little time, and comfort and QOL     Accordingly, we have decided that the best plan to meet the patient's goals includes pivot to comfort-focused care           Significant Labs: All pertinent labs within the past 24 hours have been reviewed.  CBC:   Recent Labs   Lab 11/25/24  0345   WBC 15.04*   HGB 8.5*   HCT 26.6*   MCV 90   *     BMP:  Recent Labs   Lab 11/25/24  0345   GLU 79  79   *  134*   K 4.2  4.2   CL 96  96   CO2 9*  9*   BUN 68*  68*   CREATININE 2.5*  2.5*   CALCIUM 8.7  8.7   MG 2.6  2.6     LFT:  Lab Results   Component Value Date     (H) 11/25/2024    ALKPHOS 1,798 (H) 11/25/2024    BILITOT 18.1 (H) 11/25/2024     Albumin:   Albumin   Date Value Ref Range Status   11/25/2024 1.5 (L) 3.5 - 5.2 g/dL Final   11/25/2024 1.5 (L) 3.5 - 5.2 g/dL Final     Protein:   Total Protein   Date Value Ref Range Status   11/25/2024 5.6 (L) 6.0 - 8.4 g/dL Final     Lactic acid:   Lab Results   Component Value Date    LACTATE >12.0 (HH) 11/25/2024    LACTATE >12.0 (HH) 11/24/2024       Significant Imaging: I have reviewed all pertinent imaging results/findings within the past 24 hours.        CT  Chest w/o Contrast 11/25/24  Impression:     1. Scattered patchy ground-glass opacities in the bilateral lung bases, nonspecific but may represent infectious process.  2. Left upper quadrant abdominal fat stranding, likely new since CT from 11/23/2024.  Recommend follow-up dedicated CT abdomen pelvis for further evaluation.  3. Bilateral pulmonary nodules, concerning for metastatic disease.  4. Hepatomegaly with multiple hypoattenuating hepatic lesions.      CT Head w/o Contrast 11/25/24  Impression:     No evidence of major vascular distribution infarct, intracranial hemorrhage, or hydrocephalus     Patchy areas of hypoattenuation the periventricular white matter which is nonspecific but favored to represent chronic microvascular ischemic disease.  MRI as clinically indicated.      In my care of this patient with acute on chronic severe illness with threat to life and/or bodily function, I am recommending goal-concordant care as noted above. I spent a significant amount of time reviewing external records/ recommendations of other providers, reviewing recent test results, and discussed care with other subspecialists involved    The above recommendations communicated directly to primary team on 11/25/24    Additional 60min time spent on a voluntary advance care planning and /or goals of care discussion, providing emotional support, formulating, and communicating prognosis and exploring burden/benefit of various approaches of treatment.         Marni Chacon NP  Palliative Medicine  [facility] - Medical ICU

## 2024-11-25 NOTE — PROGRESS NOTES
"[facility] - [facility]  Nephrology  Progress Note      Patient Name: Tosin Ma   MRN: [id number]   Current Provider: Giles Levine*  Primary Care Provider: Lake Cunningham MD   Admission Date: 11/23/2024   Hospital Day: 2  Bed: 2487/7987 A  Principal Problem: ANDRES (acute kidney injury)            REASON FOR CONSULTATION: ANDRES    HPI: Ms. Ma, a 69F with PMHx of HTN, HLD, T2DM, breast cancer, and metastatic colon adenocarcinoma (liver mets, on mFOLFOX6, Cycle 2 on hold), known to Dr. Parham, presented to [facility] ED with vomiting and diarrhea since discharge on 11/19/2024. She reports inability to tolerate food or fluids, loose stools, nausea, and weakness but denies fever, chest pain, dysuria, headache, or abdominal pain.  In the ED, BP was 115/58, pulse normal, SpO2 on RA. Labs notable for Hb 8.7, platelets 614, Cr 1.3 (baseline 0.9), glucose 65, ALP 1663, bilirubin 17.1, , . CT abdomen showed markedly enlarged liver with heterogeneous metastases, increased mass effect of the left hepatic lobe on the gastric fundus, and distal esophageal fluid suggestive of reflux or poor motility.     Nephrology consulted for Oliguric ANDRES with HAGMA, lactic acidosis.    INTERVAL HISTORY: She had SLED 4hrs last night for clearance with . She is on NE 0.04 with SBP range  with UOP of 300.  No over night events.     ALLERGY  Review of patient's allergies indicates:  No Known Allergies      OBJECTIVE     VITALS:    Vital Signs (Most Recent)  BP 94/69 (BP Location: Left arm, Patient Position: Lying)   Pulse 102   Temp 96.4 °F (35.8 °C) (Axillary)   Resp 16   Ht 5' 1" (1.549 m)   Wt 63.3 kg (139 lb 8.8 oz)   SpO2 99%   BMI 26.37 kg/m²   Vital Signs (24 Hrs Range)  Temp:  [96.4 °F (35.8 °C)-98.2 °F (36.8 °C)]   Pulse:  []   Resp:  [12-21]   BP: ()/(51-76)   SpO2:  [95 %-100 %]       INTAKE OUTPUT RECORD  I/O (24 Hrs)    Intake/Output Summary (Last 24 hours) at 11/25/2024 " "1138  Last data filed at 11/25/2024 1000  Gross per 24 hour   Intake 1841.69 ml   Output 644 ml   Net 1197.69 ml     Net Output since admission  Net IO Since Admission: 3,396.67 mL [11/25/24 1138]       PHYSICAL EXAMINATION:  HEENT: NC.   Neck: atrumatic  Heart: audible heart sounds  BL pedal edema  Lungs: CTAB anteriorly.    Access: RIJ CVC      SIGNIFICANT LABS:    Lab Results   Component Value Date    WBC 15.04 (H) 11/25/2024    HGB 8.5 (L) 11/25/2024    HCT 26.6 (L) 11/25/2024     (H) 11/25/2024    CHOL 159 11/04/2024    TRIG 172 (H) 11/04/2024    HDL 10 (L) 11/04/2024     (H) 11/25/2024     (H) 11/25/2024     (L) 11/25/2024     (L) 11/25/2024    K 4.2 11/25/2024    K 4.2 11/25/2024    CL 96 11/25/2024    CL 96 11/25/2024    CREATININE 2.5 (H) 11/25/2024    CREATININE 2.5 (H) 11/25/2024    BUN 68 (H) 11/25/2024    BUN 68 (H) 11/25/2024    CO2 9 (LL) 11/25/2024    CO2 9 (LL) 11/25/2024    INR 2.1 (H) 11/25/2024    HGBA1C 4.8 11/04/2024        Lab Results   Component Value Date    CALCIUM 8.7 11/25/2024    CALCIUM 8.7 11/25/2024    PHOS 3.4 11/25/2024    PHOS 3.4 11/25/2024        Urinalysis  No results for input(s): "COLORU", "CLARITYU", "SPECGRAV", "PHUR", "PROTEINUA", "GLUCOSEU", "BILIRUBINCON", "BLOODU", "WBCU", "RBCU", "BACTERIA", "MUCUS", "NITRITE", "LEUKOCYTESUR", "UROBILINOGEN", "HYALINECASTS" in the last 24 hours.        MEDICATIONS:     Infusion Meds:   sodium chloride 0.9%   Intravenous Continuous 200 mL/hr at 11/25/24 1000 Rate Verify at 11/25/24 1000    NORepinephrine bitartrate-D5W  0-3 mcg/kg/min Intravenous Continuous 14.2 mL/hr at 11/25/24 1000 0.06 mcg/kg/min at 11/25/24 1000       Scheduled Meds:   ceFEPime IV (PEDS and ADULTS)  1 g Intravenous Q12H    cyproheptadine  4 mg Oral TID    heparin (porcine)  5,000 Units Subcutaneous Q8H    lactulose  30 g Oral TID    NORepinephrine bitartrate-NaCl        ondansetron        polyethylene glycol  17 g Oral Daily    " senna-docusate 8.6-50 mg  1 tablet Oral Daily            ASSESSMENT AND PLAN:    Tosin Ma is a 69 y.o. female , is admitted on 11/23/2024  with past medical history of HTN, HLD, T2DM, breast cancer, and metastatic colon adenocarcinoma (liver mets, on mFOLFOX6, Cycle 2 on hold), known to Dr. Parham.    Currently patient is being managed for Vomiting and diarrhea. Nephrology  is consulted for Oliguric ANDRES with HAGMA, lactic acidosis. IV contrast on 11/23/2024.     Impression:  ANDRES Stage 2  Baseline Creatinine: 0.9  Baseline EGFR: >60  Creatinine at time of consult: 2.3  Tests done: UA (1+ protein, 2+ blood)  Etiology of ANDRES: Likely prerenal, nephrotoxic medications, IV contrast      Azotemia (BUN 68, baseline 19-23)    Acid Base Disorder: High anion gap metabolic acidosis    Electrolyte disorder : Mild hyponatremia    Anemia - Acute (Hb 8.5)      Recommendations :   - Consider suspending antihypertensives until the BP improves  - We will hold on the dialysis today    Monitor serum chemistries daily, strict intake and output Qshift , daily weights if able.  Renal protective measures: Please adjust medications for reduced clearance  Avoid nephrotoxic medications (NSAID, IV contrast)  Avoid ACEi and ARBs in the setting of ANDRES  Maintain MAP > 65    Transfuse for Hb <7        Thank you for your consult. I will follow-up with patient. Please contact us if you have any additional questions.       Serjio Schroeder MD.  Clinical Nephrology Fellow  [facility] [address]

## 2024-11-25 NOTE — SUBJECTIVE & OBJECTIVE
Interval History: Patient currently in ICU. On CRRT. Requiring 0.06mcg/kg/min of levo. Patient is confused and drowsy.  is at bedside and is amenable to Palliative Medicine.    Palliative Medicine introduced to patient and patient's family.    Past Medical History:   Diagnosis Date    Breast cancer     Right    Diabetes mellitus     Goals of care, counseling/discussion 2024    Hypertension     Liver lesion 2024       Past Surgical History:   Procedure Laterality Date    BIOPSY OF LIVER WITH ULTRASOUND GUIDANCE N/A 2024    Procedure: BIOPSY, LIVER, WITH US GUIDANCE;  Surgeon: Gisell Spence MD;  Location: [facility] CATH LAB;  Service: Interventional Radiology;  Laterality: N/A;    BREAST BIOPSY Left 10/06/2017    BREAST BIOPSY Right     BREAST LUMPECTOMY Right     + CA     SECTION      , ,     COLONOSCOPY N/A 2019    Procedure: COLONOSCOPY;  Surgeon: Fredrick Bailey MD;  Location: [facility] ([address] [place] [address]);  Service: Endoscopy;  Laterality: N/A;    HYSTERECTOMY         Review of patient's allergies indicates:  No Known Allergies    Medications:  Continuous Infusions:   sodium chloride 0.9%   Intravenous Continuous 200 mL/hr at 24 1000 Rate Verify at 24 1000    NORepinephrine bitartrate-D5W  0-3 mcg/kg/min Intravenous Continuous 14.2 mL/hr at 24 1000 0.06 mcg/kg/min at 24 1000     Scheduled Meds:   ceFEPime IV (PEDS and ADULTS)  1 g Intravenous Q12H    cyproheptadine  4 mg Oral TID    heparin (porcine)  5,000 Units Subcutaneous Q8H    lactulose  30 g Oral TID    NORepinephrine bitartrate-NaCl        ondansetron        polyethylene glycol  17 g Oral Daily    senna-docusate 8.6-50 mg  1 tablet Oral Daily     PRN Meds:  Current Facility-Administered Medications:     albuterol sulfate, 7.5 mg, Nebulization, Q4H PRN    dextrose 10%, 12.5 g, Intravenous, PRN    dextrose 10%, 25 g, Intravenous, PRN    dicyclomine, 10 mg, Oral, TID AC PRN    glucagon  (human recombinant), 1 mg, Intramuscular, PRN    glucose, 16 g, Oral, PRN    glucose, 24 g, Oral, PRN    magnesium sulfate IVPB, 2 g, Intravenous, PRN    morphine, 2 mg, Intravenous, Q4H PRN    naloxone, 0.4 mg, Intravenous, PRN    NORepinephrine bitartrate-NaCl, , ,     ondansetron, , ,     ondansetron, 8 mg, Oral, Q8H PRN    oxyCODONE, 5 mg, Oral, Q6H PRN    prochlorperazine, 5 mg, Oral, TID PRN    sodium chloride 0.9%, 10 mL, Intravenous, Q12H PRN    sodium phosphate 20.01 mmol in D5W 250 mL IVPB, 20.01 mmol, Intravenous, PRN    sodium phosphate 30 mmol in D5W 250 mL IVPB, 30 mmol, Intravenous, PRN    sodium phosphate 39.99 mmol in D5W 250 mL IVPB, 39.99 mmol, Intravenous, PRN    Family History       Problem Relation (Age of Onset)    Breast cancer Sister (48), Cousin, Cousin (48), Daughter    Pancreatic cancer Mother          Tobacco Use    Smoking status: Never    Smokeless tobacco: Never   Substance and Sexual Activity    Alcohol use: No    Drug use: No    Sexual activity: Yes     Partners: Male     Birth control/protection: See Surgical Hx       Review of Systems   Unable to perform ROS: Acuity of condition     Objective:     Vital Signs (Most Recent):  Temp: 97.9 °F (36.6 °C) (11/25/24 1200)  Pulse: 89 (11/25/24 1200)  Resp: 11 (11/25/24 1200)  BP: (!) 100/56 (11/25/24 1200)  SpO2: 100 % (11/25/24 1200) Vital Signs (24h Range):  Temp:  [96.4 °F (35.8 °C)-98.2 °F (36.8 °C)] 97.9 °F (36.6 °C)  Pulse:  [] 89  Resp:  [11-21] 11  SpO2:  [95 %-100 %] 100 %  BP: ()/(51-76) 100/56     Weight: 63.3 kg (139 lb 8.8 oz)  Body mass index is 26.37 kg/m².       Physical Exam  Vitals and nursing note reviewed.   Constitutional:       Appearance: She is ill-appearing and toxic-appearing.   HENT:      Head: Normocephalic and atraumatic.      Mouth/Throat:      Mouth: Mucous membranes are dry.   Cardiovascular:      Rate and Rhythm: Normal rate.   Pulmonary:      Effort: Pulmonary effort is normal.   Skin:      General: Skin is warm and dry.   Neurological:      Mental Status: She is disoriented.      Motor: Weakness present.            Review of Symptoms      Symptom Assessment (ESAS 0-10 Scale)  Unable to complete assessment due to Acuity of condition     CAM / Delirium:  Negative  Constipation:  Negative  Diarrhea:  Positive and Negative      Bowel Management Plan (BMP):  Yes      Pain Assessment in Advanced Demential Scale (PAINAD)   Breathing - Independent of vocalization:  0  Negative vocalization:  0  Facial expression:  0  Body language:  0  Consolability:  0  Total:  0    Performance Status:  30    Living Arrangements:  Lives with spouse    Psychosocial/Cultural:   See Palliative Psychosocial Note: Yes  -  to Mr. Brandon Ma for 47 years, they have known each other for 50 years.   - 3 children. 1 child passed away. 2 living children (Vianey and Brandon Alfred)  - Vianey lives <5 miles away from patient and .  - Brandon Alfred Lives in [place]  - 1 grandson that attends [employer].  - worked for >35 years at [employer] in the [employer] department  - enjoyed being around family, cooking, and cleaning  - described as being a sweet and loving mother and wife  **Primary  to Follow**  Palliative Care  Consult: Yes    Spiritual:  F - Tyra and Belief:  Bahai  I - Importance:  Typically fasts during Ramadan, but was not able to fast during the past Ramadan as she has been sick.  C - Community:  Attends [employer] regularly  A - Address in Care:  Patient does not eat pork.  [place] support offered.  welcomes all prayers.        Advance Care Planning   Advance Directives:   Living Will: No    LaPOST: No    Do Not Resuscitate Status: Yes    Medical Power of : No      Decision Making:  Family answered questions and Patient unable to communicate due to disease severity/cognitive impairment  Goals of Care: The family endorses that what is most important right now is to  focus on symptom/pain control, quality of life, even if it means sacrificing a little time, and comfort and QOL     Accordingly, we have decided that the best plan to meet the patient's goals includes pivot to comfort-focused care           Significant Labs: All pertinent labs within the past 24 hours have been reviewed.  CBC:   Recent Labs   Lab 11/25/24  0345   WBC 15.04*   HGB 8.5*   HCT 26.6*   MCV 90   *     BMP:  Recent Labs   Lab 11/25/24  0345   GLU 79  79   *  134*   K 4.2  4.2   CL 96  96   CO2 9*  9*   BUN 68*  68*   CREATININE 2.5*  2.5*   CALCIUM 8.7  8.7   MG 2.6  2.6     LFT:  Lab Results   Component Value Date     (H) 11/25/2024    ALKPHOS 1,798 (H) 11/25/2024    BILITOT 18.1 (H) 11/25/2024     Albumin:   Albumin   Date Value Ref Range Status   11/25/2024 1.5 (L) 3.5 - 5.2 g/dL Final   11/25/2024 1.5 (L) 3.5 - 5.2 g/dL Final     Protein:   Total Protein   Date Value Ref Range Status   11/25/2024 5.6 (L) 6.0 - 8.4 g/dL Final     Lactic acid:   Lab Results   Component Value Date    LACTATE >12.0 (HH) 11/25/2024    LACTATE >12.0 (HH) 11/24/2024       Significant Imaging: I have reviewed all pertinent imaging results/findings within the past 24 hours.        CT Chest w/o Contrast 11/25/24  Impression:     1. Scattered patchy ground-glass opacities in the bilateral lung bases, nonspecific but may represent infectious process.  2. Left upper quadrant abdominal fat stranding, likely new since CT from 11/23/2024.  Recommend follow-up dedicated CT abdomen pelvis for further evaluation.  3. Bilateral pulmonary nodules, concerning for metastatic disease.  4. Hepatomegaly with multiple hypoattenuating hepatic lesions.      CT Head w/o Contrast 11/25/24  Impression:     No evidence of major vascular distribution infarct, intracranial hemorrhage, or hydrocephalus     Patchy areas of hypoattenuation the periventricular white matter which is nonspecific but favored to represent chronic  microvascular ischemic disease.  MRI as clinically indicated.

## 2024-11-25 NOTE — NURSING
Nurse giving handoff: Nallely Torres RN   Nurse receiving handoff:Jeison Cortes RN    Patient transferred to [facility] via [facility] and primary care nurse (Nallely Torres, RN). Bedside report given to Jeison Cortes RN. Repeat CMP was pending. Lactic >12. Ammonia 120. Nephrology consulted. Planning for dialysis. [place] is following patient at this time.

## 2024-11-25 NOTE — CONSULTS
[facility] - Medical ICU  Nephrology  Consult Note    Patient Name: Tosin Ma  MRN: [id number]  Admission Date: 2024  Hospital Length of Stay: 2 days  Attending Provider: Giles Levine*   Primary Care Physician: Lake Cunningham MD  Principal Problem:Nausea & vomiting    Inpatient consult to Nephrology  Consult performed by: Margaret Amaya MD  Consult ordered by: Christos Mera MD  Reason for consult: ANDRES, Met Acidosis        Subjective:     HPI: Ms. Ma, a 69F with PMHx of HTN, HLD, T2DM, breast cancer, and metastatic colon adenocarcinoma (liver mets, on mFOLFOX6, Cycle 2 on hold), known to Dr. Parham, presented to [facility] ED with vomiting and diarrhea since discharge on 2024. She reports inability to tolerate food or fluids, loose stools, nausea, and weakness but denies fever, chest pain, dysuria, headache, or abdominal pain.  In the ED, BP was 115/58, pulse normal, SpO2 on RA. Labs notable for Hb 8.7, platelets 614, Cr 1.3 (baseline 0.9), glucose 65, ALP 1663, bilirubin 17.1, , . CT abdomen showed markedly enlarged liver with heterogeneous metastases, increased mass effect of the left hepatic lobe on the gastric fundus, and distal esophageal fluid suggestive of reflux or poor motility.    Nephrology consulted for Oliguric ANDRES with HAGMA, lactic acidosis.    Past Medical History:   Diagnosis Date    Breast cancer     Right    Diabetes mellitus     Hypertension     Liver lesion 2024       Past Surgical History:   Procedure Laterality Date    BIOPSY OF LIVER WITH ULTRASOUND GUIDANCE N/A 2024    Procedure: BIOPSY, LIVER, WITH US GUIDANCE;  Surgeon: Gisell Spence MD;  Location: [facility] CATH LAB;  Service: Interventional Radiology;  Laterality: N/A;    BREAST BIOPSY Left 10/06/2017    BREAST BIOPSY Right     BREAST LUMPECTOMY Right     + CA     SECTION      , ,     COLONOSCOPY N/A 2019    Procedure: COLONOSCOPY;  Surgeon: Fredrick  SATYA Bailey MD;  Location: [facility] ([address] [place] [address]);  Service: Endoscopy;  Laterality: N/A;    HYSTERECTOMY         Review of patient's allergies indicates:  No Known Allergies  Current Facility-Administered Medications   Medication Frequency    albuterol sulfate nebulizer solution 7.5 mg Q4H PRN    calcium gluconate 100 mg/mL (10%) injection (apheresis) 1,000 mg Once    ceFEPIme injection 2 g Q24H    cyproheptadine 4 mg tablet 4 mg TID    dextrose 10% bolus 125 mL 125 mL PRN    dextrose 10% bolus 125 mL 125 mL PRN    dextrose 10% bolus 250 mL 250 mL PRN    dextrose 10% bolus 250 mL 250 mL PRN    dextrose 5 % and 0.45 % NaCl infusion Continuous    dicyclomine capsule 10 mg TID AC PRN    glucagon (human recombinant) injection 1 mg PRN    glucose chewable tablet 16 g PRN    glucose chewable tablet 24 g PRN    heparin (porcine) injection 5,000 Units Q8H    hydroCHLOROthiazide tablet 25 mg Daily    insulin aspart U-100 pen 0-5 Units QID (AC + HS) PRN    insulin regular injection 10 Units 0.1 mL Once    lactulose 20 gram/30 mL solution Soln 30 g TID    morphine injection 2 mg Q4H PRN    naloxone 0.4 mg/mL injection 0.4 mg PRN    NIFEdipine 24 hr tablet 60 mg Daily PM    ondansetron tablet 8 mg Q8H PRN    oxyCODONE immediate release tablet 5 mg Q6H PRN    polyethylene glycol packet 17 g Daily    prochlorperazine tablet 5 mg TID PRN    senna-docusate 8.6-50 mg per tablet 1 tablet Daily    sodium bicarbonate solution 100 mEq Once    sodium chloride 0.9% bolus 3,000 mL 3,000 mL Once    sodium chloride 0.9% flush 10 mL Q12H PRN     Family History       Problem Relation (Age of Onset)    Breast cancer Sister (48), Cousin, Cousin (48), Daughter    Pancreatic cancer Mother          Tobacco Use    Smoking status: Never    Smokeless tobacco: Never   Substance and Sexual Activity    Alcohol use: No    Drug use: No    Sexual activity: Yes     Partners: Male     Birth control/protection: See Surgical Hx     Review of Systems    Constitutional:  Positive for fatigue.   Respiratory: Negative.     Cardiovascular: Negative.    Endocrine: Negative.    Genitourinary: Negative.    Neurological: Negative.      Objective:     Vital Signs (Most Recent):  Temp: 97.8 °F (36.6 °C) (11/24/24 2230)  Pulse: 103 (11/24/24 2329)  Resp: 16 (11/24/24 2230)  BP: 114/63 (11/24/24 2230)  SpO2: 98 % (11/24/24 2230) Vital Signs (24h Range):  Temp:  [97.3 °F (36.3 °C)-98.2 °F (36.8 °C)] 97.8 °F (36.6 °C)  Pulse:  [] 103  Resp:  [16-18] 16  SpO2:  [95 %-100 %] 98 %  BP: (100-115)/(63-76) 114/63     Weight: 63.3 kg (139 lb 8.8 oz) (11/23/24 2326)  Body mass index is 26.37 kg/m².  Body surface area is 1.65 meters squared.    I/O last 3 completed shifts:  In: 2999 [P.O.:50; I.V.:582.6; IV Piggyback:2366.4]  Out: 750 [Urine:750]     Physical Exam  Constitutional:       Appearance: She is ill-appearing.   Pulmonary:      Effort: Pulmonary effort is normal. No respiratory distress.   Musculoskeletal:      Right lower leg: No edema.      Left lower leg: No edema.   Skin:     General: Skin is warm.   Neurological:      Mental Status: She is alert.          Significant Labs:  ABGs:   Recent Labs   Lab 11/24/24  2357   PH 7.210*   PCO2 29.2*   HCO3 11.7*   POCSATURATED 91   BE -16*     BMP:   Recent Labs   Lab 11/24/24  0509 11/24/24 2045 11/24/24  2343   GLU 58*   < > 116*      < > 132*   K 3.8   < > 4.1   CL 98   < > 95   CO2 10*   < > 8*   BUN 56*   < > 61*   CREATININE 1.6*   < > 2.3*   CALCIUM 9.2   < > 8.9   MG 2.7*  --   --     < > = values in this interval not displayed.     CBC:   Recent Labs   Lab 11/24/24 2045 11/24/24  2219 11/24/24  2357   WBC 14.63*  --   --    RBC 2.97*  --   --    HGB 8.4*  --   --    HCT 26.8*   < > 52   *  --   --    MCV 90  --   --    MCH 28.3  --   --    MCHC 31.3*  --   --     < > = values in this interval not displayed.       Significant Imaging:  Labs: Reviewed  Assessment/Plan:     Renal/  Metabolic  acidosis  See ANDRES    ANDRES (acute kidney injury)  Baseline Scr 0.9, at consult 2.3  K on Istat at consult >9, HCO3 11, PH 7.2, Lactate > 12  Repeat iSTAT K 5.5  Oliguria, rising BUN  Receiving Chemotherapy  Advanced colonic Ca with Liver Mets    Plan/Recommendations;  -Consult Critical Care for Trialysis Line placement  -Will start her on SLED 4 hours (Consent placed in Chart), orders in, dialysis nurse notified  =Strict I/Os        Thank you for your consult. I will follow-up with patient. Please contact us if you have any additional questions.    Margaret Amaya MD  Nephrology  [facility] - Medical ICU

## 2024-11-25 NOTE — ASSESSMENT & PLAN NOTE
Creatinine 1.3 on admit; baseline ~0.9. Up to 2.5 on the morning of 11/25/24. Remains oliguric. Upgraded to MICU for trialysis line placement and initiation of CRRT.    - Nephrology consulted and following; appreciate recs  - Trialysis line to R. IJ  - SLED x4 hours  - Strict I/Os  - Avoid nephrotoxic agents  - Renally dose medications  - Maintain MAP >65

## 2024-11-25 NOTE — PLAN OF CARE
Pt A&Ox4 but she is slow to respond to respond to voice and is very lethargic. The pt will follow commands but it will take her a minute or 2 to properly respond to the command. She has difficulty when holdign up her arms for longer than a few seconds. Her vitals are stable except for her heart rate, she is slighlt tachycardic at about 105. The pt is afebrile.    The pt is also heavily jaundiced. MD Mera informed of this and mathew saw the pt and are currently assisting with her plan of care. An ammonia level was drawn from the pt and it was 120 umol/L. Lactulose was ordered but for 0900.    As of this time the pt is resting in no pain and new ABGs are beign drawn on her. The pt is currently resting, tp care ongoing.

## 2024-11-25 NOTE — PROCEDURES
"Tosin Ma is a 69 y.o. female patient.    Temp: 97.8 °F (36.6 °C) (11/25/24 0302)  Pulse: 103 (11/25/24 0400)  Resp: 12 (11/25/24 0400)  BP: (!) 94/55 (11/25/24 0400)  SpO2: 100 % (11/25/24 0400)  Weight: 63.3 kg (139 lb 8.8 oz) (11/23/24 2326)  Height: 5' 1" (154.9 cm) (11/23/24 2326)       Central Line    Date/Time: 11/25/2024 4:25 AM    Performed by: Mami Hernandez AGACNP-BC  Authorized by: Mami Hernandez AGACNP-BC    Location procedure was performed:  [facility] CRITICAL CARE MEDICINE  Pre-operative diagnosis:  ANDRES, lactic acidosis  Post-operative diagnosis:  ANDRES, lactic acidosis  Consent Done ?:  Yes  Time out complete?: Verified correct patient, procedure, equipment, staff, and site/side    Indications:  Med administration, hemodialysis and vascular access  Anesthesia:  Local infiltration  Local anesthetic:  Lidocaine 1% without epinephrine  Anesthetic total (ml):  5  Preparation:  Skin prepped with ChloraPrep  Skin prep agent dried: Skin prep agent completely dried prior to procedure    Sterile barriers: All five maximal sterile barriers used - gloves, gown, cap, mask and large sterile sheet    Hand hygiene: Hand hygiene performed immediately prior to central venous catheter insertion    Location:  Right internal jugular  Catheter size:  13 Fr  Ultrasound guidance: Yes    Vessel Caliber:  Large   patent  Comprressibility:  Normal  Needle advanced into vessel with real time ultrasound guidance.    Guidewire confirmed in vessel.    Steril sheath on probe.    Sterile gel used.  Manometry: Yes    Number of attempts:  1  Securement:  Line sutured, chlorhexidine patch, sterile dressing applied and blood return through all ports  Complications: No    Estimated blood loss (mL):  3  Specimens: No    Implants: No    Guidewire: guidewire removed intact, verified with nurse    XRay:  Placement verified by x-ray, no pneumothorax on x-ray, successful placement and tip termination  Adverse Events:  None    Mami" [facility]  Pulmonary Critical Care  11/25/2024

## 2024-11-25 NOTE — ASSESSMENT & PLAN NOTE
This patient does have evidence of infective focus  My overall impression is sepsis.  Source: Respiratory, Urinary Tract, and Unknown  Antibiotics given-   Antibiotics (72h ago, onward)      Start     Stop Route Frequency Ordered    11/25/24 0100  ceFEPIme injection 2 g         -- IV Every 24 hours (non-standard times) 11/25/24 0030          Latest lactate reviewed-  Recent Labs   Lab 11/23/24  1407 11/24/24  2227   LACTATE  --  >12.0*   POCLAC 13.75*  --      Organ dysfunction indicated by Acute kidney injury, Acute liver injury, and Encephalopathy    Will Not start vasopressors.  Source control achieved by:     WBC 15.04. LA >12. No fevers. Covered with BSA by primary team prior to transfer to MICU. UA without infection. Chest xray without masses, consolidations, effusions, or pneumothorax.    - Trend lactic acid q6h to peak  - Continue Cefepime for now; de-escalate as appropriate  - Daily CBC  - Follow up infectious w/u; BC NGTD  - Follow up RIP  - Follow up CT chest

## 2024-11-25 NOTE — H&P
[facility] - Medical ICU  Critical Care Medicine  History & Physical    Patient Name: Tosin Ma  MRN: [id number]  Admission Date: 11/23/2024  Hospital Length of Stay: 2 days  Code Status: Full Code  Attending Physician: Giles Levine*   Primary Care Provider: Lake Cunningham MD   Principal Problem: Nausea & vomiting    Subjective:     HPI:  Ms. Ma is an extremely pleasant 69yoF with PMHx significant for HTN, HLD, DM2, breast cancer, and colon adenocarcinoma with mets to the liver (on OP GI [id number] (oxaliplatin leucovorin fluorouracil) Q2W, Day 1 Cycle 2 on hold). Pt. Is known to Dr. Parham. She presented to the ED on 11/23/24 with c/o vomiting, diarrhea, generalized weakness, and inability to keep food or fluid down since 11/19/24. She denied fever, chills, chest pain, shortness of breath, dysuria, headache, or abd pain.     While in the ED, HDS. Labs significant for Hgb 8.7, plts 614, creatinine of 1.3 (baseline ~0.9), glucose 65, ALP 1663, bili 17.1, AST//105. CT abd/pelvis with markedly enlarged heterogeneous appearance of the liver consistent with biopsy-proven metastasis, mass effect of the left hepatic lobe on the gastric fundus which appears more prominent compared to prior studies, and fluid in the distal esophagus suggesting either reflux of poor esophageal motility. Was admitted initially to Hem/Onc for management.    CCM consulted on the morning of 11/25/24 for worsening renal function with oliguria (creat 2.5, CO2 9), lactic acidosis (LA >12), and encephalopathy. Transferred to MICU for trialysis line placement for CRRT per nephrology.     Hospital/ICU Course:  No notes on file     Past Medical History:   Diagnosis Date    Breast cancer 1996    Right    Diabetes mellitus     Hypertension     Liver lesion 11/5/2024       Past Surgical History:   Procedure Laterality Date    BIOPSY OF LIVER WITH ULTRASOUND GUIDANCE N/A 11/5/2024    Procedure: BIOPSY, LIVER, WITH US GUIDANCE;   Surgeon: Gisell Spence MD;  Location: [facility] CATH LAB;  Service: Interventional Radiology;  Laterality: N/A;    BREAST BIOPSY Left 10/06/2017    BREAST BIOPSY Right 1996    BREAST LUMPECTOMY Right 1996    + CA     SECTION      , ,     COLONOSCOPY N/A 2019    Procedure: COLONOSCOPY;  Surgeon: Fredrick Bailey MD;  Location: [address]);  Service: Endoscopy;  Laterality: N/A;    HYSTERECTOMY         Review of patient's allergies indicates:  No Known Allergies    Family History       Problem Relation (Age of Onset)    Breast cancer Sister (48), Cousin, Cousin (48), Daughter    Pancreatic cancer Mother          Tobacco Use    Smoking status: Never    Smokeless tobacco: Never   Substance and Sexual Activity    Alcohol use: No    Drug use: No    Sexual activity: Yes     Partners: Male     Birth control/protection: See Surgical Hx      Review of Systems   Unable to perform ROS: Acuity of condition     Objective:     Vital Signs (Most Recent):  Temp: 97.8 °F (36.6 °C) (24 0302)  Pulse: 103 (24 0400)  Resp: 12 (24 0400)  BP: (!) 94/55 (24 0400)  SpO2: 100 % (24 0400) Vital Signs (24h Range):  Temp:  [97.3 °F (36.3 °C)-98.2 °F (36.8 °C)] 97.8 °F (36.6 °C)  Pulse:  [] 103  Resp:  [12-21] 12  SpO2:  [95 %-100 %] 100 %  BP: ()/(51-76) 94/55   Weight: 63.3 kg (139 lb 8.8 oz)  Body mass index is 26.37 kg/m².      Intake/Output Summary (Last 24 hours) at 2024 0441  Last data filed at 2024 2217  Gross per 24 hour   Intake 1085.95 ml   Output 300 ml   Net 785.95 ml          Physical Exam  Vitals and nursing note reviewed.   Constitutional:       Appearance: She is ill-appearing.   HENT:      Mouth/Throat:      Mouth: Mucous membranes are dry.      Pharynx: Oropharynx is clear.   Eyes:      General: Scleral icterus present.      Extraocular Movements: Extraocular movements intact.      Pupils: Pupils are equal, round, and reactive to light.       Comments: 3+ bilaterally   Neck:      Comments: R. IJ trialysis  Cardiovascular:      Rate and Rhythm: Regular rhythm. Tachycardia present.      Pulses: Normal pulses.      Heart sounds: Normal heart sounds.      Comments: ST  Pulmonary:      Effort: Pulmonary effort is normal. Tachypnea present.      Breath sounds: Normal breath sounds.   Abdominal:      General: There is distension.      Palpations: Abdomen is soft.   Genitourinary:     Comments: External walker  Musculoskeletal:         General: Normal range of motion.   Skin:     General: Skin is warm and dry.      Capillary Refill: Capillary refill takes less than 2 seconds.      Findings: Bruising present.   Neurological:      General: No focal deficit present.      Mental Status: She is easily aroused. She is lethargic and disoriented.      GCS: GCS eye subscore is 3. GCS verbal subscore is 4. GCS motor subscore is 6.   Psychiatric:         Behavior: Behavior is cooperative.          Vents:     Lines/Drains/Airways       Central Venous Catheter Line  Duration             Trialysis (Dialysis) Catheter 11/25/24 0218 right internal jugular <1 day              Drain  Duration             Female External Urinary Catheter w/ Suction 11/23/24 1407 1 day              Peripheral Intravenous Line  Duration                  Peripheral IV - Single Lumen 11/23/24 1100 Anterior;Proximal;Right Forearm 1 day                  Significant Labs:    CBC/Anemia Profile:  Recent Labs   Lab 11/24/24  0509 11/24/24  2045 11/24/24  2219 11/24/24  2357 11/25/24  0345   WBC 13.07* 14.63*  --   --  15.04*   HGB 8.3* 8.4*  --   --  8.5*   HCT 26.8* 26.8* 30* 52 26.6*   * 577*  --   --  576*   MCV 92 90  --   --  90   RDW 18.4* 18.0*  --   --  18.2*        Chemistries:  Recent Labs   Lab 11/24/24  0509 11/24/24  2045 11/24/24  2343 11/25/24  0345    134* 132* 134*  134*   K 3.8 4.1 4.1 4.2  4.2   CL 98 95 95 96  96   CO2 10* 9* 8* 9*  9*   BUN 56* 65* 61* 68*  68*  "  CREATININE 1.6* 2.3* 2.3* 2.5*  2.5*   CALCIUM 9.2 8.9 8.9 8.7  8.7   ALBUMIN 1.6* 1.5*  --  1.5*  1.5*   PROT 5.8* 5.6*  --  5.6*   BILITOT 17.3* 16.5*  --  18.1*   ALKPHOS 1,595* 1,719*  --  1,798*   ALT 99* 109*  --  116*   * 183*  --  220*   MG 2.7*  --   --  2.6  2.6   PHOS 2.8  --   --  3.4  3.4       All pertinent labs within the past 24 hours have been reviewed.    Significant Imaging: I have reviewed all pertinent imaging results/findings within the past 24 hours.  Assessment/Plan:     Neuro  Encephalopathy, metabolic  Somnolent upon exam. Awakens briefly to loud verbal/noxious stimuli but quickly falls back to sleep. Suspect likely s/t hyperammonemia (120) and/or infection. No unilateral weakness or facial droop appreciated upon exam. POC CB.    - CTH without acute abnormalities; "patchy areas of hypoattenuation the periventricular white matter which is nonspecific but favored to represent chronic microvascular ischemic disease"  - MRI recommended to correlate if remains encephalopathic   - Delirium precautions  - Fall precautions    Cardiac/Vascular  Essential hypertension  Takes hydrochlorothiazide and nifedipine at home.    - Continue home medications  - Inpatient SBP goal <180    Renal/  Metabolic acidosis  Increasing creatinine and oliguria along with LA >12. VBG 7.210/29.2/72/11.7/-16. Bicarb 9. Likely s/t cancer with mets. Upgraded to MICU for SLED initiation.    - Nephrology consulted; appreciate assistance  - Trend LA q6h to peak/clearance  - PRN VBG/ABG  - Daily CMP    ANDRES (acute kidney injury)  Creatinine 1.3 on admit; baseline ~0.9. Up to 2.5 on the morning of 24. Remains oliguric. Upgraded to MICU for trialysis line placement and initiation of CRRT.    - Nephrology consulted and following; appreciate recs  - Trialysis line to R. IJ  - SLED x4 hours  - Strict I/Os  - Avoid nephrotoxic agents  - Renally dose medications  - Maintain MAP >65    ID  Sepsis with acute " "organ dysfunction  This patient does have evidence of infective focus  My overall impression is sepsis.  Source: Respiratory, Urinary Tract, and Unknown  Antibiotics given-   Antibiotics (72h ago, onward)      Start     Stop Route Frequency Ordered    11/25/24 0100  ceFEPIme injection 2 g         -- IV Every 24 hours (non-standard times) 11/25/24 0030          Latest lactate reviewed-  Recent Labs   Lab 11/23/24  1407 11/24/24  2227   LACTATE  --  >12.0*   POCLAC 13.75*  --      Organ dysfunction indicated by Acute kidney injury, Acute liver injury, and Encephalopathy    Will Not start vasopressors.  Source control achieved by:     WBC 15.04. LA >12. No fevers. Covered with BSA by primary team prior to transfer to MICU. UA without infection. Chest xray without masses, consolidations, effusions, or pneumothorax.    - Trend lactic acid q6h to peak  - Continue Cefepime for now; de-escalate as appropriate  - Daily CBC  - Follow up infectious w/u; BC NGTD  - Follow up RIP  - Follow up CT chest    Oncology  Colon adenocarcinoma  Chemo on hold due to rapidly increasing bili per Med Onc.    - Monitor LFTs  - Monitor bili    Metastasis to liver  Hx of breast cancer, colon adenocarcinoma, and recent metastases to the liver. CT from 11/4/24 consistent with "hepatomegaly with multiple hypo-attenuating hepatic masses consistent with metastatic disease".     On admission, ALP 1549, bili 10.0 with associated scleral icterus. Hepatic metastatic lesions likely etiology causing obstruction and right-sided abd pain along with n/v. Was started on FOLFOX on 11/16 per Onc in hopes of reducing size of hepatic lesions.     Overnight on 11/24/25, pt. With lactic acidosis >12 and ammonia 120.    - Completed chemo 11/18  - Antiemetics PRN for nausea/vomiting  - Oxycodone PRN for moderate pain  - Morphine PRN for severe pain  - Med/Onc following; appreciate assistance  - Daily CMP; trend LFTs  - Continue lactulose    History of breast " cancer  Hx of breast cancer in 1996 with right lumpectomy.    Endocrine  Type 2 diabetes mellitus with hyperglycemia, without long-term current use of insulin  Hx of DM2. Not on home insulin. A1C 4.8 on 11/4/24.    - Inpatient goal 140-180  - LD SSI  - Hypoglycemia protocol        GI  * Nausea & vomiting  Likely s/t chemotherapy.    - Zofran PRN  - Compazine PRN  - Daily EKG to monitor QTc    Diarrhea  Likely s/t chemotherapy.    - Continue to monitor    Serum total bilirubin elevated  Bilirubin 18.1. Likely s/t liver mets.    - Daily CMP; monitor LFTs    Palliative Care  Goals of care, counseling/discussion  Advance Care Planning     Spoke with  (Dylan Ma) briefly at bedside about transfer to the MICU for worsening ANDRES and lactic acidosis requiring trialysis line placement and SLED initiation. Relayed concerns and worries regarding patient's poor prognosis s/t cancers with metastases and new dialysis requirement.  appears understanding of patient's critical illness; appropriately upset. Will remain full code at this time and initiate CRRT in hopes of improving kidney function.    - Recommend palliative consult for ongoing [facility]          Critical Care Daily Checklist:    A: Awake: RASS Goal/Actual Goal:    Actual:     B: Spontaneous Breathing Trial Performed?     C: SAT & SBT Coordinated?  N/A                      D: Delirium: CAM-ICU     E: Early Mobility Performed? No   F: Feeding Goal:    Status:     Current Diet Order   Procedures    Diet NPO      AS: Analgesia/Sedation PRNs   T: Thromboembolic Prophylaxis Heparin SQ   H: HOB > 300 Yes   U: Stress Ulcer Prophylaxis (if needed) N/A   G: Glucose Control 140-180, LD SSI   B: Bowel Function     I: Indwelling Catheter (Lines & Gaspar) Necessity YO Winn trialysis line   D: De-escalation of Antimicrobials/Pharmacotherapies Continue Cefepime for now; de-escalate as appropriate    Plan for the day/ETD Upgrade to MICU from Med/Onc for trialysis  placement and initiation of CRRT    Code Status:  Family/Goals of Care: Full Code  Ongoing GOC with .     Critical Care Time: 70 minutes  Critical secondary to Worsening ANDRES requiring CRRT initiation, encephalopathy, lactic acidosis.    Discussed case with CCM attending. Full attestation to follow.    Critical care was time spent personally by me on the following activities: development of treatment plan with patient or surrogate and bedside caregivers, discussions with consultants, evaluation of patient's response to treatment, examination of patient, ordering and performing treatments and interventions, ordering and review of laboratory studies, ordering and review of radiographic studies, pulse oximetry, re-evaluation of patient's condition. This critical care time did not overlap with that of any other provider or involve time for any procedures.     OBDULIO Thomas-BC  Critical Care Medicine  [facility] - Medical ICU

## 2024-11-25 NOTE — ASSESSMENT & PLAN NOTE
Increasing creatinine and oliguria along with LA >12. VBG 7.210/29.2/72/11.7/-16. Bicarb 9. Likely s/t cancer with mets. Upgraded to MICU for SLED initiation.    - Nephrology consulted; appreciate assistance  - Trend LA q6h to peak/clearance  - PRN VBG/ABG  - Daily CMP

## 2024-11-25 NOTE — AI DETERIORATION ALERT
RAPID RESPONSE NURSE NOTE        Admit Date: 2024  LOS: 2  Code Status: Full Code   Date of Consult: 2024  : 1955  Age: 69 y.o.  Weight:   Wt Readings from Last 1 Encounters:   24 63.3 kg (139 lb 8.8 oz)     Sex: female  Race: Black or    Bed: [address] [place] [address]  MRN: [id number]  Time Rapid Response Team page Received:   Time Rapid Response Team at Bedside:   Time Rapid Response Team left Bedside: 0045  Was the patient discharged from an ICU this admission? No   Was the patient discharged from a PACU within last 24 hours? No   Did the patient receive conscious sedation/general anesthesia in last 24 hours? No  Was the patient in the ED within the past 24 hours? No  Was the patient on NIPPV within the past 24 hours? No   Did this progress into an ARC or CPA: No  Attending Physician: Giles Levine*  Primary Service: [facility] MEDICAL ONCOLOGY       SITUATION    Notified by charge RN via phone call.  Reason for alert: altered mental status, acute lab abnormalities   Called to evaluate the patient for Neuro, circulatory.    BACKGROUND     Why is the patient in the hospital?: Nausea & vomiting    Patient has a past medical history of Breast cancer, Diabetes mellitus, Hypertension, and Liver lesion.    Last Vitals:  Temp: 97.8 °F (36.6 °C) (2230)  Pulse: 103 (2329)  Resp: 16 (2230)  BP: 114/63 (2230)  SpO2: 98 % (2230)    24 Hours Vitals Range:  Temp:  [97.3 °F (36.3 °C)-98.2 °F (36.8 °C)]   Pulse:  []   Resp:  [16-18]   BP: (100-115)/(63-76)   SpO2:  [95 %-100 %]     Labs:  Recent Labs     24  1210 24  0509 24  2357   WBC 11.35 13.07* 14.63*  --   --    HGB 8.7* 8.3* 8.4*  --   --    HCT 28.5* 26.8* 26.8* 30* 52   * 585* 577*  --   --        Recent Labs     24  0509 24  2045 24  2343    134* 132*   K 3.8 4.1 4.1   CL 98 95 95   CO2 10* 9* 8*   BUN 56* 65* 61*    CREATININE 1.6* 2.3* 2.3*   GLU 58* 143* 116*   PHOS 2.8  --   --    MG 2.7*  --   --         Recent Labs     24  1340 24  2219 24  2357   PH 7.191* 7.213* 7.210*   PCO2 32.7* 27.8* 29.2*   PO2 36* 60 72*   HCO3 12.5* 11.2* 11.7*   POCSATURATED 56 86 91   BE -16* -17* -16*        ASSESSMENT     Physical Exam  Constitutional:       Appearance: She is ill-appearing.   Eyes:      General: Scleral icterus present.   Cardiovascular:      Rate and Rhythm: Regular rhythm. Tachycardia present.   Pulmonary:      Effort: Pulmonary effort is normal.      Breath sounds: Decreased breath sounds present.   Abdominal:      Palpations: Abdomen is soft.      Tenderness: There is abdominal tenderness in the left lower quadrant.   Musculoskeletal:      Right lower le+ Edema present.      Left lower le+ Edema present.   Skin:     General: Skin is warm and dry.      Coloration: Skin is jaundiced.      Findings: Bruising present.   Neurological:      General: No focal deficit present.      Mental Status: She is lethargic and disoriented.       /63 (82)  RR 16, SpO2 98% on room air  Temp 97.8      Notified by charge RN that patient has been increasingly lethargic tonight with a serum CO2 of 9    Upon arrival to bedside patient opens eyes to voice. Confused to time, situation. Denies any current complaints. No nausea, shortness of breath, or pain. Abdominal tenderness to palpation in LLQ.     Decreased urine output today, has not urinated at all tonight. Bladder scan performed with ~150cc noted in bladder. Per chart review is net fluid positive over 3.2L since admit, with worsening kidney function noted in her lab work. Ammonia drawn tonight 120    INTERVENTIONS    The patient was seen for Neurological problem. Staff concerns included mental status change. The following interventions were performed: BMP and continuous cardiac monitoring .  Medical problem. Staff concerns included critical lab  abnormality and acute decrease in urine output, < 50 ml in 4 hours. The following interventions were performed: critical care consulted and lactic acid, POCT venous blood gas with lytes.    Venous Blood Gas result:  pCO2 27.8; pH 7.213;  HCO3 11.2, BE -17. K+ on VBG resulted >9.0 however suspect component of hemolysis, STAT BMP sent    Serum lactic acid resulted >12    Patient discussed with remote provider Dr Mera. Recommended to MD to consult nephrology. Also recommending to Dr Mera to hold on aggressive fluid resuscitation at this time d/t concern for oliguria in the setting of ANDRES and concern for fluid volume overload. Consult placed to nephro, and I spoke with Dr Amaya on nephrology. He reviewed the patient's chart and came to bedside to evaluate her.   Consent was obtained by Dr Amaya for hemodialysis. MD also requested critical care medicine consult, which was placed and CCM was contacted by me as well.    Repeat VBG obtained d/t concern for hemolysis on previous VBG causing falsely elevated K+ with a K+ value of 5.5    CCM to bedside to evaluate patient, decision made to transfer to MICU for higher level of care, probable hemodialysis. Pt's  at bedside in agreement with plan    RECOMMENDATIONS    We recommend: transfer to medical ICU for higher level of care    PROVIDER ESCALATION    Orders received and case discussed with Dr. Mera covering for medical oncology, Dr Amaya with nephrology, Mami Hernandez NP with critical care .    Primary team arrival time: n/a - remote provider    Disposition: Tx in ICU bed 7068.  Pt transported to MICU on continuous cardiac, SpO2, NIBP monitoring    FOLLOW UP    MICU Bedside [facility]  given bedside handoff, updated on plan of care. Instructed to call the Rapid Response NurseLORENA RN at [phone] for additional questions or concerns.         No

## 2024-11-26 PROBLEM — Z51.5 COMFORT MEASURES ONLY STATUS: Status: ACTIVE | Noted: 2024-01-01

## 2024-11-26 NOTE — PLAN OF CARE
Pt comfort care. Pt sleeping peacefully in the bed with family at bedside. Will continue to monitor for any signs of distress.

## 2024-11-26 NOTE — DISCHARGE SUMMARY
[facility]  Critical Care Medicine  Discharge Summary      Patient Name: Tosin Ma  MRN: [id number]  Admission Date: 11/23/2024  Hospital Length of Stay: 3 days  Discharge Date and Time:  11/26/2024 2:56 PM  Attending Physician: Nora Garcia MD   Discharging Provider: Alina Velázquez DNP  Primary Care Provider: Lake Cunningham MD  Reason for Admission: Metastatic cancer    HPI:   Ms. Ma is an extremely pleasant 69yoF with PMHx significant for HTN, HLD, DM2, breast cancer, and colon adenocarcinoma with mets to the liver (on OP GI [id number] (oxaliplatin leucovorin fluorouracil) Q2W, Day 1 Cycle 2 on hold). Pt. Is known to Dr. Parham. She presented to the ED on 11/23/24 with c/o vomiting, diarrhea, generalized weakness, and inability to keep food or fluid down since 11/19/24. She denied fever, chills, chest pain, shortness of breath, dysuria, headache, or abd pain.     While in the ED, HDS. Labs significant for Hgb 8.7, plts 614, creatinine of 1.3 (baseline ~0.9), glucose 65, ALP 1663, bili 17.1, AST//105. CT abd/pelvis with markedly enlarged heterogeneous appearance of the liver consistent with biopsy-proven metastasis, mass effect of the left hepatic lobe on the gastric fundus which appears more prominent compared to prior studies, and fluid in the distal esophagus suggesting either reflux of poor esophageal motility. Was admitted initially to Hem/Onc for management.    CCM consulted on the morning of 11/25/24 for worsening renal function with oliguria (creat 2.5, CO2 9), lactic acidosis (LA >12), and encephalopathy. Transferred to MICU for trialysis line placement for CRRT per nephrology.     * No surgery found *    Physical Exam  Vitals and nursing note reviewed.   Constitutional:       Appearance: She is ill-appearing.   Eyes:      Pupils: Pupils are equal, round, and reactive to light.   Cardiovascular:      Rate and Rhythm: Regular rhythm. Tachycardia  present.      Pulses: Normal pulses.      Heart sounds: Normal heart sounds.   Pulmonary:      Effort: Pulmonary effort is normal.      Breath sounds: Normal breath sounds.   Neurological:      Mental Status: Mental status is at baseline.        Indwelling Lines/Drains at Time of Discharge:   Lines/Drains/Airways       Central Venous Catheter Line  Duration             Trialysis (Dialysis) Catheter 11/25/24 0218 right internal jugular 1 day              Drain  Duration             Female External Urinary Catheter w/ Suction 11/23/24 1407 3 days                  Hospital Course:   No notes on file    Consults (From admission, onward)          Status Ordering Provider     Inpatient consult to Palliative Care  Once        Provider:  (Not yet assigned)    Completed ARNEL RODRIGUEZ     Inpatient consult to Palliative Care  Once        Provider:  (Not yet assigned)    Completed ARNEL RODRIGUEZ     Inpatient consult to Critical Care Medicine  Once        Provider:  (Not yet assigned)    Completed JARRETT RON     Inpatient consult to Nephrology  Once        Provider:  (Not yet assigned)    Completed SHUKRI GARCIA     Inpatient consult to Hematology/Oncology  Once        Provider:  (Not yet assigned)    Completed ARABELLA ACLAZAR          Significant Labs:  All pertinent labs within the past 24 hours have been reviewed.    Significant Imaging:  I have reviewed all pertinent imaging results/findings within the past 24 hours.    Pending Diagnostic Studies:       None          Final Active Diagnoses:    Diagnosis Date Noted POA    PRINCIPAL PROBLEM:  ANDRES (acute kidney injury) [N17.9] 11/23/2024 Yes    Comfort measures only status [Z51.5] 11/26/2024 Not Applicable    Diarrhea [R19.7] 11/25/2024 Yes    Goals of care, counseling/discussion [Z71.89] 11/25/2024 Not Applicable    Encephalopathy, metabolic [G93.41] 11/25/2024 Yes    Sepsis with acute organ dysfunction [A41.9, R65.20] 11/25/2024 Yes    Palliative care  encounter [Z51.5] 11/25/2024 Not Applicable    Metabolic acidosis [E87.20] 11/24/2024 Yes    Nausea & vomiting [R11.2] 11/24/2024 Yes    Colon adenocarcinoma [C18.9] 11/14/2024 Yes    Metastasis to liver [C78.7] 11/05/2024 Yes    Serum total bilirubin elevated [R17] 11/05/2024 Yes    Type 2 diabetes mellitus with hyperglycemia, without long-term current use of insulin [E11.65] 11/04/2024 Yes    Essential hypertension [I10] 09/03/2020 Yes    History of breast cancer [Z85.3] 08/04/2014 Not Applicable      Problems Resolved During this Admission:     Palliative Care  Comfort measures only status  Pt to be transferred to hospice unit.       Discharged Condition: poor    Disposition:       Patient Instructions:   No discharge procedures on file.  Medications:  Reconciled Home Medications:      Medication List        ASK your doctor about these medications      cyproheptadine 4 mg tablet  Commonly known as: PERIACTIN  Take 1 tablet (4 mg total) by mouth 3 (three) times daily. To increase appetite     dicyclomine 10 MG capsule  Commonly known as: BENTYL  Take 1 capsule (10 mg total) by mouth before meals as needed (For abdominal pain/bloating).     hydroCHLOROthiazide 25 MG tablet  Commonly known as: HYDRODIURIL  Take 25 mg by mouth once daily.     NIFEdipine 60 MG (OSM) 24 hr tablet  Commonly known as: PROCARDIA-XL  Take 60 mg by mouth once daily.     OLANZapine 5 MG tablet  Commonly known as: ZyPREXA  Take 1 tablet (5 mg total) by mouth every evening. Take nightly on days 1-3 of each chemotherapy cycle.     * ondansetron 4 MG Tbdl  Commonly known as: ZOFRAN-ODT  Take 1 tablet (4 mg total) by mouth every 6 (six) hours as needed (nausea / vomiting).     * ondansetron 4 MG Tbdl  Commonly known as: ZOFRAN-ODT  Take 1 tablet (4 mg total) by mouth every 6 (six) hours as needed (nausea with vomiting).     oxyCODONE 5 MG immediate release tablet  Commonly known as: ROXICODONE  Take 1 tablet (5 mg total) by mouth every 6 (six)  hours as needed for Pain.     polyethylene glycol 17 gram Pwpk  Commonly known as: GLYCOLAX  Take 17 g by mouth once daily.     prochlorperazine 5 MG tablet  Commonly known as: COMPAZINE  Take 1 tablet (5 mg total) by mouth every 6 (six) hours as needed for Nausea.           * This list has 2 medication(s) that are the same as other medications prescribed for you. Read the directions carefully, and ask your doctor or other care provider to review them with you.                   Alina Velázquez DNP  Critical Care Medicine  [facility]

## 2024-11-26 NOTE — PROGRESS NOTES
[facility]  Palliative Medicine  Progress Note    Patient Name: Tosin Ma  MRN: [id number]  Admission Date: 11/23/2024  Hospital Length of Stay: 3 days  Code Status: DNR   Attending Provider: Nora Garcia MD  Consulting Provider: Nora Garcia MD  Primary Care Physician: Lake Cunningham MD  Principal Problem:ANDRES (acute kidney injury)    Patient information was obtained from patient, spouse/SO, relative(s), and primary team.      Assessment/Plan:     Palliative Care  Palliative care encounter  Tosin Ma is a 69-year-old woman with a history of stage IV colon adenocarcinoma with metastasis to the liver (followed by Dr. Manriquez), breast cancer (1996), DM2, who was admitted for intractable nausea/vomiting, course complicated by oliguric acute renal failure and stepped up to the MICU for CRRT. Unfortunately despite aggressive medical management, Mrs. aM has approached end of life and family agreed to focus on comfort measures, including discontinuation of life-sustaining measures to avoid prolongation of suffering. Palliative and Supportive Care was consulted to explore goals of care and transfer to the inpatient hospice/palliative care unit.    Interval update 11/26/2024 - admitted to hospice unit, family updated at bedside, progressing as expected    Terminal diagnosis: Stage IV colon adenocarcinoma   - Acute renal failure with oliguria    Need for inpatient care: Active dying process as evidenced by loss of consciousness, cool extremities, and irregular breathing pattern. Requiring IV opioids and benzodiazepines for pain behaviors, respiratory distress, and agitation. Likely prognosis of hours to short days    Dispo: anticipate end-of-life care on the unit, if patient unexpectedly stabilizes, will approach family regarding care at home or alternative in-facility/intermediate placement with hospice support    Symptom Assessment  - Pain/ pain behaviors: controlled  -  Dyspnea/tachypnea: controlled  - Agitation: controlled  - Mentation: minimally responsive    Symptom Oriented Management:    Tachypnea/Dyspnea:   - Morphine 4 mg IV q15min PRN pain behavior (groaning, grimacing, guarding), labored breathing  - Lorazepam 0.5 mg IV q30min PRN anxiety, labored breathing refractory to opioids  - Fan at bedside  - Avoid artificial hydration  - Treat fevers symptomatically with PRN APAP, NSAID's, and if refractory to these measures, dexamethasone     Pain Behaviors:   - Opioids as above  - Turn only as tolerated     Agitation/Anxiety/Encephalopathy:   - Treat for pain/labored breathing as above  - Lorazepam 0.5 mg IV q30min PRN anxiety/agitation, labored breathing refractory to opioids  - Haloperidol 1 mg IV q4h PRN agitation, nausea/vomiting refractory to ondansetron    Profuse Oropharyngeal Secretions:  - Turn head side to side to help shift secretions out of airway, allow to pool to cheeks and out of mouth  - Gentle, frequent oral care - encourage family at bedside to participate  - Yankauer suction at bedside  - Glycopyrrolate 0.3 mg IV q4h PRN profuse oropharyngeal secretions    Nausea/Vomiting/Retching:  - Known colon cancer with prior bowel obstructions  - Ondansetron 4 mg IV q6h PRN nausea/vomiting  - Promethazine 12.5 mg PO q6h PRN nausea/vomiting, 2nd line  - Haloperidol 1 mg IV q6h PRN nausea/vomiting, 3rd line  - Lorazepam 1 mg IV q30min PRN nausea/vomiting, 4th line    Nutrition / Hydration  - No IV fluids and artificial nutrition  - Liberalize diet in order to permit comfort feedings as desired and tolerated  - Simplified medication regimen by eliminating those medications that provide little to no benefit at end-of-life    Bowel Regimen:  - Bisacodyl 10 mg suppository daily PRN no bowel movement in 5 days  - Will not prioritize at end of life    Fever  - Apply cold compresses  - Fan at bedside  - Acetaminophen 650 mg suppository q4h PRN fever        I will follow-up with  patient. Please contact us if you have any additional questions.    Subjective:     Chief Complaint:   Chief Complaint   Patient presents with    Emesis     Hx of colon cancer on chemo       HPI:   Tosin Ma is a 69-year-old woman with a history of stage IV colon adenocarcinoma with metastasis to the liver (followed by Dr. Manriquez), breast cancer, DM2, who was admitted for intractable nausea/vomiting, course complicated by oliguric acute renal failure and stepped up to the MICU for CRRT. Unfortunately despite aggressive medical management, Mrs. Ma has approached end of life and family agreed to focus on comfort measures, including discontinuation of life-sustaining measures to avoid prolongation of suffering. Palliative and Supportive Care was consulted to explore goals of care and transfer to the inpatient hospice/palliative care unit.    Hospital Course:  No notes on file    Interval History: Transfer to inpatient palliative care unit    Medications:  Continuous Infusions:  Scheduled Meds:  PRN Meds:  Current Facility-Administered Medications:     glycopyrrolate, 0.3 mg, Intravenous, Q4H PRN    haloperidol lactate, 1 mg, Intravenous, Q4H PRN    lorazepam, 0.5 mg, Intravenous, Q30 Min PRN    morphine, 4 mg, Intravenous, Q15 Min PRN    ondansetron, 4 mg, Intravenous, Q6H PRN    promethazine, 12.5 mg, Oral, Q6H PRN    Objective:     Vital Signs (Most Recent):  Temp: 98 °F (36.7 °C) (11/25/24 1901)  Pulse: 102 (11/26/24 0600)  Resp: 19 (11/26/24 0600)  BP: (!) 89/54 (11/26/24 0000)  SpO2: 100 % (11/26/24 0600) Vital Signs (24h Range):  Temp:  [98 °F (36.7 °C)] 98 °F (36.7 °C)  Pulse:  [102-112] 102  Resp:  [14-33] 19  SpO2:  [99 %-100 %] 100 %  BP: (86-89)/(53-54) 89/54     Weight: 63.3 kg (139 lb 8.8 oz)  Body mass index is 26.37 kg/m².       Physical Exam  Constitutional:       General: She is not in acute distress.     Appearance: She is ill-appearing.   HENT:      Head: Normocephalic.      Right Ear: External  ear normal.      Left Ear: External ear normal.      Nose: Nose normal.      Mouth/Throat:      Mouth: Mucous membranes are dry.   Eyes:      Extraocular Movements: Extraocular movements intact.      Conjunctiva/sclera: Conjunctivae normal.   Cardiovascular:      Rate and Rhythm: Normal rate.   Pulmonary:      Effort: Pulmonary effort is normal.      Breath sounds: Normal breath sounds.   Abdominal:      General: There is distension.      Tenderness: There is abdominal tenderness.   Musculoskeletal:         General: Swelling present.   Skin:     Findings: Bruising present.   Neurological:      Mental Status: She is disoriented.            Review of Symptoms      Symptom Assessment (ESAS 0-10 Scale)  Unable to complete assessment due to Mental status change         Pain Assessment in Advanced Demential Scale (PAINAD)   Breathing - Independent of vocalization:  0  Negative vocalization:  0  Facial expression:  1  Body language:  1  Consolability:  0  Total:  2    Living Arrangements:  Lives with spouse    Psychosocial/Cultural:   See Palliative Psychosocial Note: No   to  for 47 years, had been together for 50 years. One daughter Oriana and one son. Worked as an  for many years  **Primary  to Follow**  Palliative Care  Consult: No    Spiritual:  F - Tyra and Belief:  Roman Catholic  I - Importance:  Important  C - Community:  N/A  A - Address in Care:   support engaged        Advance Care Planning  Advance Directives:   Living Will: No    LaPOST: No    Do Not Resuscitate Status: Yes    Medical Power of : No      Decision Making:  Family answered questions  Goals of Care: 11/26/24: Supportive conversation held with Mrs. Ma's  and daughter at bedside. Mrs. Ma is extremely fatigued and listens during our conversation while her family supplements history and answers questions. They clarify that in the setting of Mrs. Ma's terminal illness,  "they would want her to be as comfortable as possible and do not wish to prolong her suffering. 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 Unfortunately there would be no one at home who would be able to provide care for Mrs. Ma, especially at this level of debility. Family is appropriately tearful and expresses sincere gratitude for palliative provider FLAVIO Chacon for her compassion and kindness. We discussed the philosophy of care and limitations of the 3rd floor inpatient hospice unit and they were agreeable with the expectations of care in the unit including transfer of Mrs. Ma for aggressive symptom management at the end of life.         Significant Labs: CBC:   Recent Labs   Lab 11/24/24 2045 11/24/24 2219 11/24/24 2357 11/25/24  0345   WBC 14.63*  --   --  15.04*   HGB 8.4*  --   --  8.5*   HCT 26.8* 30* 52 26.6*   *  --   --  576*     CMP:   Recent Labs   Lab 11/24/24 2045 11/24/24 2343 11/25/24  0345   * 132* 134*  134*   K 4.1 4.1 4.2  4.2   CL 95 95 96  96   CO2 9* 8* 9*  9*   * 116* 79  79   BUN 65* 61* 68*  68*   CREATININE 2.3* 2.3* 2.5*  2.5*   CALCIUM 8.9 8.9 8.7  8.7   PROT 5.6*  --  5.6*   ALBUMIN 1.5*  --  1.5*  1.5*   BILITOT 16.5*  --  18.1*   ALKPHOS 1,719*  --  1,798*   *  --  220*   *  --  116*   ANIONGAP 30* 29* 29*  29*     CBC:   Recent Labs   Lab 11/25/24 0345   WBC 15.04*   HGB 8.5*   HCT 26.6*   MCV 90   *     BMP:  No results for input(s): "GLU", "NA", "K", "CL", "CO2", "BUN", "CREATININE", "CALCIUM", "MG" in the last 24 hours.  LFT:  Lab Results   Component Value Date     (H) 11/25/2024    ALKPHOS 1,798 (H) 11/25/2024    BILITOT 18.1 (H) 11/25/2024     Albumin:   Albumin   Date Value Ref Range Status   11/25/2024 1.5 (L) 3.5 - 5.2 g/dL Final   11/25/2024 1.5 (L) 3.5 - 5.2 g/dL Final     Protein:   Total Protein   Date Value Ref Range Status   11/25/2024 5.6 " (L) 6.0 - 8.4 g/dL Final     Lactic acid:   Lab Results   Component Value Date    LACTATE >12.0 () 11/25/2024    LACTATE >12.0 () 11/25/2024       Significant Imaging: I have reviewed all pertinent imaging results/findings within the past 24 hours.    I spent a total of 50 minutes on the day of the visit. This includes face to face time in discussion of goals of care, symptom assessment, coordination of care and emotional support. This also includes non-face to face time preparing to see the patient (eg, review of tests/imaging), obtaining and/or reviewing separately obtained history, documenting clinical information in the electronic or other health record, independently interpreting results and communicating results to the patient/family/caregiver, or care coordinator.    Nora Garcia MD  Palliative Medicine  [facility]

## 2024-11-26 NOTE — ASSESSMENT & PLAN NOTE
Tosin Ma is a 69-year-old woman with a history of stage IV colon adenocarcinoma with metastasis to the liver (followed by Dr. Manriquez), breast cancer (1996), DM2, who was admitted for intractable nausea/vomiting, course complicated by oliguric acute renal failure and stepped up to the MICU for CRRT. Unfortunately despite aggressive medical management, Mrs. Ma has approached end of life and family agreed to focus on comfort measures, including discontinuation of life-sustaining measures to avoid prolongation of suffering. Palliative and Supportive Care was consulted to explore goals of care and transfer to the inpatient hospice/palliative care unit.    Interval update 11/26/2024 - admitted to hospice unit, family updated at bedside, progressing as expected    Terminal diagnosis: Stage IV colon adenocarcinoma   - Acute renal failure with oliguria    Need for inpatient care: Active dying process as evidenced by loss of consciousness, cool extremities, and irregular breathing pattern. Requiring IV opioids and benzodiazepines for pain behaviors, respiratory distress, and agitation. Likely prognosis of hours to short days    Dispo: anticipate end-of-life care on the unit, if patient unexpectedly stabilizes, will approach family regarding care at home or alternative in-facility/prison placement with hospice support    Symptom Assessment  - Pain/ pain behaviors: controlled  - Dyspnea/tachypnea: controlled  - Agitation: controlled  - Mentation: minimally responsive    Symptom Oriented Management:    Tachypnea/Dyspnea:   - Morphine 4 mg IV q15min PRN pain behavior (groaning, grimacing, guarding), labored breathing  - Lorazepam 0.5 mg IV q30min PRN anxiety, labored breathing refractory to opioids  - Fan at bedside  - Avoid artificial hydration  - Treat fevers symptomatically with PRN APAP, NSAID's, and if refractory to these measures, dexamethasone     Pain Behaviors:   - Opioids as above  - Turn only as tolerated      Agitation/Anxiety/Encephalopathy:   - Treat for pain/labored breathing as above  - Lorazepam 0.5 mg IV q30min PRN anxiety/agitation, labored breathing refractory to opioids  - Haloperidol 1 mg IV q4h PRN agitation, nausea/vomiting refractory to ondansetron    Profuse Oropharyngeal Secretions:  - Turn head side to side to help shift secretions out of airway, allow to pool to cheeks and out of mouth  - Gentle, frequent oral care - encourage family at bedside to participate  - Yankauer suction at bedside  - Glycopyrrolate 0.3 mg IV q4h PRN profuse oropharyngeal secretions    Nausea/Vomiting/Retching:  - Known colon cancer with prior bowel obstructions  - Ondansetron 4 mg IV q6h PRN nausea/vomiting  - Promethazine 12.5 mg PO q6h PRN nausea/vomiting, 2nd line  - Haloperidol 1 mg IV q6h PRN nausea/vomiting, 3rd line  - Lorazepam 1 mg IV q30min PRN nausea/vomiting, 4th line    Nutrition / Hydration  - No IV fluids and artificial nutrition  - Liberalize diet in order to permit comfort feedings as desired and tolerated  - Simplified medication regimen by eliminating those medications that provide little to no benefit at end-of-life    Bowel Regimen:  - Bisacodyl 10 mg suppository daily PRN no bowel movement in 5 days  - Will not prioritize at end of life    Fever  - Apply cold compresses  - Fan at bedside  - Acetaminophen 650 mg suppository q4h PRN fever

## 2024-11-26 NOTE — PLAN OF CARE
Problem: Coping Ineffective  Goal: Effective Coping  Outcome: Progressing     Problem: Palliative Care  Goal: Enhanced Quality of Life  11/26/2024 1551 by Izzy Guajardo, RN  Outcome: Progressing  11/26/2024 1550 by Izzy Guajardo, RN  Outcome: Progressing     Problem: Pain Acute  Goal: Optimal Pain Control and Function  Outcome: Progressing

## 2024-11-26 NOTE — NURSING
"14:45 pt arrived to Palliative care unit, via ICU bed, per ICU RN X2 and placed in room 386.  Pt oriented to self, states that she's in [facility] in "[facility]," unable to state date or year.  Continuously repeats "[facility]." Denies pain.  Denies SOB. Skin and sclera jaundice.  Resp even and unlabored on RA.  Right neck trialysis cath drsg CDI, no swelling, no redness or drainage noted at this time.  Pt reportedly has not urinated in over 24 hours, no purwick needed at this time.  Chucks pads in place.  See full assess on flow sheet.  No distress noted at this time.  Will cont. To monitor.  NOTE:  pt has very large family at bedside and in family room.  Notified to call nurse for any comfort needs.  Verbalized understanding.   18:00 turned and repositioned.  Bed bath given.  pt grimcaing.  Morphine 4mg IVP admin, see MAR.   at bedside.    18:30 pt awake and alert talking clearly.  Denies pain at this time. Will cont. To monitor.   18:45 shift change report given to YASMINE Ricardo.  "

## 2024-11-26 NOTE — SUBJECTIVE & OBJECTIVE
Interval History: Transfer to inpatient palliative care unit    Medications:  Continuous Infusions:  Scheduled Meds:  PRN Meds:  Current Facility-Administered Medications:     glycopyrrolate, 0.3 mg, Intravenous, Q4H PRN    haloperidol lactate, 1 mg, Intravenous, Q4H PRN    lorazepam, 0.5 mg, Intravenous, Q30 Min PRN    morphine, 4 mg, Intravenous, Q15 Min PRN    ondansetron, 4 mg, Intravenous, Q6H PRN    promethazine, 12.5 mg, Oral, Q6H PRN    Objective:     Vital Signs (Most Recent):  Temp: 98 °F (36.7 °C) (11/25/24 1901)  Pulse: 102 (11/26/24 0600)  Resp: 19 (11/26/24 0600)  BP: (!) 89/54 (11/26/24 0000)  SpO2: 100 % (11/26/24 0600) Vital Signs (24h Range):  Temp:  [98 °F (36.7 °C)] 98 °F (36.7 °C)  Pulse:  [102-112] 102  Resp:  [14-33] 19  SpO2:  [99 %-100 %] 100 %  BP: (86-89)/(53-54) 89/54     Weight: 63.3 kg (139 lb 8.8 oz)  Body mass index is 26.37 kg/m².       Physical Exam  Constitutional:       General: She is not in acute distress.     Appearance: She is ill-appearing.   HENT:      Head: Normocephalic.      Right Ear: External ear normal.      Left Ear: External ear normal.      Nose: Nose normal.      Mouth/Throat:      Mouth: Mucous membranes are dry.   Eyes:      Extraocular Movements: Extraocular movements intact.      Conjunctiva/sclera: Conjunctivae normal.   Cardiovascular:      Rate and Rhythm: Normal rate.   Pulmonary:      Effort: Pulmonary effort is normal.      Breath sounds: Normal breath sounds.   Abdominal:      General: There is distension.      Tenderness: There is abdominal tenderness.   Musculoskeletal:         General: Swelling present.   Skin:     Findings: Bruising present.   Neurological:      Mental Status: She is disoriented.            Review of Symptoms      Symptom Assessment (ESAS 0-10 Scale)  Unable to complete assessment due to Mental status change         Pain Assessment in Advanced Demential Scale (PAINAD)   Breathing - Independent of vocalization:  0  Negative  vocalization:  0  Facial expression:  1  Body language:  1  Consolability:  0  Total:  2    Living Arrangements:  Lives with spouse    Psychosocial/Cultural:   See Palliative Psychosocial Note: No   to  for 47 years, had been together for 50 years. One daughter Oriana and one son. Worked as an  for many years  **Primary  to Follow**  Palliative Care  Consult: No    Spiritual:  F - Tyra and Belief:  Scientologist  I - Importance:  Important  C - Community:  N/A  A - Address in Care:   support engaged        Advance Care Planning   Advance Directives:   Living Will: No    LaPOST: No    Do Not Resuscitate Status: Yes    Medical Power of : No      Decision Making:  Family answered questions  Goals of Care: 11/26/24: Supportive conversation held with Mrs. Ma's  and daughter at bedside. Mrs. Ma is extremely fatigued and listens during our conversation while her family supplements history and answers questions. They clarify that in the setting of Mrs. Ma's terminal illness, they would want her to be as comfortable as possible and do not wish to prolong her suffering. 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 Unfortunately there would be no one at home who would be able to provide care for Mrs. Ma, especially at this level of debility. Family is appropriately tearful and expresses sincere gratitude for palliative provider FLAVIO Chacon for her compassion and kindness. We discussed the philosophy of care and limitations of the 3rd floor inpatient hospice unit and they were agreeable with the expectations of care in the unit including transfer of Mrs. Ma for aggressive symptom management at the end of life.         Significant Labs: CBC:   Recent Labs   Lab 11/24/24  2045 11/24/24  2219 11/24/24  2357 11/25/24  0345   WBC 14.63*  --   --  15.04*   HGB 8.4*  --   --  8.5*   HCT  "26.8* 30* 52 26.6*   *  --   --  576*     CMP:   Recent Labs   Lab 11/24/24  2045 11/24/24  2343 11/25/24  0345   * 132* 134*  134*   K 4.1 4.1 4.2  4.2   CL 95 95 96  96   CO2 9* 8* 9*  9*   * 116* 79  79   BUN 65* 61* 68*  68*   CREATININE 2.3* 2.3* 2.5*  2.5*   CALCIUM 8.9 8.9 8.7  8.7   PROT 5.6*  --  5.6*   ALBUMIN 1.5*  --  1.5*  1.5*   BILITOT 16.5*  --  18.1*   ALKPHOS 1,719*  --  1,798*   *  --  220*   *  --  116*   ANIONGAP 30* 29* 29*  29*     CBC:   Recent Labs   Lab 11/25/24  0345   WBC 15.04*   HGB 8.5*   HCT 26.6*   MCV 90   *     BMP:  No results for input(s): "GLU", "NA", "K", "CL", "CO2", "BUN", "CREATININE", "CALCIUM", "MG" in the last 24 hours.  LFT:  Lab Results   Component Value Date     (H) 11/25/2024    ALKPHOS 1,798 (H) 11/25/2024    BILITOT 18.1 (H) 11/25/2024     Albumin:   Albumin   Date Value Ref Range Status   11/25/2024 1.5 (L) 3.5 - 5.2 g/dL Final   11/25/2024 1.5 (L) 3.5 - 5.2 g/dL Final     Protein:   Total Protein   Date Value Ref Range Status   11/25/2024 5.6 (L) 6.0 - 8.4 g/dL Final     Lactic acid:   Lab Results   Component Value Date    LACTATE >12.0 (HH) 11/25/2024    LACTATE >12.0 (HH) 11/25/2024       Significant Imaging: I have reviewed all pertinent imaging results/findings within the past 24 hours.  "

## 2024-11-27 PROBLEM — E87.20 METABOLIC ACIDOSIS: Status: RESOLVED | Noted: 2024-01-01 | Resolved: 2024-01-01

## 2024-11-27 PROBLEM — Z51.5 PALLIATIVE CARE ENCOUNTER: Status: RESOLVED | Noted: 2024-01-01 | Resolved: 2024-01-01

## 2024-11-27 PROBLEM — C78.7 METASTASIS TO LIVER: Status: RESOLVED | Noted: 2024-01-01 | Resolved: 2024-01-01

## 2024-11-27 PROBLEM — E11.65 TYPE 2 DIABETES MELLITUS WITH HYPERGLYCEMIA, WITHOUT LONG-TERM CURRENT USE OF INSULIN: Status: RESOLVED | Noted: 2024-01-01 | Resolved: 2024-01-01

## 2024-11-27 PROBLEM — R19.7 DIARRHEA: Status: RESOLVED | Noted: 2024-01-01 | Resolved: 2024-01-01

## 2024-11-27 PROBLEM — R65.20 SEPSIS WITH ACUTE ORGAN DYSFUNCTION: Status: RESOLVED | Noted: 2024-01-01 | Resolved: 2024-01-01

## 2024-11-27 PROBLEM — G93.41 ENCEPHALOPATHY, METABOLIC: Status: RESOLVED | Noted: 2024-01-01 | Resolved: 2024-01-01

## 2024-11-27 PROBLEM — N17.9 AKI (ACUTE KIDNEY INJURY): Status: RESOLVED | Noted: 2024-01-01 | Resolved: 2024-01-01

## 2024-11-27 PROBLEM — R17 SERUM TOTAL BILIRUBIN ELEVATED: Status: RESOLVED | Noted: 2024-01-01 | Resolved: 2024-01-01

## 2024-11-27 PROBLEM — Z51.5 COMFORT MEASURES ONLY STATUS: Status: RESOLVED | Noted: 2024-01-01 | Resolved: 2024-01-01

## 2024-11-27 PROBLEM — I10 ESSENTIAL HYPERTENSION: Status: RESOLVED | Noted: 2020-09-03 | Resolved: 2024-01-01

## 2024-11-27 PROBLEM — Z71.89 GOALS OF CARE, COUNSELING/DISCUSSION: Status: RESOLVED | Noted: 2024-01-01 | Resolved: 2024-01-01

## 2024-11-27 PROBLEM — A41.9 SEPSIS WITH ACUTE ORGAN DYSFUNCTION: Status: RESOLVED | Noted: 2024-01-01 | Resolved: 2024-01-01

## 2024-11-27 PROBLEM — R11.2 NAUSEA & VOMITING: Status: RESOLVED | Noted: 2024-01-01 | Resolved: 2024-01-01

## 2024-11-27 NOTE — PLAN OF CARE
19:42 Bedside handoff report received. Patient is awake, oriented to self but not in situation or place. Patient respiration is even and unlabored at this time. Skin and scleral jaundice noted. No distress or discomfort at this time.  No pain behavior or facial grimacing noted. Family at bedside and encouraged to ask anything if in need. Ongoing plan of care and monitoring for any changes.    23:07 Call bell used by the . Patient started to groan and Increase work of breathing noted. Patient respiration at 22bpm. PRN Morphine given as ordered. Will continue to monitor for any changes. Ongoing plan of care and monitoring.    00:13 Patient currently resting both eyes are closed. No discomfort at this time. No pain behavior noted. Ongoing plan of care.    4:35 Patient is resting both eyes are closed. Respiration is even and unlabored at this time. No distress noted at this time.  on bedside.     Problem: Adult Inpatient Plan of Care  Goal: Plan of Care Review  Outcome: Progressing  Goal: Patient-Specific Goal (Individualized)  Outcome: Progressing  Goal: Absence of Hospital-Acquired Illness or Injury  Outcome: Progressing  Goal: Optimal Comfort and Wellbeing  Outcome: Progressing  Goal: Readiness for Transition of Care  Outcome: Progressing     Problem: Acute Kidney Injury/Impairment  Goal: Fluid and Electrolyte Balance  Outcome: Progressing  Goal: Improved Oral Intake  Outcome: Progressing  Goal: Effective Renal Function  Outcome: Progressing     Problem: Palliative Care  Goal: Enhanced Quality of Life  Outcome: Progressing     Problem: Pain Acute  Goal: Optimal Pain Control and Function  Outcome: Progressing     Problem: Fall Injury Risk  Goal: Absence of Fall and Fall-Related Injury  Outcome: Progressing

## 2024-11-27 NOTE — PROGRESS NOTES
[facility] - [facility]ice and Palliative Medicine  Palliative Medicine  Progress Note    Patient Name: Tosin Ma  MRN: [id number]  Admission Date: 11/23/2024  Hospital Length of Stay: 4 days  Code Status: DNR   Attending Provider: Nora Garcia MD  Consulting Provider: Nora Garcia MD  Primary Care Physician: Lake Cunningham MD  Principal Problem:ANDRES (acute kidney injury)    Patient information was obtained from patient, spouse/SO, and primary team.      Assessment/Plan:     Palliative Care  Palliative care encounter  Tosin Ma is a 69-year-old woman with a history of stage IV colon adenocarcinoma with metastasis to the liver (followed by Dr. Manriquez), breast cancer (1996), DM2, who was admitted for intractable nausea/vomiting, course complicated by oliguric acute renal failure and stepped up to the MICU for CRRT. Unfortunately despite aggressive medical management, Mrs. Ma has approached end of life and family agreed to focus on comfort measures, including discontinuation of life-sustaining measures to avoid prolongation of suffering. Palliative and Supportive Care was consulted to explore goals of care and transfer to the inpatient hospice/palliative care unit.    Interval update 11/27/2024 - had bowel movement yesterday, demonstrating signs of pain with groaning and grimacing.  did not sleep well last night because wife was groaning from pain and he was worried. Received two doses of morphine 4 mg IV at 18:40 and 23:01    Terminal diagnosis: Stage IV colon adenocarcinoma   - Acute renal failure with oliguria    Need for inpatient care: Active dying process as evidenced by loss of consciousness, cool extremities, and irregular breathing pattern. Requiring IV opioids and benzodiazepines for pain behaviors, respiratory distress, and agitation. Likely prognosis of hours to short days    Dispo: anticipate end-of-life care on the unit, if patient unexpectedly stabilizes, will approach family regarding care at  home or alternative in-facility/half-way placement with hospice support    Symptom Assessment  - Pain/ pain behaviors: uncontrolled  - Dyspnea/tachypnea: controlled  - Agitation: controlled  - Mentation: minimally responsive    Symptom Oriented Management:    Tachypnea/Dyspnea:   - Morphine 4 mg IV q15min PRN pain behavior (groaning, grimacing, guarding), labored breathing  - Lorazepam 0.5 mg IV q30min PRN anxiety, labored breathing refractory to opioids  - Fan at bedside  - Avoid artificial hydration  - Treat fevers symptomatically with PRN APAP, NSAID's, and if refractory to these measures, dexamethasone     Pain Behaviors:   - Opioids as above  - Turn only as tolerated     Agitation/Anxiety/Encephalopathy:   - Treat for pain/labored breathing as above  - Lorazepam 0.5 mg IV q30min PRN anxiety/agitation, labored breathing refractory to opioids  - Haloperidol 1 mg IV q4h PRN agitation, nausea/vomiting refractory to ondansetron    Profuse Oropharyngeal Secretions:  - Turn head side to side to help shift secretions out of airway, allow to pool to cheeks and out of mouth  - Gentle, frequent oral care - encourage family at bedside to participate  - Yankauer suction at bedside  - Glycopyrrolate 0.3 mg IV q4h PRN profuse oropharyngeal secretions    Nausea/Vomiting/Retching:  - Known colon cancer with prior bowel obstructions  - Ondansetron 4 mg IV q6h PRN nausea/vomiting  - Promethazine 12.5 mg PO q6h PRN nausea/vomiting, 2nd line  - Haloperidol 1 mg IV q6h PRN nausea/vomiting, 3rd line  - Lorazepam 1 mg IV q30min PRN nausea/vomiting, 4th line    Nutrition / Hydration  - No IV fluids and artificial nutrition  - Liberalize diet in order to permit comfort feedings as desired and tolerated  - Simplified medication regimen by eliminating those medications that provide little to no benefit at end-of-life    Bowel Regimen:  - Bisacodyl 10 mg suppository daily PRN no bowel movement in 5 days  - Will not prioritize at end of  life    Fever  - Apply cold compresses  - Fan at bedside  - Acetaminophen 650 mg suppository q4h PRN fever        I will follow-up with patient. Please contact us if you have any additional questions.    Subjective:     Chief Complaint:   Chief Complaint   Patient presents with    Emesis     Hx of colon cancer on chemo       HPI:   Tosin Ma is a 69-year-old woman with a history of stage IV colon adenocarcinoma with metastasis to the liver (followed by Dr. Manriquez), breast cancer, DM2, who was admitted for intractable nausea/vomiting, course complicated by oliguric acute renal failure and stepped up to the MICU for CRRT. Unfortunately despite aggressive medical management, Mrs. Ma has approached end of life and family agreed to focus on comfort measures, including discontinuation of life-sustaining measures to avoid prolongation of suffering. Palliative and Supportive Care was consulted to explore goals of care and transfer to the inpatient hospice/palliative care unit.    Hospital Course:  No notes on file    Interval History: Grimacing, struggling to answer orientation questions. Swelling noted in lower extremities.  at bedside.    Medications:  Continuous Infusions:  Scheduled Meds:  PRN Meds:  Current Facility-Administered Medications:     glycopyrrolate, 0.3 mg, Intravenous, Q4H PRN    haloperidol lactate, 1 mg, Intravenous, Q4H PRN    lorazepam, 0.5 mg, Intravenous, Q30 Min PRN    morphine, 4 mg, Intravenous, Q15 Min PRN    ondansetron, 4 mg, Intravenous, Q6H PRN    promethazine, 12.5 mg, Oral, Q6H PRN    Objective:     Vital Signs (Most Recent):  Temp: 97.5 °F (36.4 °C) (11/1957)  Pulse: 104 (11/1957)  Resp: (!) 22 (11/26/24 2301)  BP: (!) 100/56 (11/1957)  SpO2: 97 % (11/1957) Vital Signs (24h Range):  Temp:  [97.5 °F (36.4 °C)] 97.5 °F (36.4 °C)  Pulse:  [104-106] 104  Resp:  [16-26] 22  SpO2:  [97 %-99 %] 97 %  BP: (100)/(56) 100/56     Weight: 63.3 kg (139 lb 8.8  oz)  Body mass index is 26.37 kg/m².       Physical Exam  Constitutional:       Appearance: She is ill-appearing.   HENT:      Head: Normocephalic.      Right Ear: External ear normal.      Left Ear: External ear normal.      Nose: Nose normal.      Mouth/Throat:      Mouth: Mucous membranes are dry.   Eyes:      Extraocular Movements: Extraocular movements intact.      Conjunctiva/sclera: Conjunctivae normal.   Cardiovascular:      Rate and Rhythm: Normal rate.   Pulmonary:      Effort: Pulmonary effort is normal.      Breath sounds: Normal breath sounds.   Abdominal:      General: There is distension.      Tenderness: There is abdominal tenderness.   Musculoskeletal:         General: Swelling present.   Skin:     Findings: Bruising present.   Neurological:      Mental Status: She is disoriented.            Review of Symptoms      Symptom Assessment (ESAS 0-10 Scale)  Pain:  0  Dyspnea:  0  Anxiety:  0  Nausea:  0  Depression:  0  Anorexia:  0  Fatigue:  0  Insomnia:  0  Restlessness:  0  Agitation:  0  Unable to complete assessment due to Mental status change         Pain Assessment in Advanced Demential Scale (PAINAD)   Breathing - Independent of vocalization:  0  Negative vocalization:  0  Facial expression:  2  Body language:  1  Consolability:  0  Total:  3    Living Arrangements:  Lives with spouse    Psychosocial/Cultural:   See Palliative Psychosocial Note: No   to  for 47 years, had been together for 50 years. One daughter Oriana and one son. Worked as an  for many years  **Primary  to Follow**  Palliative Care  Consult: No    Spiritual:  F - Tyra and Belief:  Voodoo  I - Importance:  Important  C - Community:  N/A  A - Address in Care:   support engaged        Advance Care Planning  Advance Directives:   Living Will: No    LaPOST: No    Do Not Resuscitate Status: Yes    Medical Power of : No      Decision Making:  Family answered  "questions  Goals of Care: 11/26/24: Supportive conversation held with Mrs. Ma's  and daughter at bedside. Mrs. Ma is extremely fatigued and listens during our conversation while her family supplements history and answers questions. They clarify that in the setting of Mrs. Ma's terminal illness, they would want her to be as comfortable as possible and do not wish to prolong her suffering. 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 Unfortunately there would be no one at home who would be able to provide care for Mrs. Ma, especially at this level of debility. Family is appropriately tearful and expresses sincere gratitude for palliative provider FLAVIO Chacon for her compassion and kindness. We discussed the philosophy of care and limitations of the 3rd floor inpatient hospice unit and they were agreeable with the expectations of care in the unit including transfer of Mrs. Ma for aggressive symptom management at the end of life.         Significant Labs: CBC:   No results for input(s): "WBC", "HGB", "HCT", "PLT" in the last 48 hours.    CMP:   No results for input(s): "NA", "K", "CL", "CO2", "GLU", "BUN", "CREATININE", "CALCIUM", "PROT", "ALBUMIN", "BILITOT", "ALKPHOS", "AST", "ALT", "ANIONGAP", "EGFRNONAA" in the last 48 hours.    Invalid input(s): "ESTGFAFRICA"    CBC:   Recent Labs   Lab 11/25/24  0345   WBC 15.04*   HGB 8.5*   HCT 26.6*   MCV 90   *     BMP:  No results for input(s): "GLU", "NA", "K", "CL", "CO2", "BUN", "CREATININE", "CALCIUM", "MG" in the last 24 hours.  LFT:  Lab Results   Component Value Date     (H) 11/25/2024    ALKPHOS 1,798 (H) 11/25/2024    BILITOT 18.1 (H) 11/25/2024     Albumin:   Albumin   Date Value Ref Range Status   11/25/2024 1.5 (L) 3.5 - 5.2 g/dL Final   11/25/2024 1.5 (L) 3.5 - 5.2 g/dL Final     Protein:   Total Protein   Date Value Ref Range Status   11/25/2024 5.6 (L) 6.0 - 8.4 " g/dL Final     Lactic acid:   Lab Results   Component Value Date    LACTATE >12.0 (HH) 11/25/2024    LACTATE >12.0 (HH) 11/25/2024       Significant Imaging: I have reviewed all pertinent imaging results/findings within the past 24 hours.    I spent a total of 50 minutes on the day of the visit. This includes face to face time in discussion of goals of care, symptom assessment, coordination of care and emotional support. This also includes non-face to face time preparing to see the patient (eg, review of tests/imaging), obtaining and/or reviewing separately obtained history, documenting clinical information in the electronic or other health record, independently interpreting results and communicating results to the patient/family/caregiver, or care coordinator.    Nora Garcia MD  Palliative Medicine  [facility] - Hospice and Palliative Medicine

## 2024-11-27 NOTE — SUBJECTIVE & OBJECTIVE
Interval History: Grimacing, struggling to answer orientation questions. Swelling noted in lower extremities.  at bedside.    Medications:  Continuous Infusions:  Scheduled Meds:  PRN Meds:  Current Facility-Administered Medications:     glycopyrrolate, 0.3 mg, Intravenous, Q4H PRN    haloperidol lactate, 1 mg, Intravenous, Q4H PRN    lorazepam, 0.5 mg, Intravenous, Q30 Min PRN    morphine, 4 mg, Intravenous, Q15 Min PRN    ondansetron, 4 mg, Intravenous, Q6H PRN    promethazine, 12.5 mg, Oral, Q6H PRN    Objective:     Vital Signs (Most Recent):  Temp: 97.5 °F (36.4 °C) (11/1957)  Pulse: 104 (11/1957)  Resp: (!) 22 (11/26/24 2301)  BP: (!) 100/56 (11/1957)  SpO2: 97 % (11/1957) Vital Signs (24h Range):  Temp:  [97.5 °F (36.4 °C)] 97.5 °F (36.4 °C)  Pulse:  [104-106] 104  Resp:  [16-26] 22  SpO2:  [97 %-99 %] 97 %  BP: (100)/(56) 100/56     Weight: 63.3 kg (139 lb 8.8 oz)  Body mass index is 26.37 kg/m².       Physical Exam  Constitutional:       Appearance: She is ill-appearing.   HENT:      Head: Normocephalic.      Right Ear: External ear normal.      Left Ear: External ear normal.      Nose: Nose normal.      Mouth/Throat:      Mouth: Mucous membranes are dry.   Eyes:      Extraocular Movements: Extraocular movements intact.      Conjunctiva/sclera: Conjunctivae normal.   Cardiovascular:      Rate and Rhythm: Normal rate.   Pulmonary:      Effort: Pulmonary effort is normal.      Breath sounds: Normal breath sounds.   Abdominal:      General: There is distension.      Tenderness: There is abdominal tenderness.   Musculoskeletal:         General: Swelling present.   Skin:     Findings: Bruising present.   Neurological:      Mental Status: She is disoriented.            Review of Symptoms      Symptom Assessment (ESAS 0-10 Scale)  Pain:  0  Dyspnea:  0  Anxiety:  0  Nausea:  0  Depression:  0  Anorexia:  0  Fatigue:  0  Insomnia:  0  Restlessness:  0  Agitation:  0  Unable to  complete assessment due to Mental status change         Pain Assessment in Advanced Demential Scale (PAINAD)   Breathing - Independent of vocalization:  0  Negative vocalization:  0  Facial expression:  2  Body language:  1  Consolability:  0  Total:  3    Living Arrangements:  Lives with spouse    Psychosocial/Cultural:   See Palliative Psychosocial Note: No   to  for 47 years, had been together for 50 years. One daughter Oriana and one son. Worked as an  for many years  **Primary  to Follow**  Palliative Care  Consult: No    Spiritual:  F - Tyra and Belief:  Baptism  I - Importance:  Important  C - Community:  N/A  A - Address in Care:   support engaged        Advance Care Planning   Advance Directives:   Living Will: No    LaPOST: No    Do Not Resuscitate Status: Yes    Medical Power of : No      Decision Making:  Family answered questions  Goals of Care: 11/26/24: Supportive conversation held with Mrs. Ma's  and daughter at bedside. Mrs. Ma is extremely fatigued and listens during our conversation while her family supplements history and answers questions. They clarify that in the setting of Mrs. Ma's terminal illness, they would want her to be as comfortable as possible and do not wish to prolong her suffering. 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 Unfortunately there would be no one at home who would be able to provide care for Mrs. Ma, especially at this level of debility. Family is appropriately tearful and expresses sincere gratitude for palliative provider FLAVIO Chacon for her compassion and kindness. We discussed the philosophy of care and limitations of the 3rd floor inpatient hospice unit and they were agreeable with the expectations of care in the unit including transfer of Mrs. Ma for aggressive symptom management at the end of life.      "    Significant Labs: CBC:   No results for input(s): "WBC", "HGB", "HCT", "PLT" in the last 48 hours.    CMP:   No results for input(s): "NA", "K", "CL", "CO2", "GLU", "BUN", "CREATININE", "CALCIUM", "PROT", "ALBUMIN", "BILITOT", "ALKPHOS", "AST", "ALT", "ANIONGAP", "EGFRNONAA" in the last 48 hours.    Invalid input(s): "ESTGFAFRICA"    CBC:   Recent Labs   Lab 11/25/24  0345   WBC 15.04*   HGB 8.5*   HCT 26.6*   MCV 90   *     BMP:  No results for input(s): "GLU", "NA", "K", "CL", "CO2", "BUN", "CREATININE", "CALCIUM", "MG" in the last 24 hours.  LFT:  Lab Results   Component Value Date     (H) 11/25/2024    ALKPHOS 1,798 (H) 11/25/2024    BILITOT 18.1 (H) 11/25/2024     Albumin:   Albumin   Date Value Ref Range Status   11/25/2024 1.5 (L) 3.5 - 5.2 g/dL Final   11/25/2024 1.5 (L) 3.5 - 5.2 g/dL Final     Protein:   Total Protein   Date Value Ref Range Status   11/25/2024 5.6 (L) 6.0 - 8.4 g/dL Final     Lactic acid:   Lab Results   Component Value Date    LACTATE >12.0 (HH) 11/25/2024    LACTATE >12.0 (HH) 11/25/2024       Significant Imaging: I have reviewed all pertinent imaging results/findings within the past 24 hours.  "

## 2024-11-27 NOTE — ASSESSMENT & PLAN NOTE
Tosin Ma is a 69-year-old woman with a history of stage IV colon adenocarcinoma with metastasis to the liver (followed by Dr. Manriquez), breast cancer (1996), DM2, who was admitted for intractable nausea/vomiting, course complicated by oliguric acute renal failure and stepped up to the MICU for CRRT. Unfortunately despite aggressive medical management, Mrs. Ma has approached end of life and family agreed to focus on comfort measures, including discontinuation of life-sustaining measures to avoid prolongation of suffering. Palliative and Supportive Care was consulted to explore goals of care and transfer to the inpatient hospice/palliative care unit.    Interval update 11/27/2024 - had bowel movement yesterday, demonstrating signs of pain with groaning and grimacing.  did not sleep well last night because wife was groaning from pain and he was worried. Received two doses of morphine 4 mg IV at 18:40 and 23:01    Terminal diagnosis: Stage IV colon adenocarcinoma   - Acute renal failure with oliguria    Need for inpatient care: Active dying process as evidenced by loss of consciousness, cool extremities, and irregular breathing pattern. Requiring IV opioids and benzodiazepines for pain behaviors, respiratory distress, and agitation. Likely prognosis of hours to short days    Dispo: anticipate end-of-life care on the unit, if patient unexpectedly stabilizes, will approach family regarding care at home or alternative in-facility/[facility] placement with hospice support    Symptom Assessment  - Pain/ pain behaviors: uncontrolled  - Dyspnea/tachypnea: controlled  - Agitation: controlled  - Mentation: minimally responsive    Symptom Oriented Management:    Tachypnea/Dyspnea:   - Morphine 4 mg IV q15min PRN pain behavior (groaning, grimacing, guarding), labored breathing  - Lorazepam 0.5 mg IV q30min PRN anxiety, labored breathing refractory to opioids  - Fan at bedside  - Avoid artificial hydration  - Treat  fevers symptomatically with PRN APAP, NSAID's, and if refractory to these measures, dexamethasone     Pain Behaviors:   - Opioids as above  - Turn only as tolerated     Agitation/Anxiety/Encephalopathy:   - Treat for pain/labored breathing as above  - Lorazepam 0.5 mg IV q30min PRN anxiety/agitation, labored breathing refractory to opioids  - Haloperidol 1 mg IV q4h PRN agitation, nausea/vomiting refractory to ondansetron    Profuse Oropharyngeal Secretions:  - Turn head side to side to help shift secretions out of airway, allow to pool to cheeks and out of mouth  - Gentle, frequent oral care - encourage family at bedside to participate  - Yankauer suction at bedside  - Glycopyrrolate 0.3 mg IV q4h PRN profuse oropharyngeal secretions    Nausea/Vomiting/Retching:  - Known colon cancer with prior bowel obstructions  - Ondansetron 4 mg IV q6h PRN nausea/vomiting  - Promethazine 12.5 mg PO q6h PRN nausea/vomiting, 2nd line  - Haloperidol 1 mg IV q6h PRN nausea/vomiting, 3rd line  - Lorazepam 1 mg IV q30min PRN nausea/vomiting, 4th line    Nutrition / Hydration  - No IV fluids and artificial nutrition  - Liberalize diet in order to permit comfort feedings as desired and tolerated  - Simplified medication regimen by eliminating those medications that provide little to no benefit at end-of-life    Bowel Regimen:  - Bisacodyl 10 mg suppository daily PRN no bowel movement in 5 days  - Will not prioritize at end of life    Fever  - Apply cold compresses  - Fan at bedside  - Acetaminophen 650 mg suppository q4h PRN fever

## 2024-11-27 NOTE — CHAPLAIN
Palliative Care        Patient: Tosin Ma  MRN: [id number]  : 1955  Age: 69 y.o.  Hospital Length of Stay: 4  Code Status: Code Status Discussion Note  Attending Provider: Nora Garcia MD  Principal Problem: ANDRES (acute kidney injury)       Visited pall med/actively dying pt; pt did not appear to be in distress and seemed comfortable and soundly sleeping. I remember this pt from previous admits this year.    Niece bedside and the rest of the family in the big family room.    Adjusted the covers on pt and reminded her she is loved and being well taken care of; provided compassionate presence and tender touch.      Asked niece if she had any questions and if I could fetch her anything. She politely declined. Let her know I will not be here tomorrow, but that other chaplains are available if needed.     Palliative  will continue to follow.               To [facility] [place]:   Educated the patient/family regarding the services offered by the Spiritual Care team in my absence as the specialized Palliative Care  ([id number])  My hours are 7:30a-4:00p M-, but Spiritual Care Chaplains are available /.  I'm usually the first point of contact for palliative care patients, but any  is able to help.  Evening, overnight and weekends, please dial [phone] which is carried by an in-house Spiritual Care  at all hours.         Rev Consuelo. Akiko Epps MDiv, [facility]  Board Certified Palliative Care   Palliative Medicine Department, 9th Floor [facility] [place]     My SpectraLink: [email or web address]   Office: [phone]  ** If you call and I don't answer, please leave a voicemail because vmail is forwarded to me  Email: [email or web address]  Work hours: [phone]     The care I provide is shaped by the Code of Ethics of the [employer] Professional Chaplains

## 2024-11-28 NOTE — NURSING
21:36 Patient was unresponsive to verbal or tactile stimulation. Pupils were fixed. There where no heart sounds and pulse were absent. No spontaneous chest rise and fall.      and extended family on bedside.    21:38 [facility] Chaplain,Oliver was notified and will come in shortly.    21:39 House Supervisor was notified about the passing.    Charge nurse message Dr. Kong via secure chat to pronounce the passing.

## 2024-11-28 NOTE — SIGNIFICANT EVENT
Notified by nursing staff that patient is unresponsive.     Physical Exam  Constitutional:       Comments: Unresponsive   Eyes:      Pupils:      Right eye: Pupil is not reactive.      Left eye: Pupil is not reactive.      Comments: Fixed, dilated    Cardiovascular:      Pulses:           Carotid pulses are 0 on the right side and 0 on the left side.     Comments: No spontaneous heart sounds auscultated, no palpable carotid pulse  Pulmonary:      Comments: No breath sounds auscultated, no spontaneous respirations or chest rise            Time of Death: 21:54     NOK Notification: multiple family members at bedside, including spouse, 3 adult children.   They decline autopsy at this time               Logan Kong M.D.  Attending Physician  [facility] Medicine Dept.  Pager: [phone]  Spectralink -[phone]

## 2024-11-28 NOTE — PLAN OF CARE
19:13 Bedside Handoff report received from the day shift nurse. Patient is resting both eyes are closed. Respiration is even and unlabored at this time. No sign of distress or discomfort noted. On RA. Underpad placed. Head of the bed elevated and pillow in placed for comfort and support. Patient family is on bedside and encouraged to ask anything if in need. Ongoing plan of care and monitoring for any changes.    20:20 Patient started to have difficulty in breathing with 28 bpm. Patient showed pain behavior; groaning as well. PRN Morphine given as ordered. Family at bedside and asked about the patient situation. Consoled and explained compassionately about end of life and family understood. Will continue to monitor for any changes. Ongoing plan of care.    21:00 Patient started to have labored breathing. PRN Ativan given as ordered. Respiration at 5bpm. Family on bedside, accepted the situation and they just wanted the patient to be comfortable as possible.    Problem: Adult Inpatient Plan of Care  Goal: Plan of Care Review  Outcome: Progressing  Goal: Patient-Specific Goal (Individualized)  Outcome: Progressing  Goal: Absence of Hospital-Acquired Illness or Injury  Outcome: Progressing  Goal: Optimal Comfort and Wellbeing  Outcome: Progressing  Goal: Readiness for Transition of Care  Outcome: Progressing     Problem: Skin Injury Risk Increased  Goal: Skin Health and Integrity  Outcome: Progressing     Problem: Infection  Goal: Absence of Infection Signs and Symptoms  Outcome: Progressing     Problem: Palliative Care  Goal: Enhanced Quality of Life  Outcome: Progressing     Problem: Pain Acute  Goal: Optimal Pain Control and Function  Outcome: Progressing     Problem: Fall Injury Risk  Goal: Absence of Fall and Fall-Related Injury  Outcome: Progressing

## 2024-11-28 NOTE — PLAN OF CARE
Problem: Palliative Care  Goal: Enhanced Quality of Life  Outcome: Progressing     Problem: Pain Acute  Goal: Optimal Pain Control and Function  Outcome: Progressing     Problem: Fall Injury Risk  Goal: Absence of Fall and Fall-Related Injury  Outcome: Progressing

## 2024-11-29 PROBLEM — Z51.5 ENCOUNTER FOR PALLIATIVE CARE: Status: ACTIVE | Noted: 2024-11-29

## 2024-11-29 LAB
BACTERIA BLD CULT: NORMAL
BACTERIA BLD CULT: NORMAL

## 2024-11-29 NOTE — ASSESSMENT & PLAN NOTE
Tosin Ma was a 69-year-old woman with a history of stage IV colon adenocarcinoma with metastasis to the liver (followed by Dr. Manriquez), breast cancer (1996), DM2, who was admitted for intractable nausea/vomiting, course complicated by oliguric acute renal failure and stepped up to the MICU for CRRT. Unfortunately despite aggressive medical management, Mrs. Ma approached end of life and family agreed to focus on comfort measures, including discontinuation of life-sustaining measures to avoid prolongation of suffering. Palliative and Supportive Care was consulted to explore goals of care and transfer to the inpatient hospice/palliative care unit. Following admission to the inpatient hospice/palliative care unit, patient's symptoms were managed with proportionate opioids and benzodiazepines to ensure comfort during end of life care. Family and patient were supported by our interdisciplinary team of doctors, nurses, social workers, and other team members. Patient peacefully passed away on November 27, 2024 at 21:54.    Terminal diagnosis: Stage IV colon adenocarcinoma         - Acute renal failure with oliguria

## 2024-11-29 NOTE — PLAN OF CARE
[facility] - Hospice and Palliative Medicine  Discharge Final Note    Primary Care Provider: Lake Cunningham MD    Expected Discharge Date: 2024    Final Discharge Note (most recent)       Final Note - 24          Final Note    Assessment Type Final Discharge Note     Anticipated Discharge Disposition                  Cyn Carreon LMSW  [facility]  [id number]

## 2024-11-29 NOTE — DISCHARGE SUMMARY
[facility] - Hospice and Palliative Medicine  Palliative Medicine  Discharge Summary      Patient Name: Tosin Ma  MRN: [id number]  Admission Date: 11/23/2024  Hospital Length of Stay: 4 days  Discharge Date and Time: 11/27/2024 9:54 PM  Attending Physician: Sherry att. providers found   Discharging Provider: Nora Garcia MD  Primary Care Provider: Lake Cunningham MD    HPI:   Tosin Ma is a 69-year-old woman with a history of stage IV colon adenocarcinoma with metastasis to the liver (followed by Dr. Manriquez), breast cancer, DM2, who was admitted for intractable nausea/vomiting, course complicated by oliguric acute renal failure and stepped up to the MICU for CRRT. Unfortunately despite aggressive medical management, Mrs. Ma has approached end of life and family agreed to focus on comfort measures, including discontinuation of life-sustaining measures to avoid prolongation of suffering. Palliative and Supportive Care was consulted to explore goals of care and transfer to the inpatient hospice/palliative care unit.    Following admission to the inpatient hospice/palliative care unit, patient's symptoms were managed with proportionate opioids and benzodiazepines to ensure comfort during end of life care. Family and patient were supported by our interdisciplinary team of doctors, nurses, social workers, and other team members. Patient peacefully passed away on November 27, 2024 at 21:54.     Goals of Care Treatment Preferences:  Code Status: DNR          What is most important right now is to focus on symptom/pain control, quality of life, even if it means sacrificing a little time, comfort and QOL .  Accordingly, we have decided that the best plan to meet the patient's goals includes pivot to comfort-focused care.      Consults:   Consults (From admission, onward)          Status Ordering Provider     Inpatient consult to Palliative Care  Once        Provider:  (Not yet assigned)    Completed ARNEL RODRIGUEZ      Inpatient consult to Palliative Care  Once        Provider:  (Not yet assigned)    Completed ARNEL RODRIGUEZ     Inpatient consult to Critical Care Medicine  Once        Provider:  (Not yet assigned)    Completed JARRETT RON     Inpatient consult to Nephrology  Once        Provider:  (Not yet assigned)    Completed SHUKRI GARCIA     Inpatient consult to Hematology/Oncology  Once        Provider:  (Not yet assigned)    Completed ARABELLA ALCAZAR            Palliative Care  Encounter for palliative care  Tosin Ma was a 69-year-old woman with a history of stage IV colon adenocarcinoma with metastasis to the liver (followed by Dr. Manriquez), breast cancer (1996), DM2, who was admitted for intractable nausea/vomiting, course complicated by oliguric acute renal failure and stepped up to the MICU for CRRT. Unfortunately despite aggressive medical management, Mrs. Ma approached end of life and family agreed to focus on comfort measures, including discontinuation of life-sustaining measures to avoid prolongation of suffering. Palliative and Supportive Care was consulted to explore goals of care and transfer to the inpatient hospice/palliative care unit. Following admission to the inpatient hospice/palliative care unit, patient's symptoms were managed with proportionate opioids and benzodiazepines to ensure comfort during end of life care. Family and patient were supported by our interdisciplinary team of doctors, nurses, social workers, and other team members. Patient peacefully passed away on November 27, 2024 at 21:54.    Terminal diagnosis: Stage IV colon adenocarcinoma         - Acute renal failure with oliguria      Final Active Diagnoses:    Diagnosis Date Noted POA    Encounter for palliative care [Z51.5] 11/29/2024 Not Applicable    Colon adenocarcinoma [C18.9] 11/14/2024 Yes      Problems Resolved During this Admission:    Diagnosis Date Noted Date Resolved POA    PRINCIPAL PROBLEM:  ANDRES (acute  kidney injury) [N17.9] 2024 Yes    Comfort measures only status [Z51.5] 2024 Not Applicable    Diarrhea [R19.7] 2024 Yes    Goals of care, counseling/discussion [Z71.89] 2024 Not Applicable    Encephalopathy, metabolic [G93.41] 2024 Yes    Sepsis with acute organ dysfunction [A41.9, R65.20] 2024 Yes    Palliative care encounter [Z51.5] 2024 Not Applicable    Metabolic acidosis [E87.20] 2024 Yes    Nausea & vomiting [R11.2] 2024 Yes    Metastasis to liver [C78.7] 2024 Yes    Serum total bilirubin elevated [R17] 2024 Yes    Type 2 diabetes mellitus with hyperglycemia, without long-term current use of insulin [E11.65] 2024 Yes    Essential hypertension [I10] 2020 Yes    History of breast cancer [Z85.3] 2014 Not Applicable       Discharged Condition:     Disposition:     Follow Up: None    Patient Instructions:   No discharge procedures on file.    Significant Diagnostic Studies: N/A    Pending Diagnostic Studies:       None           Medications:  None    Indwelling Lines/Drains at time of discharge:   Lines/Drains/Airways       None                   Time spent on the discharge of patient: 30 minutes  Patient was seen and examined on the date of discharge and determined to be suitable for discharge.    Nora Garcia MD  Department of Palliative Medicine  [facility] - Hospice and Palliative Medicine

## (undated) DEVICE — Device

## (undated) DEVICE — KIT BIOPSY MISSION 18GX16CM

## (undated) DEVICE — GELATIN SURGIPOWDER ABSORBABLE

## (undated) DEVICE — KIT PROBE COVER WITH GEL

## (undated) DEVICE — TOWEL OR DISP STRL BLUE 4/PK

## (undated) DEVICE — STOPCOCK MED PRSS 3-WAY